# Patient Record
Sex: MALE | Race: WHITE | NOT HISPANIC OR LATINO | Employment: UNEMPLOYED | URBAN - METROPOLITAN AREA
[De-identification: names, ages, dates, MRNs, and addresses within clinical notes are randomized per-mention and may not be internally consistent; named-entity substitution may affect disease eponyms.]

---

## 2018-02-16 VITALS
HEART RATE: 55 BPM | TEMPERATURE: 98.8 F | OXYGEN SATURATION: 100 % | DIASTOLIC BLOOD PRESSURE: 65 MMHG | RESPIRATION RATE: 20 BRPM | WEIGHT: 130 LBS | SYSTOLIC BLOOD PRESSURE: 114 MMHG

## 2018-02-17 ENCOUNTER — HOSPITAL ENCOUNTER (EMERGENCY)
Facility: HOSPITAL | Age: 28
Discharge: HOME | End: 2018-02-17
Attending: EMERGENCY MEDICINE | Admitting: EMERGENCY MEDICINE
Payer: COMMERCIAL

## 2018-02-17 ENCOUNTER — APPOINTMENT (EMERGENCY)
Dept: RADIOLOGY | Facility: HOSPITAL | Age: 28
End: 2018-02-17
Payer: COMMERCIAL

## 2018-02-17 ENCOUNTER — APPOINTMENT (EMERGENCY)
Dept: CT IMAGING | Facility: HOSPITAL | Age: 28
End: 2018-02-17
Payer: COMMERCIAL

## 2018-02-17 DIAGNOSIS — S09.90XA MILD CLOSED HEAD INJURY, INITIAL ENCOUNTER: ICD-10-CM

## 2018-02-17 DIAGNOSIS — F32.A DEPRESSION: Primary | ICD-10-CM

## 2018-02-17 DIAGNOSIS — S02.2XXA CLOSED FRACTURE OF NASAL BONE, INITIAL ENCOUNTER: ICD-10-CM

## 2018-02-17 PROCEDURE — 70450 CT HEAD/BRAIN W/O DYE: CPT

## 2018-02-17 PROCEDURE — 73610 X-RAY EXAM OF ANKLE: CPT

## 2018-02-17 PROCEDURE — 99284 EMERGENCY DEPT VISIT MOD MDM: CPT

## 2018-02-17 PROCEDURE — 72125 CT NECK SPINE W/O DYE: CPT

## 2018-02-17 NOTE — DISCHARGE INSTRUCTIONS
Depression   WHAT YOU NEED TO KNOW:   Depression is a medical condition that causes feelings of sadness or hopelessness that do not go away  Depression may cause you to lose interest in things you used to enjoy  These feelings may interfere with your daily life  DISCHARGE INSTRUCTIONS:   Call 911 for any of the following:   · You think about harming yourself or someone else  Contact your healthcare provider if:   · Your symptoms do not improve  · You cannot make it to your next appointment  · You have new symptoms  · You have questions or concerns about your condition or care  Medicines:   · Antidepressants  may be given to improve or balance your mood  You may need to take this medicine for several weeks before you begin to feel better  · Take your medicine as directed  Contact your healthcare provider if you think your medicine is not helping or if you have side effects  Tell him of her if you are allergic to any medicine  Keep a list of the medicines, vitamins, and herbs you take  Include the amounts, and when and why you take them  Bring the list or the pill bottles to follow-up visits  Carry your medicine list with you in case of an emergency  Therapy  may be used to treat your depression  A therapist will help you learn to cope with your thoughts and feelings  This can be done alone or in a group  It may also be done with family members or a significant other  Self-care:   · Get regular physical activity  Try to exercise for 30 minutes, 3 to 5 days a week  Work with your healthcare provider to develop an exercise plan that you enjoy  Physical activity may improve your symptoms  · Get enough sleep  Create a routine to help you relax before bed  You can listen to music, read, or do yoga  Try to go to bed and wake up at the same time every day  Sleep is important for emotional health  · Eat a variety of healthy foods from all of the food groups    A healthy meal plan is low in fat, salt, and added sugar  Ask your healthcare provider for more information about a meal plan that is right for you  · Do not drink alcohol or use drugs  Alcohol and drugs can make your symptoms worse  Follow up with your healthcare provider as directed: Your healthcare provider will monitor your progress at follow-up visits  He or she will also monitor your medicine if you take antidepressants  Your healthcare provider will ask if the medicine is helping  Tell him or her about any side effects or problems you may have with your medicine  The type or amount of medicine may need to be changed  Write down your questions so you remember to ask them during your visits  © 2017 [employer]  Information is for End User's use only and may not be sold, redistributed or otherwise used for commercial purposes  All illustrations and images included in CareNotes® are the copyrighted property of [employer]  or [employer]  The above information is an  only  It is not intended as medical advice for individual conditions or treatments  Talk to your doctor, nurse or pharmacist before following any medical regimen to see if it is safe and effective for you  Facial Fracture   WHAT YOU NEED TO KNOW:   A facial fracture is a break in one or more of the bones in your face  The bones in your face include those around your eye, your cheekbones, and the bones of your nose and jaw  A facial fracture may also cause damage to nearby tissue  DISCHARGE INSTRUCTIONS:   Medicines:   · Decongestant medicine:  Decongestants help decrease swelling in your nose and sinuses  This medicine may also help you breathe easier  · Pain medicine: You may be given a prescription medicine to decrease pain  Do not wait until the pain is severe before you take this medicine  · Steroid medicine: This medicine helps decrease swelling in your face      · Antibiotic medicine:  Antibiotic medicine helps treat an infection caused by bacteria  This medicine may be given if you have an open wound  · Take your medicine as directed  Contact your healthcare provider if you think your medicine is not helping or if you have side effects  Tell him of her if you are allergic to any medicine  Keep a list of the medicines, vitamins, and herbs you take  Include the amounts, and when and why you take them  Bring the list or the pill bottles to follow-up visits  Carry your medicine list with you in case of an emergency  Follow up with your healthcare provider as directed:  Write down your questions so you remember to ask them during your visits  Nutrition:  You may not be able to eat solid food for a period of time  You may first be started on a liquid diet  Examples of liquids you may be able to have include, water, broth, gelatin, apple juice, or lemon-lime soda pop  After a few days, you may be allowed to eat soft foods, such as applesauce, bananas, cooked cereal, cottage cheese, pudding, and yogurt  Ask for more information about the type of foods you can eat  Rehabilitation:  If you had surgery to fix your facial fracture, you may need oral and facial rehabilitation  This is done to restore normal use and movement of your facial muscles  Ask for more information about rehabilitation  Help prevent a facial fracture:   · Wear a helmet when you ride a bicycle or a motorcycle  · Wear a seatbelt at all times when you are inside a motor vehicle  · Wear protective headgear and eyewear during sporting activities  Self-care:   · Apply ice:  Ice helps decrease swelling and pain  Ice may also help prevent tissue damage  Use an ice pack or put crushed ice in a plastic bag  Cover it with a towel and place it on your face for 15 to 20 minutes every hour as directed  · Keep your head elevated:  Keep you head above the level of your heart as often as you can  This will help decrease swelling and pain   Prop your head on pillows or blankets to keep it elevated comfortably  · Avoid putting pressure on your face:      ¨ Do not sleep on the injured side of your face  Pressure on the area of your injury may cause further damage  ¨ Sneeze with your mouth open to decrease pressure on your broken facial bones  Too much pressure from a sneeze may cause your broken bones to move and cause more damage  ¨ Try not to blow your nose because it may cause more damage if you have a fracture near your eye  The pressure from blowing your nose may pinch the nerve of your eye and cause permanent damage  · Clean your mouth carefully: It may be hard to clean your teeth if have an injury or fracture near your mouth  Your will be shown the best way to do this so you do not hurt yourself  A water pick or a child-sized soft toothbrush may work well to clean your mouth  Contact your healthcare provider if:   · You are bleeding from a wound on your face  · You have double vision or you suddenly have problems with your eyesight  · You have questions or concerns about your condition or care  Return to the emergency department if:   · You have clear or pinkish fluid draining from your nose or mouth  · You have numbness in your face  · You have worsening pain in your eye or face  · You suddenly have trouble chewing or swallowing  · You suddenly feel lightheaded and short of breath  · You have chest pain when you take a deep breath or cough  You may cough up blood  · Your arm or leg feels warm, tender, and painful  It may look swollen and red  © 2017 [employer] Information is for End User's use only and may not be sold, redistributed or otherwise used for commercial purposes  All illustrations and images included in CareNotes® are the copyrighted property of [employer] , [employer]  or [employer]  The above information is an  only   It is not intended as medical advice for individual conditions or treatments  Talk to your doctor, nurse or pharmacist before following any medical regimen to see if it is safe and effective for you  Head Injury   WHAT YOU NEED TO KNOW:   A head injury is most often caused by a blow to the head  This may occur from a fall, bicycle injury, sports injury, being struck in the head, or a motor vehicle accident  DISCHARGE INSTRUCTIONS:   Call 911 or have someone else call for any of the following:   · You cannot be woken  · You have a seizure  · You stop responding to others or you faint  · You have blurry or double vision  · Your speech becomes slurred or confused  · You have arm or leg weakness, loss of feeling, or new problems with coordination  · Your pupils are larger than usual or one pupil is a different size than the other  · You have blood or clear fluid coming out of your ears or nose  Return to the emergency department if:   · You have repeated or forceful vomiting  · You feel confused  · Your headache gets worse or becomes severe  · You or someone caring for you notices that you are harder to wake than usual   Contact your healthcare provider if:   · Your symptoms last longer than 6 weeks after the injury  · You have questions or concerns about your condition or care  Medicines:   · Acetaminophen  decreases pain  Acetaminophen is available without a doctor's order  Ask how much to take and how often to take it  Follow directions  Acetaminophen can cause liver damage if not taken correctly  · Take your medicine as directed  Contact your healthcare provider if you think your medicine is not helping or if you have side effects  Tell him or her if you are allergic to any medicine  Keep a list of the medicines, vitamins, and herbs you take  Include the amounts, and when and why you take them  Bring the list or the pill bottles to follow-up visits   Carry your medicine list with you in case of an emergency  Self-care:   · Rest  or do quiet activities for 24 to 48 hours  Limit your time watching TV, using the computer, or doing tasks that require a lot of thinking  Slowly return to your normal activities as directed  Do not play sports or do activities that may cause you to get hit in the head  Ask your healthcare provider when you can return to sports  · Apply ice  on your head for 15 to 20 minutes every hour or as directed  Use an ice pack, or put crushed ice in a plastic bag  Cover it with a towel before you apply it to your skin  Ice helps prevent tissue damage and decreases swelling and pain  · Have someone stay with you for 24 hours  or as directed  This person can monitor you for complications and call [phone]  When you are awake the person should ask you a few questions to see if you are thinking clearly  An example would be to ask your name or your address  Prevent another head injury:   · Wear a helmet that fits properly  Do this when you play sports, or ride a bike, scooter, or skateboard  Helmets help decrease your risk of a serious head injury  Talk to your healthcare provider about other ways you can protect yourself if you play sports  · Wear your seat belt every time you are in a car  This helps to decrease your risk for a head injury if you are in a car accident  Follow up with your healthcare provider as directed:  Write down your questions so you remember to ask them during your visits  © 2017 [employer] Information is for End User's use only and may not be sold, redistributed or otherwise used for commercial purposes  All illustrations and images included in CareNotes® are the copyrighted property of [employer]  or [employer]  The above information is an  only  It is not intended as medical advice for individual conditions or treatments   Talk to your doctor, nurse or pharmacist before following any medical regimen to see if it is safe and effective for you
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
## 2018-02-17 NOTE — ED PROVIDER NOTES
History  Chief Complaint   Patient presents with    Psychiatric Evaluation     patient denies SI/HI/AH/VH  patient just wants to get back on his xanax 2mg  "I just want to be normal "      58-year-old male with a history of schizoaffective disorder and anxiety presents to the emergency department for evaluation of crying and also head injury after falling off a ladder earlier today  Patient is accompanied by his mother  The patient states that his sister picks on me and his mother is concerned about all the crying that he does and he is also hearing voices  The patient is very upset because his sister told him that he is going to be involuntarily committed  Patient denies suicidal or homicidal ideations  He does seem intoxicated and slurring his words and states that he just took his last Xanax this evening  He fell about 5 feet from a ladder earlier today  He did strike his head but there was no loss of consciousness, however he has been complaining of a headache all day  No vomiting  He also complains of right ankle pain        History provided by:  Patient and parent   used: No    Psychiatric Evaluation   Presenting symptoms: depression and hallucinations (Hearing voices)    Presenting symptoms: no aggressive behavior, no homicidal ideas, no paranoid behavior, no self-mutilation, no suicidal thoughts, no suicidal threats and no suicide attempt    Presenting symptoms comment:  Crying  Patient accompanied by:  Parent  Context: medication    Context: not alcohol use, not drug abuse, not noncompliant and not recent medication change    Treatment compliance: All of the time ([facility])  Relieved by:   Anti-anxiety medications  Worsened by:  Family interactions  Associated symptoms: anxiety    Associated symptoms: no headaches    Risk factors: hx of mental illness    Risk factors: no hx of suicide attempts and no recent psychiatric admission        None       Past Medical History:   Diagnosis Date    Psychiatric disorder     Schizo-affective schizophrenia [facility])        History reviewed  No pertinent surgical history  History reviewed  No pertinent family history  I have reviewed and agree with the history as documented  Social History   Substance Use Topics    Smoking status: Current Some Day Smoker    Smokeless tobacco: Never Used    Alcohol use No        Review of Systems   Constitutional: Negative  HENT: Negative  Eyes: Negative  Respiratory: Negative  Cardiovascular: Negative  Gastrointestinal: Negative  Genitourinary: Negative  Musculoskeletal: Negative for neck pain  Skin: Negative  Allergic/Immunologic: Negative  Neurological: Negative  Negative for weakness, numbness and headaches  Hematological: Negative  Psychiatric/Behavioral: Positive for hallucinations (Hearing voices)  Negative for homicidal ideas, paranoia, self-injury and suicidal ideas  The patient is nervous/anxious  All other systems reviewed and are negative  Physical Exam  ED Triage Vitals [02/16/18 2314]   Temperature Pulse Respirations Blood Pressure SpO2   98 8 °F (37 1 °C) 55 20 114/65 100 %      Temp src Heart Rate Source Patient Position - Orthostatic VS BP Location FiO2 (%)   -- Monitor -- Right arm --      Pain Score       6           Orthostatic Vital Signs  Vitals:    02/16/18 2314   BP: 114/65   Pulse: 55       Physical Exam   Constitutional: He is oriented to person, place, and time  He appears well-developed and well-nourished  Non-toxic appearance  He does not have a sickly appearance  He does not appear ill  No distress (Tearful)  Patient appears somewhat intoxicated with slurred speech   HENT:   Head: Normocephalic  Head is with abrasion  Head is without raccoon's eyes, without Dumont's sign, without contusion and without laceration         Right Ear: Tympanic membrane and external ear normal    Left Ear: Tympanic membrane and external ear normal    Mouth/Throat: Oropharynx is clear and moist    Eyes: Conjunctivae and EOM are normal  Pupils are equal, round, and reactive to light  No scleral icterus  Neck: Normal range of motion  Neck supple  Spinous process tenderness present  Cardiovascular: Normal rate, regular rhythm and normal heart sounds  Pulmonary/Chest: Effort normal and breath sounds normal    Abdominal: Soft  Bowel sounds are normal  He exhibits no distension and no mass  There is no tenderness  No hernia  Musculoskeletal: Normal range of motion  He exhibits no edema or deformity  Right ankle: Tenderness  Lateral malleolus tenderness found  Feet:    Lymphadenopathy:     He has no cervical adenopathy  Neurological: He is alert and oriented to person, place, and time  He has normal strength and normal reflexes  He displays normal reflexes  No cranial nerve deficit  He exhibits normal muscle tone  Coordination normal    Skin: Skin is warm and dry  No rash noted  He is not diaphoretic  No erythema  No pallor  Psychiatric: Thought content normal  His speech is slurred  He is slowed  He is not actively hallucinating  He exhibits a depressed mood  He is inattentive  Nursing note and vitals reviewed  ED Medications  Medications - No data to display    Diagnostic Studies  Results Reviewed     None                 XR ankle 3+ views RIGHT   ED Interpretation by Kierra Lr DO (02/17 0132)   No fracture      Final Result by Mega Sanchez MD (02/17 0648)      No acute osseous abnormality  Workstation performed: [id number]         CT cervical spine without contrast   ED Interpretation by Roma Prater DO (02/17 0218)   FINDINGS:   Brain: No significant acute abnormality identified  No acute hemorrhage seen within the brain  No   acute extra-axial fluid collections visualized  No evidence of significant mass effect within the brain  Ventricles: No evidence of significant hydrocephalus     Bones/joints: Right nasal bone fracture, which is suspected to be acute  There is mild overlying soft   tissue swelling  Recommend clinical correlation  No additional acute fractures seen  Soft tissues: Soft tissue swelling in the right posterior scalp  Sinuses: Visualized paranasal sinuses appear clear  Mastoid air cells: Mastoid air cells appear clear  IMPRESSION:   1  No evidence of acute intracranial injury  2  Right nasal bone fracture  3  See above for remaining findings  _______________________________________________   Lincoln Ehsan:   CT Cervical Spine Without Intravenous Contrast   EXAM DATE/TIME:   Cyril Lopez Accession: [id number] MRN: [id number]  Preliminary Radiology Report    (QA) DISCREPANCY? If there is a discrepancy between the preliminary and final interpretation, please notify [facility] via [email or web address]  "[email or web address]  com  If you do not have access to our QA portal, call our QA team at [phone]   This report is intended only for the use of the referring physician, and only in accordance with law, If you received this in error, call [phone]   Page 2 of 2   2/17/2018 1:10 AM   CLINICAL HISTORY:   33years old, male; Injury or trauma; Fall; Initial encounter; Concussion / head injury; Consciousness   not specified; Concussion /head injury; Injury date: 2/16/18; Injury details: Fall off ladder, struck   posterior head   TECHNIQUE:   Axial computed tomography images of the cervical spine without intravenous contrast    Coronal reformatted images were created and reviewed  COMPARISON:   No relevant prior studies available  FINDINGS:   Vertebrae: No acute cervical spine fractures visualized  No evidence of significant vertebral   subluxation  No evidence of acute facet dislocation  Discs/spinal canal/neural foramina: Mild degenerative disc disease at C5-6  Soft tissues: No acute abnormality of the visualized soft tissues is seen     Lung apices: No pneumothorax seen    IMPRESSION:   1  No acute cervical spine fractures identified  2  See above for remaining findings  Final Result by Xavier Spence DO (02/17 0715)   Degenerative and congenital changes  No cervical spine fracture or traumatic malalignment  Findings are consistent with the preliminary report from [facility] which was provided shortly after completion of the exam                 Workstation performed: [id number]         CT head without contrast   ED Interpretation by Case Valentine DO (02/17 0218)   FINDINGS:   Brain: No significant acute abnormality identified  No acute hemorrhage seen within the brain  No   acute extra-axial fluid collections visualized  No evidence of significant mass effect within the brain  Ventricles: No evidence of significant hydrocephalus  Bones/joints: Right nasal bone fracture, which is suspected to be acute  There is mild overlying soft   tissue swelling  Recommend clinical correlation  No additional acute fractures seen  Soft tissues: Soft tissue swelling in the right posterior scalp  Sinuses: Visualized paranasal sinuses appear clear  Mastoid air cells: Mastoid air cells appear clear  IMPRESSION:   1  No evidence of acute intracranial injury  2  Right nasal bone fracture  3  See above for remaining findings  _______________________________________________   Marisela Hollins:   CT Cervical Spine Without Intravenous Contrast   EXAM DATE/TIME:   Yannick Lagos Accession: [id number] MRN: [id number]  Preliminary Radiology Report    (QA) DISCREPANCY? If there is a discrepancy between the preliminary and final interpretation, please notify [facility] via [email or web address]  [email or web address]  com     If you do not have access to our QA portal, call our QA team at [phone]   This report is intended only for the use of the referring physician, and only in accordance with law, If you received this in error, call [phone]   Page 2 of 2   2/17/2018 1:10 AM   CLINICAL HISTORY:   33years old, male; Injury or trauma; Fall; Initial encounter; Concussion / head injury; Consciousness   not specified; Concussion /head injury; Injury date: 2/16/18; Injury details: Fall off ladder, struck   posterior head   TECHNIQUE:   Axial computed tomography images of the cervical spine without intravenous contrast    Coronal reformatted images were created and reviewed  COMPARISON:   No relevant prior studies available  FINDINGS:   Vertebrae: No acute cervical spine fractures visualized  No evidence of significant vertebral   subluxation  No evidence of acute facet dislocation  Discs/spinal canal/neural foramina: Mild degenerative disc disease at C5-6  Soft tissues: No acute abnormality of the visualized soft tissues is seen  Lung apices: No pneumothorax seen  IMPRESSION:   1  No acute cervical spine fractures identified  2  See above for remaining findings  Final Result by Wm Jones DO (02/17 0710)   No intracranial hemorrhage or calvarial fracture  Findings are consistent with the preliminary report from [facility] which was provided shortly after completion of the exam             Workstation performed: [id number]                    Procedures  Procedures       Phone Contacts  ED Phone Contact    ED Course  ED Course                                MDM  Number of Diagnoses or Management Options  Diagnosis management comments: 64-year-old male presents for psychiatric evaluation and also to be evaluated after he fell off a ladder earlier today  He is not suicidal or homicidal but is crying because his sister picks on him and told him that he is going to be involuntarily committed  He does appear somewhat intoxicated but admits to taking Xanax before coming here  He does have an abrasion to the top of his head and tenderness along his C-spine and right ankle    At this time I do not feel patient needs further psychiatric evaluation, but will evaluate his injuries given his inability to provide accurate history regarding his fall       Amount and/or Complexity of Data Reviewed  Tests in the radiology section of CPT®: ordered and reviewed      CritCare Time    Disposition  Final diagnoses:   Depression   Mild closed head injury, initial encounter   Closed fracture of nasal bone, initial encounter     Time reflects when diagnosis was documented in both MDM as applicable and the Disposition within this note     Time User Action Codes Description Comment    2/17/2018  2:19 AM Madelin Bond Add [F32 9] Depression     2/17/2018  2:19 AM Smeriglio, Richard Boers Add [S09 90XA] Mild closed head injury, initial encounter     2/17/2018  2:19 AM Smeriglio, Richard Boers Add [S02  2XXA] Closed fracture of nasal bone, initial encounter       ED Disposition     ED Disposition Condition Comment    Discharge  Rachael Godfrey discharge to home/self care  Condition at discharge: Good        Follow-up Information     Follow up With Specialties Details Why [id number] [facility] Call today To schedule an appointment as soon as you can [address] [facility] [address]  [address] [facility],  Otolaryngology Call in 1 week If symptoms persist [address]          There are no discharge medications for this patient  No discharge procedures on file      ED Provider  Electronically Signed by           Michoacano Ríos DO  02/17/18 2780

## 2018-08-11 ENCOUNTER — HOSPITAL ENCOUNTER (EMERGENCY)
Facility: HOSPITAL | Age: 28
End: 2018-08-12
Attending: EMERGENCY MEDICINE
Payer: COMMERCIAL

## 2018-08-11 DIAGNOSIS — F29 PSYCHOSIS (HCC): Primary | ICD-10-CM

## 2018-08-11 LAB
BILIRUB UR QL STRIP: NEGATIVE
CLARITY UR: ABNORMAL
COLOR UR: ABNORMAL
GLUCOSE UR STRIP-MCNC: NEGATIVE MG/DL
HGB UR QL STRIP.AUTO: NEGATIVE
KETONES UR STRIP-MCNC: NEGATIVE MG/DL
LEUKOCYTE ESTERASE UR QL STRIP: NEGATIVE
NITRITE UR QL STRIP: NEGATIVE
PH UR STRIP.AUTO: 7 [PH] (ref 4.5–8)
PROT UR STRIP-MCNC: ABNORMAL MG/DL
SP GR UR STRIP.AUTO: 1.02 (ref 1–1.03)
UROBILINOGEN UR QL STRIP.AUTO: 1 E.U./DL

## 2018-08-11 PROCEDURE — 81001 URINALYSIS AUTO W/SCOPE: CPT

## 2018-08-11 PROCEDURE — 80307 DRUG TEST PRSMV CHEM ANLYZR: CPT | Performed by: EMERGENCY MEDICINE

## 2018-08-11 RX ORDER — DIVALPROEX SODIUM 500 MG/1
TABLET, DELAYED RELEASE ORAL EVERY 8 HOURS SCHEDULED
COMMUNITY

## 2018-08-11 RX ORDER — ALPRAZOLAM 2 MG/1
TABLET ORAL 2 TIMES DAILY PRN
COMMUNITY
End: 2019-02-14

## 2018-08-11 NOTE — LETTER
[address]   [place] [address]  Dept: [facility] [address] CONSENT    NAME Mica NICHOLS 1990                              MRN [id number]    I have been informed of my rights regarding examination, treatment, and transfer   by Dr Aleah Huerta MD    Benefits: Specialized equipment and/or services available at the receiving facility (Include comment)________________________ (Inpatient psychiatric care)    Risks: Potential for delay in receiving treatment, Potential deterioration of medical condition, Increased discomfort during transfer, Possible worsening of condition or death during transfer      Transfer Request   I acknowledge that my medical condition has been evaluated and explained to me by the emergency department physician or other qualified medical person and/or my attending physician who has recommended and offered to me further medical examination and treatment  I understand the Hospital's obligation with respect to the treatment and stabilization of my emergency medical condition  I nevertheless request to be transferred  I release the Hospital, the doctor, and any other persons caring for me from all responsibility or liability for any injury or ill effects that may result from my transfer and agree to accept all responsibility for the consequences of my choice to transfer, rather than receive stabilizing treatment at the Hospital  I understand that because the transfer is my request, my insurance may not provide reimbursement for the services  The Hospital will assist and direct me and my family in how to make arrangements for transfer, but the hospital is not liable for any fees charged by the transport service    In spite of this understanding, I refuse to consent to further medical examination and treatment which has been offered to me, and request transfer to Accepting Facility Name, [address] [facility]  I authorize the performance of emergency medical procedures and treatments upon me in both transit and upon arrival at the receiving facility  Additionally, I authorize the release of any and all medical records to the receiving facility and request they be transported with me, if possible  I authorize the performance of emergency medical procedures and treatments upon me in both transit and upon arrival at the receiving facility  Additionally, I authorize the release of any and all medical records to the receiving facility and request they be transported with me, if possible  I understand that the safest mode of transportation during a medical emergency is an ambulance and that the Hospital advocates the use of this mode of transport  Risks of traveling to the receiving facility by car, including absence of medical control, life sustaining equipment, such as oxygen, and medical personnel has been explained to me and I fully understand them  (BRIAN CORRECT BOX BELOW)  [  ]  I consent to the stated transfer and to be transported by ambulance/helicopter  [  ]  I consent to the stated transfer, but refuse transportation by ambulance and accept full responsibility for my transportation by car  I understand the risks of non-ambulance transfers and I exonerate the Hospital and its staff from any deterioration in my condition that results from this refusal     X___________________________________________    DATE  18  TIME________  Signature of patient or legally responsible individual signing on patient behalf           RELATIONSHIP TO PATIENT_________________________          Provider Certification    NAME Briana Broussard                                         1990                              MRN [id number]    A medical screening exam was performed on the above named patient    Based on the examination:    Condition Necessitating Transfer The encounter diagnosis was Psychosis  Patient Condition: The patient has been stabilized such that within reasonable medical probability, no material deterioration of the patient condition or the condition of the unborn child(marika) is likely to result from the transfer    Reason for Transfer: No bed available at level of patient's needs    Transfer Requirements: [address] [facility]   · Space available and qualified personnel available for treatment as acknowledged by    · Agreed to accept transfer and to provide appropriate medical treatment as acknowledged by       Dr Chelle Garcia  · Appropriate medical records of the examination and treatment of the patient are provided at the time of transfer   [address] _______  · Transfer will be performed by qualified personnel from    and appropriate transfer equipment as required, including the use of necessary and appropriate life support measures      Provider Certification: I have examined the patient and explained the following risks and benefits of being transferred/refusing transfer to the patient/family:  Consent was not obtained as patient is committed to psychiatric facility and transfer is mandated, General risk, such as traffic hazards, adverse weather conditions, rough terrain or turbulence, possible failure of equipment (including vehicle or aircraft), or consequences of actions of persons outside the control of the transport personnel, Unanticipated needs of medical equipment and personnel during transport, Risk of worsening condition, The possibility of a transport vehicle being unavailable, The patient is stable for psychiatric transfer because they are medically stable, and is protected from harming him/herself or others during transport      Based on these reasonable risks and benefits to the patient and/or the unborn child(marika), and based upon the information available at the time of the patients examination, I certify that the medical benefits reasonably to be expected from the provision of appropriate medical treatments at another medical facility outweigh the increasing risks, if any, to the individuals medical condition, and in the case of labor to the unborn child, from effecting the transfer      X____________________________________________ DATE 08/12/18        TIME_______      ORIGINAL - SEND TO MEDICAL RECORDS   COPY - SEND WITH PATIENT DURING TRANSFER

## 2018-08-11 NOTE — LETTER
[address]  [address] [place] [address]  Dept: [facility] [place] [facility] CONSENT    NAME Alexsandra Zamora                                         1990                              MRN [id number]    I have been informed of my rights regarding examination, treatment, and transfer   by Dr Alejandro Sheffield MD    Benefits: Specialized equipment and/or services available at the receiving facility (Include comment)________________________ (Inpatient psychiatric care)    Risks: Potential for delay in receiving treatment, Potential deterioration of medical condition, Increased discomfort during transfer, Possible worsening of condition or death during transfer      Transfer Request   I acknowledge that my medical condition has been evaluated and explained to me by the emergency department physician or other qualified medical person and/or my attending physician who has recommended and offered to me further medical examination and treatment  I understand the Hospital's obligation with respect to the treatment and stabilization of my emergency medical condition  I nevertheless request to be transferred  I release the Hospital, the doctor, and any other persons caring for me from all responsibility or liability for any injury or ill effects that may result from my transfer and agree to accept all responsibility for the consequences of my choice to transfer, rather than receive stabilizing treatment at the Hospital  I understand that because the transfer is my request, my insurance may not provide reimbursement for the services  The Hospital will assist and direct me and my family in how to make arrangements for transfer, but the hospital is not liable for any fees charged by the transport service    In spite of this understanding, I refuse to consent to further medical examination and treatment which has been offered to me, and request transfer to Accepting Facility Name, [facility] : [place]  I authorize the performance of emergency medical procedures and treatments upon me in both transit and upon arrival at the receiving facility  Additionally, I authorize the release of any and all medical records to the receiving facility and request they be transported with me, if possible  I authorize the performance of emergency medical procedures and treatments upon me in both transit and upon arrival at the receiving facility  Additionally, I authorize the release of any and all medical records to the receiving facility and request they be transported with me, if possible  I understand that the safest mode of transportation during a medical emergency is an ambulance and that the Hospital advocates the use of this mode of transport  Risks of traveling to the receiving facility by car, including absence of medical control, life sustaining equipment, such as oxygen, and medical personnel has been explained to me and I fully understand them  (BRIAN CORRECT BOX BELOW)  Carlos Ebbing  ]  I consent to the stated transfer and to be transported by ambulance/helicopter  [  ]  I consent to the stated transfer, but refuse transportation by ambulance and accept full responsibility for my transportation by car  I understand the risks of non-ambulance transfers and I exonerate the Hospital and its staff from any deterioration in my condition that results from this refusal     X___________________________________________    DATE  18  TIME___174_____  Signature of patient or legally responsible individual signing on patient behalf           RELATIONSHIP TO PATIENT________self_________________          Provider Certification    NAME Dina Beckham                                         1990                              MRN [id number]    A medical screening exam was performed on the above named patient    Based on the examination:    Condition Necessitating Transfer The encounter diagnosis was Psychosis  Patient Condition: The patient has been stabilized such that within reasonable medical probability, no material deterioration of the patient condition or the condition of the unborn child(marika) is likely to result from the transfer    Reason for Transfer: No bed available at level of patient's needs    Transfer Requirements: [facility],[address]   · Space available and qualified personnel available for treatment as acknowledged by    · Agreed to accept transfer and to provide appropriate medical treatment as acknowledged by       Dr Capri Herr  · Appropriate medical records of the examination and treatment of the patient are provided at the time of transfer   [facility], [address] _______  · Transfer will be performed by qualified personnel from    and appropriate transfer equipment as required, including the use of necessary and appropriate life support measures      Provider Certification: I have examined the patient and explained the following risks and benefits of being transferred/refusing transfer to the patient/family:  Consent was not obtained as patient is committed to psychiatric facility and transfer is mandated, General risk, such as traffic hazards, adverse weather conditions, rough terrain or turbulence, possible failure of equipment (including vehicle or aircraft), or consequences of actions of persons outside the control of the transport personnel, Unanticipated needs of medical equipment and personnel during transport, Risk of worsening condition, The possibility of a transport vehicle being unavailable, The patient is stable for psychiatric transfer because they are medically stable, and is protected from harming him/herself or others during transport      Based on these reasonable risks and benefits to the patient and/or the unborn child(marika), and based upon the information available at the time of the patients examination, I certify that the medical benefits reasonably to be expected from the provision of appropriate medical treatments at another medical facility outweigh the increasing risks, if any, to the individuals medical condition, and in the case of labor to the unborn child, from effecting the transfer      X____________________________________________ DATE 08/12/18        TIME_______      ORIGINAL - SEND TO MEDICAL RECORDS   COPY - SEND WITH PATIENT DURING TRANSFER

## 2018-08-12 VITALS
DIASTOLIC BLOOD PRESSURE: 69 MMHG | TEMPERATURE: 98.1 F | BODY MASS INDEX: 20.89 KG/M2 | WEIGHT: 130 LBS | HEART RATE: 99 BPM | OXYGEN SATURATION: 96 % | RESPIRATION RATE: 22 BRPM | SYSTOLIC BLOOD PRESSURE: 120 MMHG | HEIGHT: 66 IN

## 2018-08-12 LAB
AMPHETAMINES SERPL QL SCN: POSITIVE
BACTERIA UR QL AUTO: ABNORMAL /HPF
BARBITURATES UR QL: NEGATIVE
BENZODIAZ UR QL: POSITIVE
COCAINE UR QL: NEGATIVE
ETHANOL EXG-MCNC: 0 MG/DL
HYALINE CASTS #/AREA URNS LPF: ABNORMAL /LPF
METHADONE UR QL: NEGATIVE
NON-SQ EPI CELLS URNS QL MICRO: ABNORMAL /HPF
OPIATES UR QL SCN: POSITIVE
PCP UR QL: NEGATIVE
RBC #/AREA URNS AUTO: ABNORMAL /HPF
THC UR QL: NEGATIVE
WBC #/AREA URNS AUTO: ABNORMAL /HPF

## 2018-08-12 PROCEDURE — 99285 EMERGENCY DEPT VISIT HI MDM: CPT

## 2018-08-12 PROCEDURE — 82075 ASSAY OF BREATH ETHANOL: CPT | Performed by: EMERGENCY MEDICINE

## 2018-08-12 RX ORDER — HALOPERIDOL 5 MG/ML
5 INJECTION INTRAMUSCULAR ONCE
Status: COMPLETED | OUTPATIENT
Start: 2018-08-12 | End: 2018-08-12

## 2018-08-12 RX ORDER — OLANZAPINE 10 MG/1
10 TABLET ORAL ONCE
Status: COMPLETED | OUTPATIENT
Start: 2018-08-12 | End: 2018-08-12

## 2018-08-12 RX ORDER — OLANZAPINE 10 MG/1
10 TABLET, ORALLY DISINTEGRATING ORAL ONCE
Status: COMPLETED | OUTPATIENT
Start: 2018-08-12 | End: 2018-08-12

## 2018-08-12 RX ORDER — MIDAZOLAM HYDROCHLORIDE 1 MG/ML
1 INJECTION INTRAMUSCULAR; INTRAVENOUS ONCE
Status: COMPLETED | OUTPATIENT
Start: 2018-08-12 | End: 2018-08-12

## 2018-08-12 RX ORDER — NICOTINE 21 MG/24HR
21 PATCH, TRANSDERMAL 24 HOURS TRANSDERMAL DAILY
Status: DISCONTINUED | OUTPATIENT
Start: 2018-08-12 | End: 2018-08-13 | Stop reason: HOSPADM

## 2018-08-12 RX ORDER — LORAZEPAM 2 MG/ML
2 INJECTION INTRAMUSCULAR ONCE
Status: DISCONTINUED | OUTPATIENT
Start: 2018-08-12 | End: 2018-08-12

## 2018-08-12 RX ORDER — LORAZEPAM 0.5 MG/1
1 TABLET ORAL ONCE
Status: COMPLETED | OUTPATIENT
Start: 2018-08-12 | End: 2018-08-12

## 2018-08-12 RX ORDER — LORAZEPAM 0.5 MG/1
2 TABLET ORAL ONCE
Status: COMPLETED | OUTPATIENT
Start: 2018-08-12 | End: 2018-08-12

## 2018-08-12 RX ADMIN — HALOPERIDOL LACTATE 5 MG: 5 INJECTION, SOLUTION INTRAMUSCULAR at 20:05

## 2018-08-12 RX ADMIN — LORAZEPAM 1 MG: 0.5 TABLET ORAL at 23:26

## 2018-08-12 RX ADMIN — LIDOCAINE HYDROCHLORIDE 15 ML: 20 SOLUTION ORAL; TOPICAL at 05:55

## 2018-08-12 RX ADMIN — OLANZAPINE 10 MG: 10 TABLET, FILM COATED ORAL at 03:39

## 2018-08-12 RX ADMIN — LORAZEPAM 2 MG: 0.5 TABLET ORAL at 05:33

## 2018-08-12 RX ADMIN — MIDAZOLAM 1 MG: 1 INJECTION INTRAMUSCULAR; INTRAVENOUS at 20:05

## 2018-08-12 RX ADMIN — NICOTINE 21 MG: 21 PATCH, EXTENDED RELEASE TRANSDERMAL at 05:55

## 2018-08-12 RX ADMIN — OLANZAPINE 10 MG: 10 TABLET, ORALLY DISINTEGRATING ORAL at 14:10

## 2018-08-12 NOTE — ED NOTES
Pt requesting to call his mother and something to clean the floor with "I want to do some push ups, but the floor is dirty"   Provided the pt with a small disposable cloth     Heather Burris RN  08/12/18 1814
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
## 2018-08-12 NOTE — ED NOTES
CW received a phone call from Diane at [facility] that patient is accepted by Dr Lulu So  CW set up transport with [facility] for 2330  CW told patient regarding admittance to [facility] and transport time

## 2018-08-12 NOTE — ED NOTES
Pt keeps asking and stating "Which wayout I need to see my mom im sorry im gonna try "     [place] Pillar  08/12/18 2823

## 2018-08-12 NOTE — ED NOTES
Korina from [facility] called and stated that bed search has come up empty but that [facility] has one bed and is reviewing and [place] [facility] may have beds later as there system and fax is down and a repairman has been called  Crisis Worker ([address]) called Tg Rosales at [facility] and she stated she is currently reviewing 3 patients for the one open bed and said will be filled with one of them

## 2018-08-12 NOTE — ED NOTES
Pt moved from 13 to room 7 with 1:1 placed at bedside pt was wandering in thakkar, going into paitent rooms, and routing through cabinets in his room throwing out hospital supplies       Romy Medeiros RN  08/12/18 0154

## 2018-08-12 NOTE — ED NOTES
Pt found examining computer monitor cables and inquiring about their use  Pt educated and redirected       Alice Almanzar RN  08/12/18 7569
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
## 2018-08-12 NOTE — ED NOTES
CW called and spoke to admission staff at [facility] who stated they are still reviewing patient

## 2018-08-12 NOTE — ED NOTES
Crisis Worker (CW) called and spoke to Zachary at [facility] who stated that she has a dual male bed available  CW faxed 12 and chart to [facility] for review

## 2018-08-12 NOTE — ED NOTES
Notified that the pt had eloped  Contacted Crisis to keep them aware  [address] and provided a description of the pt        Margarita Mckeon RN  08/12/18 9821
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
## 2018-08-12 NOTE — ED NOTES
Patient crawling on floor and trying to take stretcher apart  Patient redirected to bed         Phyllis Murrell RN  08/12/18 4397

## 2018-08-12 NOTE — ED NOTES
Pt provided with short call bell  Music channel being utilized  Pt calm and cooperative       Polly Torres, DENISHA  08/12/18 7215
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
## 2018-08-12 NOTE — ED NOTES
Patient stated he gets paranoid when he sees police  Patient wants to leave and come back  Patient has to be redirected to stay in room       Sandi Huston  08/12/18 9907

## 2018-08-12 NOTE — ED NOTES
PT ambulated to bathroom at this time and grabbed a drink as well       [place] Broody  08/12/18 9422
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
## 2018-08-12 NOTE — ED NOTES
PT stated "i need to call my mom real fucking bad, I'll give it 15 more minutes and I'm leaving"     Ginger Campuzano  08/12/18 8560

## 2018-08-12 NOTE — ED NOTES
Pt called me to room and requested me to have his urine tested for beastiality  Pt informed that beastiality was not something that could be tested for  Pt very insistant that his urine be tested, I then explained to him what beastiality was         Steffen Zabala RN  08/12/18 0558

## 2018-08-12 NOTE — ED NOTES
Spoke with hospital supervisor Jessie bryant to provide copy of patients face sheet to [facility] PD officers Idalia Diaz and Sophia Ibarra RN  08/12/18 2740

## 2018-08-12 NOTE — ED NOTES
Patient states he wants to get help but not for his mental health  Patient states he can control that  He stated he wants to get help for his private area       Adria Price  08/12/18 6251

## 2018-08-12 NOTE — ED NOTES
PT sitting on floor and decided to follow visitors leaving and eloped through waiting room to escape up the street             [employer]  08/12/18 9204

## 2018-08-12 NOTE — EMTALA/ACUTE CARE TRANSFER
[address]  [address] [place] [address]  Dept: [facility] [place] 227 CONSENT    NAME Kelly Monroy                                         1990                              MRN [id number]    I have been informed of my rights regarding examination, treatment, and transfer   by Dr Jori Akhtar MD    Benefits: Specialized equipment and/or services available at the receiving facility (Include comment)________________________ (Inpatient psychiatric care)    Risks: Potential for delay in receiving treatment, Potential deterioration of medical condition, Increased discomfort during transfer, Possible worsening of condition or death during transfer      Transfer Request   I acknowledge that my medical condition has been evaluated and explained to me by the emergency department physician or other qualified medical person and/or my attending physician who has recommended and offered to me further medical examination and treatment  I understand the Hospital's obligation with respect to the treatment and stabilization of my emergency medical condition  I nevertheless request to be transferred  I release the Hospital, the doctor, and any other persons caring for me from all responsibility or liability for any injury or ill effects that may result from my transfer and agree to accept all responsibility for the consequences of my choice to transfer, rather than receive stabilizing treatment at the Hospital  I understand that because the transfer is my request, my insurance may not provide reimbursement for the services  The Hospital will assist and direct me and my family in how to make arrangements for transfer, but the hospital is not liable for any fees charged by the transport service    In spite of this understanding, I refuse to consent to further medical examination and treatment which has been offered to me, and request transfer to Accepting Facility Name, [facility] 41 : Jann Quintero  I authorize the performance of emergency medical procedures and treatments upon me in both transit and upon arrival at the receiving facility  Additionally, I authorize the release of any and all medical records to the receiving facility and request they be transported with me, if possible  I authorize the performance of emergency medical procedures and treatments upon me in both transit and upon arrival at the receiving facility  Additionally, I authorize the release of any and all medical records to the receiving facility and request they be transported with me, if possible  I understand that the safest mode of transportation during a medical emergency is an ambulance and that the Hospital advocates the use of this mode of transport  Risks of traveling to the receiving facility by car, including absence of medical control, life sustaining equipment, such as oxygen, and medical personnel has been explained to me and I fully understand them  (BRIAN CORRECT BOX BELOW)  [  ]  I consent to the stated transfer and to be transported by ambulance/helicopter  [  ]  I consent to the stated transfer, but refuse transportation by ambulance and accept full responsibility for my transportation by car  I understand the risks of non-ambulance transfers and I exonerate the Hospital and its staff from any deterioration in my condition that results from this refusal     X___________________________________________    DATE  18  TIME________  Signature of patient or legally responsible individual signing on patient behalf           RELATIONSHIP TO PATIENT_________________________          Provider Certification    NAME Sasha Goodwin                                        [facility] 1990                              MRN [id number]    A medical screening exam was performed on the above named patient    Based on the examination:    Condition Necessitating Transfer There were no encounter diagnoses  Patient Condition: The patient has been stabilized such that within reasonable medical probability, no material deterioration of the patient condition or the condition of the unborn child(marika) is likely to result from the transfer    Reason for Transfer: No bed available at level of patient's needs    Transfer Requirements: [facility]   · Space available and qualified personnel available for treatment as acknowledged by    · Agreed to accept transfer and to provide appropriate medical treatment as acknowledged by       Dr Mindy Medrano  · Appropriate medical records of the examination and treatment of the patient are provided at the time of transfer   [facility] _______  · Transfer will be performed by qualified personnel from    and appropriate transfer equipment as required, including the use of necessary and appropriate life support measures      Provider Certification: I have examined the patient and explained the following risks and benefits of being transferred/refusing transfer to the patient/family:  Consent was not obtained as patient is committed to psychiatric facility and transfer is mandated, General risk, such as traffic hazards, adverse weather conditions, rough terrain or turbulence, possible failure of equipment (including vehicle or aircraft), or consequences of actions of persons outside the control of the transport personnel, Unanticipated needs of medical equipment and personnel during transport, Risk of worsening condition, The possibility of a transport vehicle being unavailable, The patient is stable for psychiatric transfer because they are medically stable, and is protected from harming him/herself or others during transport      Based on these reasonable risks and benefits to the patient and/or the unborn child(marika), and based upon the information available at the time of the patients examination, I certify that the medical benefits reasonably to be expected from the provision of appropriate medical treatments at another medical facility outweigh the increasing risks, if any, to the individuals medical condition, and in the case of labor to the unborn child, from effecting the transfer      X____________________________________________ DATE 08/12/18        TIME_______      ORIGINAL - SEND TO MEDICAL RECORDS   COPY - SEND WITH PATIENT DURING TRANSFER

## 2018-08-12 NOTE — ED NOTES
Crisis worker placed calls to the following facilities; Currently there are no beds within [address], [place], [facility], [place], [place], [facility], [facility], [facility], [facility], or [facility]  Bed search will resume next shift  Rusty Robb is also working on bed search; [employer] has been exhausted for the overnight shift, will resume in AM shift

## 2018-08-12 NOTE — ED NOTES
PT states "I want to die but I'm not going to kill myself "      Zamzam Anderson  08/12/18 7364 [facility]  08/12/18 3682

## 2018-08-12 NOTE — ED ATTENDING ATTESTATION
Amna Gomez MD, saw and evaluated the patient  I have discussed the patient with the resident/non-physician practitioner and agree with the resident's/non-physician practitioner's findings, Plan of Care, and MDM as documented in the resident's/non-physician practitioner's note, except where noted  All available labs and Radiology studies were reviewed  At this point I agree with the current assessment done in the Emergency Department  I have conducted an independent evaluation of this patient a history and physical is as follows:    Patient with schizoaffective disease, presenting to the emergency department with generalized decompensation  Patient believes that the TV is trying to control him  He states that he has "BC allergy" coming out of his penis  The patient states that he does not want to talk in the room because he is concerned that the TV might be controlling him  The patient is trying to hide the curtain  The patient is here with his mother  The patient is not suicidal or homicidal   He states he is willing to sign himself in  The patient has no somatic complaints  On exam he is restless, anxious, and pacing  Vital signs were reviewed  Patient is awake, alert, interactive  The patient's pupils are equally round reactive to light  Oropharynx is clear with moist mucous membranes  Neck is supple and nontender with no adenopathy or JVD  Heart is regular with no murmurs, rubs, or gallops  Lungs are clear and equal with no wheezes, rales, or rhonchi  Abdomen is soft and nontender with no masses, rebound, or guarding  There is no CVA tenderness  The patient was completely exposed  There is no skin breakdown  There are no rashes or skin changes  Extremities are warm and well perfused with good pulses  The patient has normal strength, sensation, and cranial nerves  Impression:  Psychosis  Will plan to 201  If patient declines 201, will 302      Critical Care Time  [facility] Time    Procedures

## 2018-08-12 NOTE — ED NOTES
Patient speaking incoherent at times, walking around the room with his hands in his pants       Yamilet Ivan  08/12/18 9215

## 2018-08-12 NOTE — ED NOTES
Insurance Authorization:   Phone call placed to Eduin Miguel to [employer]    3 days approved  Level of care:  Inpatient Mental Health  Review on Wednesday     Authorization # Accepting facility to call for number

## 2018-08-13 NOTE — ED NOTES
Pt's sister in the waiting room demanding to speak and see her brother        Jitendra Araiza RN  08/12/18 0974

## 2018-08-13 NOTE — ED CARE HANDOFF
Emergency Department Sign Out Note        Sign out and transfer of care from Dr Hayden and Dr Rowena Connelly  See Separate Emergency Department note  The patient, Valeria Cornejo, was evaluated by the previous provider for  psychosis  patient continued to be agitated emergency department patient was lobe in risk, patient agitated making threats to emergency personnel  Patient given Haldol and Versed  Patient rested after this  Patient continued  To rest with stable vital signs  Patient tolerated p o  Without difficulty  Patient began to have become agitated before transportation  1 mg of p o  Ativan was given  Patient  Anxiety remitted  Patient transfer to facility with stable psychiatric condition with controlled anxiety  ED Course as of Aug 13 0053   Claire Jovels Aug 12, 2018   1711 Sign out of sign, psycotic-->meth use--> penis things-->eagle dion- zyprexa-> eloped, bed assigned 201 signed, emtala filed out    1821  Patient is threatening her self and others at this time, will provide  Haldol and Versed for treatment of psychosis and anxiety      Procedures  MDM  CritCare Time      Disposition  Final diagnoses:   Psychosis     Time reflects when diagnosis was documented in both MDM as applicable and the Disposition within this note     Time User Action Codes Description Comment    8/12/2018  5:15 PM Jie Peñaloza Add [F29] Psychosis       ED Disposition     ED Disposition Condition Comment    Transfer to Another [address] [place] [address] Way should be transferred out to [place] under the care of Dr Isela Marcano MD Documentation      Most Recent Value   Patient Condition  The patient has been stabilized such that within reasonable medical probability, no material deterioration of the patient condition or the condition of the unborn child(marika) is likely to result from the transfer   Reason for Transfer  No bed available at level of patient's needs Benefits of Transfer  Specialized equipment and/or services available at the receiving facility (Include comment)________________________ [Inpatient psychiatric care]   Risks of Transfer  Potential for delay in receiving treatment, Potential deterioration of medical condition, Increased discomfort during transfer, Possible worsening of condition or death during transfer   Accepting Physician  Dr Nereyda Gao Name, [facility]   Sending MD Dr Betsy Peñaloza   Provider Certification  Consent was not obtained as patient is committed to psychiatric facility and transfer is mandated, General risk, such as traffic hazards, adverse weather conditions, rough terrain or turbulence, possible failure of equipment (including vehicle or aircraft), or consequences of actions of persons outside the control of the transport personnel, Unanticipated needs of medical equipment and personnel during transport, Risk of worsening condition, The possibility of a transport vehicle being unavailable, The patient is stable for psychiatric transfer because they are medically stable, and is protected from harming him/herself or others during transport      RN Documentation      [address] Name, [facility]      Follow-up Information    None       Discharge Medication List as of 8/12/2018 11:53 PM      CONTINUE these medications which have NOT CHANGED    Details   ALPRAZolam (XANAX) 2 MG tablet Take by mouth 2 (two) times a day as needed for anxiety, Historical Med      divalproex sodium (DEPAKOTE) 500 mg EC tablet Take by mouth every 8 (eight) hours, Historical Med      DOXEPIN HCL PO Take 100 mg by mouth, Historical Med      FLUoxetine HCl (PROZAC PO) Take by mouth, Historical Med           No discharge procedures on file         ED Provider  Electronically Signed by     Tristan Waller DO  08/13/18 5962

## 2019-01-25 ENCOUNTER — HOSPITAL ENCOUNTER (EMERGENCY)
Facility: HOSPITAL | Age: 29
Discharge: HOME | End: 2019-01-25
Attending: EMERGENCY MEDICINE | Admitting: EMERGENCY MEDICINE
Payer: COMMERCIAL

## 2019-01-25 VITALS
SYSTOLIC BLOOD PRESSURE: 108 MMHG | OXYGEN SATURATION: 99 % | RESPIRATION RATE: 14 BRPM | HEART RATE: 110 BPM | WEIGHT: 150 LBS | TEMPERATURE: 97.6 F | BODY MASS INDEX: 24.21 KG/M2 | DIASTOLIC BLOOD PRESSURE: 75 MMHG

## 2019-01-25 DIAGNOSIS — R41.82 ALTERED MENTAL STATUS: ICD-10-CM

## 2019-01-25 DIAGNOSIS — F19.10 DRUG ABUSE (HCC): Primary | ICD-10-CM

## 2019-01-25 LAB
ALBUMIN SERPL BCP-MCNC: 4.5 G/DL (ref 3–5.2)
ALP SERPL-CCNC: 87 U/L (ref 43–122)
ALT SERPL W P-5'-P-CCNC: 14 U/L (ref 9–52)
AMPHETAMINES SERPL QL SCN: POSITIVE
ANION GAP SERPL CALCULATED.3IONS-SCNC: 7 MMOL/L (ref 5–14)
AST SERPL W P-5'-P-CCNC: 19 U/L (ref 17–59)
BACTERIA UR QL AUTO: ABNORMAL /HPF
BARBITURATES UR QL: NEGATIVE
BASOPHILS # BLD AUTO: 0 THOUSANDS/ΜL (ref 0–0.1)
BASOPHILS NFR BLD AUTO: 0 % (ref 0–1)
BENZODIAZ UR QL: POSITIVE
BILIRUB SERPL-MCNC: 0.5 MG/DL
BILIRUB UR QL STRIP: NEGATIVE
BUN SERPL-MCNC: 7 MG/DL (ref 5–25)
CALCIUM SERPL-MCNC: 9.6 MG/DL (ref 8.4–10.2)
CHLORIDE SERPL-SCNC: 104 MMOL/L (ref 97–108)
CLARITY UR: CLEAR
CO2 SERPL-SCNC: 32 MMOL/L (ref 22–30)
COCAINE UR QL: NEGATIVE
COLOR UR: ABNORMAL
CREAT SERPL-MCNC: 0.71 MG/DL (ref 0.7–1.5)
EOSINOPHIL # BLD AUTO: 0.2 THOUSAND/ΜL (ref 0–0.4)
EOSINOPHIL NFR BLD AUTO: 2 % (ref 0–6)
ERYTHROCYTE [DISTWIDTH] IN BLOOD BY AUTOMATED COUNT: 13.9 %
ETHANOL SERPL-MCNC: <10 MG/DL (ref 0–10)
GFR SERPL CREATININE-BSD FRML MDRD: 129 ML/MIN/1.73SQ M
GLUCOSE SERPL-MCNC: 110 MG/DL (ref 70–99)
GLUCOSE UR STRIP-MCNC: NEGATIVE MG/DL
HCT VFR BLD AUTO: 38.4 % (ref 41–53)
HGB BLD-MCNC: 12.6 G/DL (ref 13.5–17.5)
HGB UR QL STRIP.AUTO: NEGATIVE
KETONES UR STRIP-MCNC: NEGATIVE MG/DL
LEUKOCYTE ESTERASE UR QL STRIP: 25
LYMPHOCYTES # BLD AUTO: 2.7 THOUSANDS/ΜL (ref 0.5–4)
LYMPHOCYTES NFR BLD AUTO: 23 % (ref 25–45)
MCH RBC QN AUTO: 29.7 PG (ref 26–34)
MCHC RBC AUTO-ENTMCNC: 32.7 G/DL (ref 31–36)
MCV RBC AUTO: 91 FL (ref 80–100)
METHADONE UR QL: NEGATIVE
MONOCYTES # BLD AUTO: 0.8 THOUSAND/ΜL (ref 0.2–0.9)
MONOCYTES NFR BLD AUTO: 7 % (ref 1–10)
MUCOUS THREADS UR QL AUTO: ABNORMAL
NEUTROPHILS # BLD AUTO: 7.9 THOUSANDS/ΜL (ref 1.8–7.8)
NEUTS SEG NFR BLD AUTO: 68 % (ref 45–65)
NITRITE UR QL STRIP: NEGATIVE
NON-SQ EPI CELLS URNS QL MICRO: ABNORMAL /HPF
OPIATES UR QL SCN: POSITIVE
PCP UR QL: NEGATIVE
PH UR STRIP.AUTO: 6.5 [PH] (ref 4.5–8)
PLATELET # BLD AUTO: 371 THOUSANDS/UL (ref 150–450)
PMV BLD AUTO: 7.5 FL (ref 8.9–12.7)
POTASSIUM SERPL-SCNC: 3.5 MMOL/L (ref 3.6–5)
PROT SERPL-MCNC: 8.3 G/DL (ref 5.9–8.4)
PROT UR STRIP-MCNC: ABNORMAL MG/DL
RBC # BLD AUTO: 4.23 MILLION/UL (ref 4.5–5.9)
RBC #/AREA URNS AUTO: ABNORMAL /HPF
SODIUM SERPL-SCNC: 143 MMOL/L (ref 137–147)
SP GR UR STRIP.AUTO: 1.01 (ref 1–1.04)
THC UR QL: POSITIVE
UROBILINOGEN UA: 1 MG/DL
WBC # BLD AUTO: 11.5 THOUSAND/UL (ref 4.5–11)
WBC #/AREA URNS AUTO: ABNORMAL /HPF

## 2019-01-25 PROCEDURE — 80053 COMPREHEN METABOLIC PANEL: CPT | Performed by: EMERGENCY MEDICINE

## 2019-01-25 PROCEDURE — 36415 COLL VENOUS BLD VENIPUNCTURE: CPT | Performed by: EMERGENCY MEDICINE

## 2019-01-25 PROCEDURE — 96361 HYDRATE IV INFUSION ADD-ON: CPT

## 2019-01-25 PROCEDURE — 85025 COMPLETE CBC W/AUTO DIFF WBC: CPT | Performed by: EMERGENCY MEDICINE

## 2019-01-25 PROCEDURE — 96374 THER/PROPH/DIAG INJ IV PUSH: CPT

## 2019-01-25 PROCEDURE — 81001 URINALYSIS AUTO W/SCOPE: CPT | Performed by: EMERGENCY MEDICINE

## 2019-01-25 PROCEDURE — 99283 EMERGENCY DEPT VISIT LOW MDM: CPT

## 2019-01-25 PROCEDURE — 80307 DRUG TEST PRSMV CHEM ANLYZR: CPT | Performed by: EMERGENCY MEDICINE

## 2019-01-25 PROCEDURE — 80320 DRUG SCREEN QUANTALCOHOLS: CPT | Performed by: EMERGENCY MEDICINE

## 2019-01-25 RX ORDER — NALOXONE HYDROCHLORIDE 1 MG/ML
2 INJECTION INTRAMUSCULAR; INTRAVENOUS; SUBCUTANEOUS ONCE
Status: COMPLETED | OUTPATIENT
Start: 2019-01-25 | End: 2019-01-25

## 2019-01-25 RX ADMIN — NALOXONE HYDROCHLORIDE 2 MG: 1 INJECTION PARENTERAL at 14:05

## 2019-01-25 RX ADMIN — SODIUM CHLORIDE 1000 ML: 9 INJECTION, SOLUTION INTRAVENOUS at 14:06

## 2019-01-25 NOTE — ED NOTES
Patient awake and alert, gait steady    [facility], [address] [place] [address]  01/25/19 0746

## 2019-01-25 NOTE — ED NOTES
Patient woke up after administration of Narcan    Patient awake, alert and oriented     Bailee Garcia RN  01/25/19 2522

## 2019-01-25 NOTE — ED NOTES
Patient awake and alert but remains slightly sleepy  Asking to leave, wants to call sister to come pick him  Patient used telephone but number he had wasn't a correct number  Patient informed that he needed to stay until he is more awake  Patient agreeable to same  Given maxim Sanchez, RN  01/25/19 1843

## 2019-01-25 NOTE — ED PROVIDER NOTES
History  Chief Complaint   Patient presents with    Drug Problem     pt arrives via EMS, found stumbling around  pt falling asleeping during triage  pt states he took depakote and xanax (1) today  pt states his last heroin use was yesterday  pt denies SI/HI/AH/VH  67-year-old gentleman presents with suspected drug abuse  He was found stumbling around the streets stating that he was going to [facility] two speak to a counselor  He admits to smoking marijuana but denies any recent heroin abuse  On arrival he is noted to somnolent intermittently stuperous but is arousable  He denies any trauma or other acute issues or complaints  Drug Problem   Severity:  Moderate  Onset quality:  Gradual  Timing:  Intermittent  Progression:  Waxing and waning  Chronicity:  Recurrent  Relieved by:  Not using drugs  Worsened by:  More drugs  Ineffective treatments:  None tried  Associated symptoms: no abdominal pain, no chest pain, no congestion, no cough, no diarrhea, no ear pain, no fatigue, no fever, no headaches, no loss of consciousness, no myalgias, no nausea, no rash, no rhinorrhea, no shortness of breath, no sore throat, no vomiting and no wheezing        Prior to Admission Medications   Prescriptions Last Dose Informant Patient Reported? Taking? ALPRAZolam (XANAX) 2 MG tablet 1/25/2019 at Unknown time  Yes Yes   Sig: Take by mouth 2 (two) times a day as needed for anxiety   DOXEPIN HCL PO   Yes Yes   Sig: Take 100 mg by mouth   FLUoxetine HCl (PROZAC PO)   Yes Yes   Sig: Take by mouth   divalproex sodium (DEPAKOTE) 500 mg EC tablet 1/25/2019 at Unknown time  Yes Yes   Sig: Take by mouth every 8 (eight) hours      Facility-Administered Medications: None       Past Medical History:   Diagnosis Date    Psychiatric disorder     Schizo-affective schizophrenia ([facility] [address] )        History reviewed  No pertinent surgical history  History reviewed  No pertinent family history    I have reviewed and agree with the history as documented  Social History   Substance Use Topics    Smoking status: Current Some Day Smoker     Packs/day: 1 50     Years: 12 00    Smokeless tobacco: Never Used    Alcohol use Yes      Comment: socially        Review of Systems   Constitutional: Negative for fatigue and fever  HENT: Negative for congestion, ear pain, rhinorrhea and sore throat  Respiratory: Negative for cough, shortness of breath and wheezing  Cardiovascular: Negative for chest pain  Gastrointestinal: Negative for abdominal pain, diarrhea, nausea and vomiting  Musculoskeletal: Negative for myalgias  Skin: Negative for rash  Neurological: Negative for loss of consciousness and headaches  Physical Exam  Physical Exam   Constitutional: He is oriented to person, place, and time  He appears well-developed and well-nourished  No distress  HENT:   Head: Normocephalic and atraumatic  Nose: Nose normal    Mouth/Throat: Oropharynx is clear and moist    Eyes: Conjunctivae are normal    Pinpoint pupils   Neck: Neck supple  Cardiovascular: Normal rate, regular rhythm and normal heart sounds  Pulmonary/Chest: Effort normal and breath sounds normal  No respiratory distress  Abdominal: Soft  Bowel sounds are normal  He exhibits no distension  There is no tenderness  There is no guarding  Musculoskeletal: Normal range of motion  He exhibits no edema  Neurological: He is alert and oriented to person, place, and time  GCS eye subscore is 4  GCS verbal subscore is 4  GCS motor subscore is 6  Patient is somnolent with an unsteady gait   Skin: Skin is warm and dry  Capillary refill takes less than 2 seconds  He is not diaphoretic  Psychiatric: He has a normal mood and affect  His speech is delayed and slurred  He is slowed  Cognition and memory are impaired  He expresses no homicidal and no suicidal ideation  Nursing note and vitals reviewed        Vital Signs  ED Triage Vitals   Temperature Pulse Respirations Blood Pressure SpO2   01/25/19 1345 01/25/19 1345 01/25/19 1345 01/25/19 1345 01/25/19 1345   97 6 °F (36 4 °C) (!) 114 (!) 10 107/64 100 %      Temp Source Heart Rate Source Patient Position - Orthostatic VS BP Location FiO2 (%)   01/25/19 1345 01/25/19 1345 01/25/19 1345 01/25/19 1345 --   Tympanic Monitor Sitting Left arm       Pain Score       01/25/19 1551       No Pain           Vitals:    01/25/19 1345 01/25/19 1551   BP: 107/64 108/75   Pulse: (!) 114 (!) 110   Patient Position - Orthostatic VS: Sitting Lying       Visual Acuity      ED Medications  Medications   sodium chloride 0 9 % bolus 1,000 mL (0 mL Intravenous Stopped 1/25/19 1503)   naloxone (NARCAN) injection 2 mg (2 mg Intravenous Given 1/25/19 1405)       Diagnostic Studies  Results Reviewed     Procedure Component Value Units Date/Time    Urine Microscopic [[id number]]  (Abnormal) Collected:  01/25/19 1557    Lab Status:  Final result Specimen:  Urine from Urine, Clean Catch Updated:  01/25/19 1623     RBC, UA 0-1 (A) /hpf      WBC, UA 4-10 (A) /hpf      Epithelial Cells Occasional /hpf      Bacteria, UA Occasional /hpf      MUCUS THREADS Moderate (A)    Rapid drug screen, urine [[id number]]  (Abnormal) Collected:  01/25/19 1557    Lab Status:  Final result Specimen:  Urine from Urine, Clean Catch Updated:  01/25/19 1621     Amph/Meth UR Positive (A)     Barbiturate Ur Negative     Benzodiazepine Urine Positive (A)     Cocaine Urine Negative     Methadone Urine Negative     Opiate Urine Positive (A)     PCP Ur Negative     THC Urine Positive (A)    Narrative:         Presumptive report  If requested, specimen will be sent to reference lab for confirmation  FOR MEDICAL PURPOSES ONLY  IF CONFIRMATION NEEDED PLEASE CONTACT THE LAB WITHIN 5 DAYS      Drug Screen Cutoff Levels:  AMPHETAMINE/METHAMPHETAMINES  1000 ng/mL  BARBITURATES     200 ng/mL  BENZODIAZEPINES     200 ng/mL  COCAINE      300 ng/mL  METHADONE      300 ng/mL  OPIATES      300 ng/mL  PHENCYCLIDINE     25 ng/mL  THC       50 ng/mL    UA w Reflex to Microscopic [[id number]]  (Abnormal) Collected:  01/25/19 1557    Lab Status:  Final result Specimen:  Urine from Urine, Clean Catch Updated:  01/25/19 1609     Color, UA Lexie (A)     Clarity, UA Clear     Specific Gravity, UA 1 015     pH, UA 6 5     Leukocytes, UA 25 0 (A)     Nitrite, UA Negative     Protein, UA 30 (1+) (A) mg/dl      Glucose, UA Negative mg/dl      Ketones, UA Negative mg/dl      Bilirubin, UA Negative     Blood, UA Negative     UROBILINOGEN UA 1 0 mg/dL     Comprehensive metabolic panel [[id number]]  (Abnormal) Collected:  01/25/19 1405    Lab Status:  Final result Specimen:  Blood from Arm, Right Updated:  01/25/19 1442     Sodium 143 mmol/L      Potassium 3 5 (L) mmol/L      Chloride 104 mmol/L      CO2 32 (H) mmol/L      ANION GAP 7 mmol/L      BUN 7 mg/dL      Creatinine 0 71 mg/dL      Glucose 110 (H) mg/dL      Calcium 9 6 mg/dL      AST 19 U/L      ALT 14 U/L      Alkaline Phosphatase 87 U/L      Total Protein 8 3 g/dL      Albumin 4 5 g/dL      Total Bilirubin 0 50 mg/dL      eGFR 129 ml/min/1 73sq m     Narrative:         National Kidney Disease Education Program recommendations are as follows:  GFR calculation is accurate only with a steady state creatinine  Chronic Kidney disease less than 60 ml/min/1 73 sq  meters  Kidney failure less than 15 ml/min/1 73 sq  meters      Ethanol [[id number]]  (Normal) Collected:  01/25/19 1405    Lab Status:  Final result Specimen:  Blood from Arm, Right Updated:  01/25/19 1442     Ethanol Lvl <10 mg/dL     CBC and differential [[id number]]  (Abnormal) Collected:  01/25/19 1405    Lab Status:  Final result Specimen:  Blood from Arm, Right Updated:  01/25/19 1430     WBC 11 50 (H) Thousand/uL      RBC 4 23 (L) Million/uL      Hemoglobin 12 6 (L) g/dL      Hematocrit 38 4 (L) %      MCV 91 fL      MCH 29 7 pg      MCHC 32 7 g/dL      RDW 13 9 %      MPV 7 5 (L) fL      Platelets 456 Thousands/uL      Neutrophils Relative 68 (H) %      Lymphocytes Relative 23 (L) %      Monocytes Relative 7 %      Eosinophils Relative 2 %      Basophils Relative 0 %      Neutrophils Absolute 7 90 (H) Thousands/µL      Lymphocytes Absolute 2 70 Thousands/µL      Monocytes Absolute 0 80 Thousand/µL      Eosinophils Absolute 0 20 Thousand/µL      Basophils Absolute 0 00 Thousands/µL                  No orders to display              Procedures  Procedures       Phone Contacts  ED Phone Contact    ED Course                               MDM  Number of Diagnoses or Management Options  Altered mental status:   Drug abuse [facility]):     CritCare Time    Disposition  Final diagnoses:   Drug abuse ([facility] [address] )   Altered mental status     Time reflects when diagnosis was documented in both MDM as applicable and the Disposition within this note     Time User Action Codes Description Comment    1/25/2019  4:29 PM Lonny Michel Add [F19 10] Drug abuse ([facility] [address] )     1/25/2019  4:29 PM Lonny Michel Add [R41 82] Altered mental status       ED Disposition     ED Disposition Condition Comment    Discharge  Lurdes Minesh FramePritchard discharge to home/self care  Condition at discharge: Good        Follow-up Information     Follow up With Specialties Details Why Contact Info    Jamal Booker MD Family Medicine   [facility] [address] [address]  [address]  [phone]            Patient's Medications   Discharge Prescriptions    No medications on file     No discharge procedures on file      ED Provider  Electronically Signed by           Anders Salomon DO  01/28/19 7282

## 2019-01-25 NOTE — DISCHARGE INSTRUCTIONS
Altered Mental Status   WHAT YOU NEED TO KNOW:   Altered mental status (AMS) is a disruption in how your brain works that causes a change in behavior  This change can happen suddenly or over days  AMS ranges from slight confusion to total disorientation and increased sleepiness to coma  DISCHARGE INSTRUCTIONS:   Medicines:   · Antibiotics  help fight or prevent infection  Take your antibiotics until they are gone, even if you feel better  · Take your medicine as directed  Contact your healthcare provider if you think your medicine is not helping or if you have side effects  Tell him of her if you are allergic to any medicine  Keep a list of the medicines, vitamins, and herbs you take  Include the amounts, and when and why you take them  Bring the list or the pill bottles to follow-up visits  Carry your medicine list with you in case of an emergency  Follow up with your healthcare provider as directed:  Write down your questions so you remember to ask them during your visits  Contact your healthcare provider if:   · You have sudden changes in behavior  · You are more sleepy or confused than usual      · You have questions or concerns about your condition or care  Seek care immediately or call 911 if:   · Your speech is slurred or you are rambling  · You have a seizure  · You are not able to move any part of your body freely  · Someone close to you cannot wake you up  © 2017 [address] Information is for End User's use only and may not be sold, redistributed or otherwise used for commercial purposes  All illustrations and images included in CareNotes® are the copyrighted property of A D A M , Inc  or [employer] [place]  The above information is an  only  It is not intended as medical advice for individual conditions or treatments   Talk to your doctor, nurse or pharmacist before following any medical regimen to see if it is safe and effective for you   Polysubstance Abuse   WHAT YOU NEED TO KNOW:   Polysubstance abuse is the abuse of 2 or more drugs that cause impairment or distress  Examples include alcohol, nicotine, marijuana, cocaine, heroin, methamphetamine, hallucinogens such as mushrooms, or inhalants such as paint thinner  Prescribed medicines, such as opioids for pain or benzodiazepines for anxiety, are also commonly abused  DISCHARGE INSTRUCTIONS:   Call 911 for any of the following:   · You feel you might harm yourself or others  Return to the emergency department if:   · You have a seizure  · You have chest pain and your heart is beating faster than usual      · You have new shortness of breath  · You are dizzy and lightheaded  Contact your healthcare provider or therapist if:   · You are using drugs and think you are pregnant  · You have withdrawal symptoms and want to start using drugs again  · You have questions or concerns about your condition or care  Risks of polysubstance abuse:   · Drug dependence  is when you continue to use drugs, even when you know the risks  Polysubstance abuse can damage your heart, brain, lungs, liver, and gastrointestinal tract  You continue even when it causes problems with work, school, or relationships  You may have difficulty finding or keeping a job because of your drug dependence  · Drug tolerance  is when you need to use more drugs, or use them more often, to get the effects you want  You may not be able to stop using the drugs  When you try to stop, you may have withdrawal symptoms and strong cravings for the drugs  · Drug overdose  can occur when you take more drugs than your body can handle  This may be a small amount or a large amount  You can lose consciousness or have a seizure or stroke  Your heart can stop beating, or you can stop breathing  You may die from a drug overdose    Medicines:   · Withdrawal medicines  may be given according to the types of drugs you are abusing  Withdrawal from drugs can cause serious, life-threatening side effects  Certain medicines can help decrease your withdrawal symptoms and your desire for the drug  Ask for more information about the withdrawal medicines you may need  · Mood stabilizers  may be given to help prevent or treat depression or anxiety caused by drug abuse and withdrawal      · Take your medicine as directed  Contact your healthcare provider if you think your medicine is not helping or if you have side effects  Tell him or her if you are allergic to any medicine  Keep a list of the medicines, vitamins, and herbs you take  Include the amounts, and when and why you take them  Bring the list or the pill bottles to follow-up visits  Carry your medicine list with you in case of an emergency  Follow up with your healthcare provider as directed: You may be referred to a specialist to treat health conditions caused by your drug use  This includes mental health, heart, or lung specialists  Write down your questions so you remember to ask them during your visits  Therapy:  You may need therapy and support to stop using drugs:  · Cognitive and behavioral therapy  helps you change your thinking and behavior  It can help you develop plans to avoid the situations that make you want to use drugs  It also helps you cope with the feelings of wanting to use drugs  You may have individual or group therapy  · Contingency management  helps you set drug-free goals with a therapist  Lencho Tolbert will decide ways to celebrate your success when you reach a goal      · Family therapy and support groups  allow you and your family members to talk to and be encouraged by other people affected by drug abuse  You and your family members may attend together or separately  Ask your healthcare provider for information about programs in your area    How polysubstance abuse affects unborn or  babies:   · If you are pregnant or get pregnant while using drugs, you may have a miscarriage or give birth early  Your baby may be born addicted to the drugs  · Do not breastfeed your baby if you use drugs  Drugs pass from your bloodstream into your breast milk and affect your baby's health  Talk with your healthcare provider if you are using drugs and breastfeeding  For support and more information:   · Alcoholics Anonymous  Web Address: http://www joshi info/  · PANCHO Epps on Drug and Alcohol Information  Phone: 1- 056 - 3542177  Web Address: Recommendo  · [employer] on Alcoholism and Drug Dependence  Carley Evangelista [address] [address]  Phone: 9- 543 - 876-1491  Phone: 0- 506 - [phone]  Web Address: [employer] br  org  © 2017 [address] Information is for End User's use only and may not be sold, redistributed or otherwise used for commercial purposes  All illustrations and images included in CareNotes® are the copyrighted property of [employer]  or [employer] [place]  The above information is an  only  It is not intended as medical advice for individual conditions or treatments  Talk to your doctor, nurse or pharmacist before following any medical regimen to see if it is safe and effective for you

## 2019-01-25 NOTE — ED NOTES
Pt is now awake, alert, oriented x4  Speech clear  Gait steady when ambulating to bathroom   Pt denies dizziness and SOB     Verdis Night, RN  01/25/19 8371

## 2019-01-29 ENCOUNTER — HOSPITAL ENCOUNTER (EMERGENCY)
Facility: HOSPITAL | Age: 29
End: 2019-01-30
Attending: EMERGENCY MEDICINE
Payer: COMMERCIAL

## 2019-01-29 DIAGNOSIS — R45.851 SUICIDAL IDEATION: ICD-10-CM

## 2019-01-29 DIAGNOSIS — F19.10 SUBSTANCE ABUSE (HCC): Primary | ICD-10-CM

## 2019-01-29 LAB
AMPHETAMINES SERPL QL SCN: POSITIVE
BACTERIA UR QL AUTO: ABNORMAL /HPF
BARBITURATES UR QL: NEGATIVE
BENZODIAZ UR QL: NEGATIVE
BILIRUB UR QL STRIP: NEGATIVE
CLARITY UR: ABNORMAL
COCAINE UR QL: NEGATIVE
COLOR UR: ABNORMAL
ETHANOL EXG-MCNC: 0 MG/DL
GLUCOSE UR STRIP-MCNC: NEGATIVE MG/DL
HGB UR QL STRIP.AUTO: NEGATIVE
HYALINE CASTS #/AREA URNS LPF: ABNORMAL /LPF
KETONES UR STRIP-MCNC: NEGATIVE MG/DL
LEUKOCYTE ESTERASE UR QL STRIP: NEGATIVE
METHADONE UR QL: NEGATIVE
NITRITE UR QL STRIP: NEGATIVE
NON-SQ EPI CELLS URNS QL MICRO: ABNORMAL /HPF
OPIATES UR QL SCN: POSITIVE
PCP UR QL: NEGATIVE
PH UR STRIP.AUTO: 6.5 [PH] (ref 4.5–8)
PROT UR STRIP-MCNC: ABNORMAL MG/DL
RBC #/AREA URNS AUTO: ABNORMAL /HPF
SP GR UR STRIP.AUTO: >=1.03 (ref 1–1.03)
THC UR QL: POSITIVE
UROBILINOGEN UR QL STRIP.AUTO: 2 E.U./DL
WBC #/AREA URNS AUTO: ABNORMAL /HPF

## 2019-01-29 PROCEDURE — 82075 ASSAY OF BREATH ETHANOL: CPT | Performed by: EMERGENCY MEDICINE

## 2019-01-29 PROCEDURE — 99285 EMERGENCY DEPT VISIT HI MDM: CPT

## 2019-01-29 PROCEDURE — 81001 URINALYSIS AUTO W/SCOPE: CPT

## 2019-01-29 PROCEDURE — 80307 DRUG TEST PRSMV CHEM ANLYZR: CPT | Performed by: EMERGENCY MEDICINE

## 2019-01-29 RX ORDER — NICOTINE 21 MG/24HR
21 PATCH, TRANSDERMAL 24 HOURS TRANSDERMAL ONCE
Status: COMPLETED | OUTPATIENT
Start: 2019-01-29 | End: 2019-01-30

## 2019-01-29 RX ORDER — DIVALPROEX SODIUM 500 MG/1
500 TABLET, DELAYED RELEASE ORAL EVERY 8 HOURS SCHEDULED
Status: DISCONTINUED | OUTPATIENT
Start: 2019-01-29 | End: 2019-01-30 | Stop reason: HOSPADM

## 2019-01-29 RX ORDER — ALPRAZOLAM 0.5 MG/1
2 TABLET ORAL ONCE
Status: COMPLETED | OUTPATIENT
Start: 2019-01-29 | End: 2019-01-30

## 2019-01-29 RX ORDER — LORAZEPAM 0.5 MG/1
0.5 TABLET ORAL ONCE
Status: COMPLETED | OUTPATIENT
Start: 2019-01-29 | End: 2019-01-29

## 2019-01-29 RX ORDER — DOXEPIN HYDROCHLORIDE 10 MG/1
10 CAPSULE ORAL
Status: DISCONTINUED | OUTPATIENT
Start: 2019-01-29 | End: 2019-01-30 | Stop reason: HOSPADM

## 2019-01-29 RX ORDER — FLUOXETINE HYDROCHLORIDE 20 MG/1
20 CAPSULE ORAL DAILY
Status: DISCONTINUED | OUTPATIENT
Start: 2019-01-30 | End: 2019-01-30 | Stop reason: HOSPADM

## 2019-01-29 RX ORDER — LANOLIN ALCOHOL/MO/W.PET/CERES
3 CREAM (GRAM) TOPICAL
Status: DISCONTINUED | OUTPATIENT
Start: 2019-01-29 | End: 2019-01-30 | Stop reason: HOSPADM

## 2019-01-29 RX ADMIN — NICOTINE 21 MG: 21 PATCH, EXTENDED RELEASE TRANSDERMAL at 18:29

## 2019-01-29 RX ADMIN — MELATONIN TAB 3 MG 3 MG: 3 TAB at 23:31

## 2019-01-29 RX ADMIN — LORAZEPAM 0.5 MG: 0.5 TABLET ORAL at 20:10

## 2019-01-29 NOTE — ED NOTES
Patient had 1 clear patient belongings bag placed in the zone 5/6 med room floor        [facility]  01/29/19 6619

## 2019-01-29 NOTE — ED ATTENDING ATTESTATION
Connor Raphael DO, saw and evaluated the patient  I have discussed the patient with the resident/non-physician practitioner and agree with the resident's/non-physician practitioner's findings, Plan of Care, and MDM as documented in the resident's/non-physician practitioner's note, except where noted  All available labs and Radiology studies were reviewed  At this point I agree with the current assessment done in the Emergency Department  I have conducted an independent evaluation of this patient a history and physical is as follows:    32 yo male with hx of polysubstance abuse - heroin, meth  pts mother was here a couple hours ago as a cardiac arrest likely from overdose, in which she   Apparently he and his mother did drugs together often  pts sister brought pt here as she was concerned he was going to overdose  He told his sister that he was "going to take my social security check to see what it gets me" which his sister felt to mean that he was going to buy a lot of drugs  Pt states he hasn't done meth in "a few days"  pts sister states he did heroin this AM, but pt denies  Pt alert, oriented  Denies SI, HI, AH/VH  He understands that his family is worried about him and want him to get help  At this time pt doesn't seem committed to rehab/HOST  Imp: polysubstance abuse  Plan: crisis eval for drug rehab resources          Critical Care Time  Procedures

## 2019-01-29 NOTE — ED PROVIDER NOTES
History  Chief Complaint   Patient presents with    Psychiatric Evaluation     Pt sister states "our mom just  and I am afraid that he is gonna die cause he is using  I think he is using meth and heroin " Pt states "i'm not using both "     HPI   Patient is a 52-year-old gentleman with a history of polysubstance abuse who presents to the emergency department with his family for psychiatric evaluation  Patient lives with his mom, who presented earlier today as a cardiac arrest after presumed heroin overdose  He was with his mom at the time that she collapsed  Patient apparently made some unclear comments to his sister who brought him to the emergency department regarding "seeing how much his social security check will get him," which sister interpreted as him planning to buy a large amount of heroin to overdose  He is currently denying any suicidal ideation or thoughts of harming others  He says that he has never attempted to overdose or commit suicide previously  His sister and brother are extremely concerned because patient and his mother were "inseparable  They would go everywhere together and do drugs together "  Patient does endorse feeling depressed and says he feels like he has no where to go  He tried to lie down at home earlier today after hearing that his mom had , at which time patient's sister decided to bring him to the emergency department for formal psychiatric evaluation  He abuses multiple drugs including heroin, meth, cocaine, marijuana  He is relatively vague about the last time that he used any drugs but denies any drug use this morning  Family does not believe this  He does express some weak interest in drug rehab, but is not sure how committed he would be  Besides the shock from hearing that is mother  he has no other complaints  Denies dizziness, seizure, chest pain, shortness of breath, abdominal pain, nausea, vomiting          Prior to Admission Medications Prescriptions Last Dose Informant Patient Reported? Taking? ALPRAZolam (XANAX) 2 MG tablet Past Week at Unknown time  Yes Yes   Sig: Take by mouth 2 (two) times a day as needed for anxiety   DOXEPIN HCL PO Past Week at Unknown time  Yes Yes   Sig: Take 100 mg by mouth   FLUoxetine HCl (PROZAC PO) Past Week at Unknown time  Yes Yes   Sig: Take by mouth   divalproex sodium (DEPAKOTE) 500 mg EC tablet Past Week at Unknown time  Yes Yes   Sig: Take by mouth every 8 (eight) hours      Facility-Administered Medications: None       Past Medical History:   Diagnosis Date    Psychiatric disorder     Schizo-affective schizophrenia ([facility] [address] )        History reviewed  No pertinent surgical history  History reviewed  No pertinent family history  I have reviewed and agree with the history as documented  Social History   Substance Use Topics    Smoking status: Current Some Day Smoker     Packs/day: 1 50     Years: 12 00     Types: Cigarettes    Smokeless tobacco: Never Used      Comment: " i don't know how many"    Alcohol use No      Comment: pt denies        Review of Systems   Constitutional: Negative for chills, diaphoresis, fatigue and fever  HENT: Negative for hearing loss, nosebleeds, sinus pain and sore throat  Eyes: Negative for photophobia, pain, redness and visual disturbance  Respiratory: Negative for cough, chest tightness, shortness of breath, wheezing and stridor  Cardiovascular: Negative for chest pain, palpitations and leg swelling  Gastrointestinal: Negative for abdominal distention, abdominal pain, constipation, diarrhea, nausea and vomiting  Genitourinary: Negative for dysuria, flank pain and hematuria  Musculoskeletal: Negative for back pain, neck pain and neck stiffness  Skin: Negative for pallor and rash  Allergic/Immunologic: Negative for immunocompromised state  Neurological: Negative for syncope, weakness, numbness and headaches  Hematological: Negative for adenopathy  Psychiatric/Behavioral: Negative for agitation, confusion and suicidal ideas  All other systems reviewed and are negative  Physical Exam  ED Triage Vitals   Temperature Pulse Respirations Blood Pressure SpO2   01/29/19 1452 01/29/19 1449 01/29/19 1449 01/29/19 1449 01/29/19 1449   97 5 °F (36 4 °C) (!) 110 18 138/77 98 %      Temp Source Heart Rate Source Patient Position - Orthostatic VS BP Location FiO2 (%)   01/29/19 1452 01/29/19 1449 01/29/19 1449 01/29/19 1449 --   Oral Monitor Sitting Left arm       Pain Score       01/29/19 1449       3           Orthostatic Vital Signs  Vitals:    01/30/19 0038 01/30/19 0609 01/30/19 1130 01/30/19 1537   BP: 151/89 136/56 121/56 111/63   Pulse: 105 88 87 (!) 112   Patient Position - Orthostatic VS:   Sitting Sitting       Physical Exam   Constitutional: He is oriented to person, place, and time  He appears well-developed and well-nourished  No distress  HENT:   Head: Normocephalic and atraumatic  Right Ear: External ear normal    Left Ear: External ear normal    Nose: Nose normal    Mouth/Throat: Oropharynx is clear and moist    Eyes: Pupils are equal, round, and reactive to light  Conjunctivae and EOM are normal  No scleral icterus  Mydriasis  Neck: Normal range of motion  Neck supple  No JVD present  No tracheal deviation present  Cardiovascular: Regular rhythm  Exam reveals no gallop and no friction rub  No murmur heard  Tachycardic  Pulmonary/Chest: Effort normal and breath sounds normal  No stridor  No respiratory distress  He has no wheezes  He has no rales  He exhibits no tenderness  Abdominal: Soft  He exhibits no distension  There is no tenderness  There is no rebound and no guarding  Musculoskeletal: Normal range of motion  He exhibits no edema or tenderness  Lymphadenopathy:     He has no cervical adenopathy  Neurological: He is alert and oriented to person, place, and time  No cranial nerve deficit or sensory deficit   He exhibits normal muscle tone  Skin: Skin is warm and dry  He is not diaphoretic  No erythema  No pallor  Scattered sores on face  Psychiatric: He has a normal mood and affect  His behavior is normal    Nursing note and vitals reviewed  ED Medications  Medications   nicotine (NICODERM CQ) 21 mg/24 hr TD 24 hr patch 21 mg (21 mg Transdermal Medication Applied 1/29/19 1829)   melatonin tablet 3 mg (3 mg Oral Given 1/29/19 2331)   divalproex sodium (DEPAKOTE) EC tablet 500 mg (500 mg Oral Given 1/30/19 1416)   doxepin (SINEquan) capsule 10 mg (10 mg Oral Given 1/30/19 0020)   FLUoxetine (PROzac) capsule 20 mg (20 mg Oral Given 1/30/19 1325)   LORazepam (ATIVAN) tablet 0 5 mg (0 5 mg Oral Given 1/29/19 2010)   ALPRAZolam (XANAX) tablet 2 mg (2 mg Oral Given 1/30/19 0020)   LORazepam (ATIVAN) tablet 1 mg (1 mg Oral Given 1/30/19 0730)   cloNIDine (CATAPRES) tablet 0 2 mg (0 2 mg Oral Given 1/30/19 0730)   ondansetron (ZOFRAN-ODT) dispersible tablet 4 mg (4 mg Oral Given 1/30/19 0849)       Diagnostic Studies  Results Reviewed     Procedure Component Value Units Date/Time    Rapid drug screen, urine [[id number]]  (Abnormal) Collected:  01/29/19 2001    Lab Status:  Final result Specimen:  Urine from Urine, Clean Catch Updated:  01/29/19 2040     Amph/Meth UR Positive (A)     Barbiturate Ur Negative     Benzodiazepine Urine Negative     Cocaine Urine Negative     Methadone Urine Negative     Opiate Urine Positive (A)     PCP Ur Negative     THC Urine Positive (A)    Narrative:         Presumptive report  If requested, specimen will be sent to reference lab for confirmation  FOR MEDICAL PURPOSES ONLY  IF CONFIRMATION NEEDED PLEASE CONTACT THE LAB WITHIN 5 DAYS      Drug Screen Cutoff Levels:  AMPHETAMINE/METHAMPHETAMINES  1000 ng/mL  BARBITURATES     200 ng/mL  BENZODIAZEPINES     200 ng/mL  COCAINE      300 ng/mL  METHADONE      300 ng/mL  OPIATES      300 ng/mL  PHENCYCLIDINE     25 ng/mL  THC       50 ng/mL Urine Microscopic [[id number]]  (Abnormal) Collected:  01/29/19 1958    Lab Status:  Final result Specimen:  Urine from Urine, Clean Catch Updated:  01/29/19 2014     RBC, UA 4-10 (A) /hpf      WBC, UA 4-10 (A) /hpf      Epithelial Cells None Seen /hpf      Bacteria, UA None Seen /hpf      Hyaline Casts, UA 10-25 (A) /lpf     ED Urine Macroscopic [[id number]]  (Abnormal) Collected:  01/29/19 1958    Lab Status:  Final result Specimen:  Urine Updated:  01/29/19 1956     Color, UA Lexie     Clarity, UA Slightly Cloudy     pH, UA 6 5     Leukocytes, UA Negative     Nitrite, UA Negative     Protein, UA 30 (1+) (A) mg/dl      Glucose, UA Negative mg/dl      Ketones, UA Negative mg/dl      Urobilinogen, UA 2 0 (A) E U /dl      Bilirubin, UA Negative     Blood, UA Negative     Specific Gravity, UA >=1 030    Narrative:       CLINITEK RESULT    POCT alcohol breath test [[id number]]  (Normal) Resulted:  01/29/19 1527    Lab Status:  Final result Updated:  01/29/19 1527     EXTBreath Alcohol 0 000                 No orders to display         Procedures  Procedures      Phone Consults  ED Phone Contact    ED Course  ED Course as of Jan 30 1729   Tue Jan 29, 2019   1716 Patient agreeable to 201            MDM  Number of Diagnoses or Management Options  Substance abuse ([facility] [address] ): established and worsening  Suicidal ideation: new and requires workup     Amount and/or Complexity of Data Reviewed  Clinical lab tests: ordered and reviewed  Tests in the medicine section of CPT®: ordered and reviewed  Discussion of test results with the performing providers: yes  Decide to obtain previous medical records or to obtain history from someone other than the patient: yes  Obtain history from someone other than the patient: yes  Review and summarize past medical records: yes  Discuss the patient with other providers: yes    Patient Progress  Patient progress: stable     69-year-old presents to the emergency department with family requesting psychiatric evaluation after his mom  from presumptive drug overdose this morning  Patient currently denies any SI/HI  No AH/VH  Patient's sister is very concerned that he will overdose if he does not come into the hospital   Patient is wavering as to whether not he is willing to sign himself in  As he denies any suicidal ideation I do not see any grounds for a 302  Will have the ED crisis worker evaluate patient  Patient spoke with crisis worker and after further discussion with family was agreeable to signing a 61 51 81  He was accepted at [facility]  Disposition  Final diagnoses:   Substance abuse ([facility] [address] )   Suicidal ideation     Time reflects when diagnosis was documented in both MDM as applicable and the Disposition within this note     Time User Action Codes Description Comment    2019 12:51 AM Guanaco Cohen [F19 [id number]] Substance abuse ([facility] [address] )     2019 12:51 AM Guanaco Cohen [[id number]] Suicidal ideation       ED Disposition     ED Disposition Condition Date/Time Comment    Transfer to Another Facility   12:51 AM Ginger Chikigilbert Nicole should be transferred out to [facility]        MD Documentation      Most Recent Value   Patient Condition  The patient has been stabilized such that within reasonable medical probability, no material deterioration of the patient condition or the condition of the unborn child(marika) is likely to result from the transfer   Reason for Transfer  Level of Care needed not available at this facility [dual behavioral/drug rehab]   Benefits of Transfer  Specialized equipment and/or services available at the receiving facility (Include comment)________________________ [facility] medicine, drug rehab]   Risks of Transfer  Potential for delay in receiving treatment, Potential deterioration of medical condition, Increased discomfort during transfer, Possible worsening of condition or death during transfer   Accepting Physician  Dr Eren Queen Name, Galina Varela   Sending MD  pester   Provider Certification  General risk, such as traffic hazards, adverse weather conditions, rough terrain or turbulence, possible failure of equipment (including vehicle or aircraft), or consequences of actions of persons outside the control of the transport personnel, Unanticipated needs of medical equipment and personnel during transport, Risk of worsening condition      RN Documentation      [facility] [address] [place] [address] Name, Galina Varela      Follow-up Information    None         Patient's Medications   Discharge Prescriptions    No medications on file     No discharge procedures on file  ED Provider  Attending physically available and evaluated Kim Myke ZUNIGA managed the patient along with the ED Attending      Electronically Signed by         Kimberly Dunaway MD  01/30/19 0619

## 2019-01-29 NOTE — ED NOTES
Pt is a 33 y o  male who was brought to the ED with his sister due to her fear that he was going to overdose  Patient provided minimal information and allowed his sister to provide most history  Patient's sister relates that this morning their mother passed away, which was assumed to be from an overdose (she states that patient and their mother would do drugs together often)  Patient told his sister today that once he got his social security check that he would see how much he could buy with it  She became concerned because patient recently overdosed and has been in denial of their mother's death  She also expressed that patient's relationship with his mother was very co-dependent and bizarre at times  She states that today patient made several comments related to their mother still being alive, and since he was living with her his sister fears that he will not be able to cope with being home  Patient did state that he was feeling more depressed and is just trying to deal with it  He states several times that he needs to find his own apartment now  Patient is a very poor historian but states that he has been hospitalized for  and Dual programs several times  Patient receives outpatient treatment but it is unclear if he attends regularly or if he is taking his medications as prescribed  Patient's sister states that he uses various drugs daily, however patient denies this  Patient states that he does meth "or something" only sometimes  He reports his last use of 1/25 and uses via IV or snorting  Patient reports going days without sleep at times and reports to have been awake for the past two days  Patient denies any suicidal ideations and is vague about homicidal ideations  Patient's sister states that he makes comments about hating people all the time, however patient relates this to sometimes saying stuff he doesn't mean   Patient's sister also alludes that patient's behaviors are related to suicidal thoughts, however patient continues to deny this  Patient denies hallucinations, however his sister says that he often expresses that he is hearing things or thinks caterpillars are crawling on him  Patient is agreeable to sign himself in for treatment at this time  Throughout encounter patient was constantly looking around and would hesitate before answering things which would sometimes lead to him forgetting what was asked  Chief Complaint   Patient presents with    Psychiatric Evaluation     Pt sister states "our mom just  and I am afraid that he is gonna die cause he is using   I think he is using meth and heroin " Pt states "i'm not using both "     Intake Assessment completed, Safety risk Assessment completed    HALIMA Garcia  19   3661

## 2019-01-30 VITALS
DIASTOLIC BLOOD PRESSURE: 63 MMHG | HEART RATE: 112 BPM | RESPIRATION RATE: 16 BRPM | OXYGEN SATURATION: 99 % | SYSTOLIC BLOOD PRESSURE: 111 MMHG | TEMPERATURE: 97.5 F

## 2019-01-30 RX ORDER — LORAZEPAM 0.5 MG/1
1 TABLET ORAL ONCE
Status: COMPLETED | OUTPATIENT
Start: 2019-01-30 | End: 2019-01-30

## 2019-01-30 RX ORDER — LORAZEPAM 0.5 MG/1
1 TABLET ORAL ONCE
Status: DISCONTINUED | OUTPATIENT
Start: 2019-01-30 | End: 2019-01-30 | Stop reason: HOSPADM

## 2019-01-30 RX ORDER — CLONIDINE HYDROCHLORIDE 0.2 MG/1
0.2 TABLET ORAL ONCE
Status: COMPLETED | OUTPATIENT
Start: 2019-01-30 | End: 2019-01-30

## 2019-01-30 RX ORDER — NICOTINE 21 MG/24HR
21 PATCH, TRANSDERMAL 24 HOURS TRANSDERMAL ONCE
Status: DISCONTINUED | OUTPATIENT
Start: 2019-01-30 | End: 2019-01-30 | Stop reason: HOSPADM

## 2019-01-30 RX ORDER — ONDANSETRON 4 MG/1
4 TABLET, ORALLY DISINTEGRATING ORAL ONCE
Status: COMPLETED | OUTPATIENT
Start: 2019-01-30 | End: 2019-01-30

## 2019-01-30 RX ADMIN — FLUOXETINE HYDROCHLORIDE 20 MG: 20 CAPSULE ORAL at 13:25

## 2019-01-30 RX ADMIN — DIVALPROEX SODIUM 500 MG: 500 TABLET, DELAYED RELEASE ORAL at 06:10

## 2019-01-30 RX ADMIN — NICOTINE 21 MG: 21 PATCH, EXTENDED RELEASE TRANSDERMAL at 18:52

## 2019-01-30 RX ADMIN — ONDANSETRON 4 MG: 4 TABLET, ORALLY DISINTEGRATING ORAL at 08:49

## 2019-01-30 RX ADMIN — DOXEPIN HYDROCHLORIDE 10 MG: 10 CAPSULE ORAL at 00:20

## 2019-01-30 RX ADMIN — ALPRAZOLAM 2 MG: 0.5 TABLET ORAL at 00:20

## 2019-01-30 RX ADMIN — LORAZEPAM 1 MG: 0.5 TABLET ORAL at 07:30

## 2019-01-30 RX ADMIN — DIVALPROEX SODIUM 500 MG: 500 TABLET, DELAYED RELEASE ORAL at 01:12

## 2019-01-30 RX ADMIN — DIVALPROEX SODIUM 500 MG: 500 TABLET, DELAYED RELEASE ORAL at 14:16

## 2019-01-30 RX ADMIN — CLONIDINE HYDROCHLORIDE 0.2 MG: 0.2 TABLET ORAL at 07:30

## 2019-01-30 NOTE — EMTALA/ACUTE CARE TRANSFER
[address]  [facility]  Dept: [facility] [place] [address] CONSENT    NAME Kacy Her                                         1990                              MRN [id number]    I have been informed of my rights regarding examination, treatment, and transfer   by Dr Aramis Vila DO    Benefits: Specialized equipment and/or services available at the receiving facility (Include comment)________________________ (behavioral medicine, drug rehab)    Risks: Potential for delay in receiving treatment, Potential deterioration of medical condition, Increased discomfort during transfer, Possible worsening of condition or death during transfer      Consent for Transfer:  I acknowledge that my medical condition has been evaluated and explained to me by the emergency department physician or other qualified medical person and/or my attending physician, who has recommended that I be transferred to the service of  Accepting Physician: Dr Candie Carrasco at [facility] Name, [address] : [place]  The above potential benefits of such transfer, the potential risks associated with such transfer, and the probable risks of not being transferred have been explained to me, and I fully understand them  The doctor has explained that, in my case, the benefits of transfer outweigh the risks  I agree to be transferred  I authorize the performance of emergency medical procedures and treatments upon me in both transit and upon arrival at the receiving facility  Additionally, I authorize the release of any and all medical records to the receiving facility and request they be transported with me, if possible  I understand that the safest mode of transportation during a medical emergency is an ambulance and that the Hospital advocates the use of this mode of transport   Risks of traveling to the receiving facility by car, including absence of medical control, life sustaining equipment, such as oxygen, and medical personnel has been explained to me and I fully understand them  (BRIAN CORRECT BOX BELOW)  [  ]  I consent to the stated transfer and to be transported by ambulance/helicopter  [  ]  I consent to the stated transfer, but refuse transportation by ambulance and accept full responsibility for my transportation by car  I understand the risks of non-ambulance transfers and I exonerate the Hospital and its staff from any deterioration in my condition that results from this refusal     X___________________________________________    DATE  19  TIME________  Signature of patient or legally responsible individual signing on patient behalf           RELATIONSHIP TO PATIENT_________________________          Provider Certification    NAME Sallie Maciel                                        SIMONE 1990                              MRN [id number]    A medical screening exam was performed on the above named patient  Based on the examination:    Condition Necessitating Transfer The primary encounter diagnosis was Substance abuse ([facility] [address] )  A diagnosis of Suicidal ideation was also pertinent to this visit      Patient Condition: The patient has been stabilized such that within reasonable medical probability, no material deterioration of the patient condition or the condition of the unborn child(marika) is likely to result from the transfer    Reason for Transfer: Level of Care needed not available at this facility (dual behavioral/drug rehab)    Transfer Requirements: [facility]   · Space available and qualified personnel available for treatment as acknowledged by    · Agreed to accept transfer and to provide appropriate medical treatment as acknowledged by       Dr Gay Pinedo  · Appropriate medical records of the examination and treatment of the patient are provided at the time of transfer   [address] _______  · Transfer will be performed by qualified personnel from    and appropriate transfer equipment as required, including the use of necessary and appropriate life support measures  Provider Certification: I have examined the patient and explained the following risks and benefits of being transferred/refusing transfer to the patient/family:  General risk, such as traffic hazards, adverse weather conditions, rough terrain or turbulence, possible failure of equipment (including vehicle or aircraft), or consequences of actions of persons outside the control of the transport personnel, Unanticipated needs of medical equipment and personnel during transport, Risk of worsening condition      Based on these reasonable risks and benefits to the patient and/or the unborn child(marika), and based upon the information available at the time of the patients examination, I certify that the medical benefits reasonably to be expected from the provision of appropriate medical treatments at another medical facility outweigh the increasing risks, if any, to the individuals medical condition, and in the case of labor to the unborn child, from effecting the transfer      X____________________________________________ DATE 01/30/19        TIME_______      ORIGINAL - SEND TO MEDICAL RECORDS   COPY - SEND WITH PATIENT DURING TRANSFER

## 2019-01-30 NOTE — ED NOTES
Transport arranged with LESLYE p/u at [facility] from [facility] aware of p/u time and ETA

## 2019-01-30 NOTE — ED NOTES
Family went to cafe and bought more food for pt that he requested        Lakeshia Diaz, DENISHA  01/30/19 3196

## 2019-01-30 NOTE — ED NOTES
Family member observed video recording pt  Visitor told he could not record pt or staff  Visitor verbalized understanding        Horace Hare RN  01/30/19 8891

## 2019-01-30 NOTE — ED NOTES
Insurance Authorization:   Phone call placed to [facility]  Phone number: [phone]  Spoke to Kennedi Munoz  3 days approved  Level of care: Inpatient MH treatment (201)  Review on TBD  Authorization # to be obtained by accepting facility once arrived       HALIMA Guallpa  01/29/19   4849

## 2019-01-30 NOTE — ED NOTES
Pt is becoming more anxious  However, is redirectable and cooperative  Provided pt with water and crackers        Dayna Paz RN  01/29/19 4003

## 2019-01-30 NOTE — ED NOTES
Patient is accepted at [facility]  Patient is accepted by Dr Yang Huang per Ayush Ruby in admissions  Transportation is not available tonight due to the weather and will need to be scheduled at a later time      HALIMA Bourne  01/29/19 2014

## 2019-01-30 NOTE — ED NOTES
Chart faxed to [facility] for review for dual placement       HALIMA Bourne  01/29/19   4686

## 2019-02-14 ENCOUNTER — HOSPITAL ENCOUNTER (EMERGENCY)
Facility: HOSPITAL | Age: 29
Discharge: HOME | End: 2019-02-14
Attending: EMERGENCY MEDICINE | Admitting: EMERGENCY MEDICINE
Payer: COMMERCIAL

## 2019-02-14 VITALS
TEMPERATURE: 98.8 F | WEIGHT: 141.54 LBS | DIASTOLIC BLOOD PRESSURE: 80 MMHG | BODY MASS INDEX: 22.84 KG/M2 | RESPIRATION RATE: 16 BRPM | OXYGEN SATURATION: 97 % | HEART RATE: 96 BPM | SYSTOLIC BLOOD PRESSURE: 129 MMHG

## 2019-02-14 DIAGNOSIS — F32.A DEPRESSION: Primary | ICD-10-CM

## 2019-02-14 DIAGNOSIS — F19.10 SUBSTANCE ABUSE (HCC): ICD-10-CM

## 2019-02-14 DIAGNOSIS — F19.10 DRUG ABUSE (HCC): ICD-10-CM

## 2019-02-14 PROCEDURE — 99284 EMERGENCY DEPT VISIT MOD MDM: CPT

## 2019-02-14 RX ORDER — QUETIAPINE FUMARATE 200 MG/1
200 TABLET, FILM COATED ORAL
COMMUNITY

## 2019-02-14 RX ORDER — NICOTINE 21 MG/24HR
1 PATCH, TRANSDERMAL 24 HOURS TRANSDERMAL EVERY 24 HOURS
COMMUNITY

## 2019-02-14 NOTE — ED NOTES
Denies SI, HI,AH, VH; states doesn't know what to do; doesn't have money and needs a place to sleep; Awaiting crisis worker   Cooperative; calm, but states feels anxious and that makes stomach tight and chest      Tremaine Kohli, DENISHA  02/14/19 6601
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
## 2019-02-14 NOTE — ED NOTES
Call from Michelle Ramos from [facility]OST  She stated she reached [facility] and there is a bed on hold for the patient at their [facility] site  Patient is currently on the phone with their admissions department

## 2019-02-14 NOTE — ED NOTES
Voice mail from Mikki Palmer who stated [facility]; therefore, they will send a [facility]OST representative

## 2019-02-14 NOTE — ED PROVIDER NOTES
History  Chief Complaint   Patient presents with    Psychiatric Evaluation     pt states that he was discharged from a psych facility in [facility] earlier today and he wants to be checked back in for help  staets that he is depressed but denies SI/HI/AH/VH     HPI    Prior to Admission Medications   Prescriptions Last Dose Informant Patient Reported? Taking? FLUoxetine HCl (PROZAC PO)   Yes No   Sig: Take by mouth   QUEtiapine (SEROquel) 200 mg tablet   Yes Yes   Sig: Take 200 mg by mouth daily at bedtime   divalproex sodium (DEPAKOTE) 500 mg EC tablet   Yes No   Sig: Take by mouth every 8 (eight) hours   nicotine (NICODERM CQ) 21 mg/24 hr TD 24 hr patch   Yes Yes   Sig: Place 1 patch on the skin every 24 hours      Facility-Administered Medications: None       Past Medical History:   Diagnosis Date    Anxiety     Bipolar disorder ([facility] [address] )     Depression     Intermittent explosive disorder     Psychiatric disorder     Schizo-affective schizophrenia ([facility] )        Past Surgical History:   Procedure Laterality Date    LEG SURGERY         History reviewed  No pertinent family history  I have reviewed and agree with the history as documented      Social History     Tobacco Use    Smoking status: Current Some Day Smoker     Packs/day: 1 00     Years: 12 00     Pack years: 12 00     Types: Cigarettes    Smokeless tobacco: Never Used    Tobacco comment: " i don't know how many"   Substance Use Topics    Alcohol use: No     Comment: pt denies    Drug use: Yes     Types: Heroin, Marijuana, Methamphetamines, Prescription     Comment: last use a few weeks ago        Review of Systems    Physical Exam  Physical Exam    Vital Signs  ED Triage Vitals   Temperature Pulse Respirations Blood Pressure SpO2   02/14/19 1320 02/14/19 1320 02/14/19 1320 02/14/19 1320 02/14/19 1320   98 8 °F (37 1 °C) 96 14 119/91 98 %      Temp Source Heart Rate Source Patient Position - Orthostatic VS BP Location FiO2 (%)   02/14/19 1320 02/14/19 1320 02/14/19 1320 02/14/19 1320 --   Tympanic Monitor Sitting Left arm       Pain Score       02/14/19 1900       No Pain           Vitals:    02/14/19 1320 02/14/19 1900   BP: 119/91 129/80   Pulse: 96 96   Patient Position - Orthostatic VS: Sitting        Visual Acuity      ED Medications  Medications - No data to display    Diagnostic Studies  Results Reviewed     None                 No orders to display              Procedures  Procedures       Phone Contacts  ED Phone Contact    ED Course  ED Course as of Feb 14 2303   u Feb 14, 2019   1851 PSS had patient speak with admissions for facility  Did not admit to anything on the phone  This point will discharge patient  MDM    Disposition  Final diagnoses:   Depression   Drug abuse ([facility] [address] )   Substance abuse ([facility] [address] )     Time reflects when diagnosis was documented in both MDM as applicable and the Disposition within this note     Time User Action Codes Description Comment    2/14/2019  4:35 PM Cheral Mo Add [F32 9] Depression     2/14/2019  4:36 PM Cheral Mo Add [F19 10] Drug abuse ([facility] [address] )     2/14/2019  6:52 PM Alonna Bong Add [F19 10] Substance abuse [facility])       ED Disposition     ED Disposition Condition Date/Time Comment    Discharge Stable u Feb 14, 2019  6:52 PM Lacinda [facility] discharge to home/self care              MD Documentation      Most Recent Value   Sending MD Javad Woody MD      Follow-up Information     Follow up With Specialties Details Why Contact Info    Unknown MD [facility]   [place] [id number] [phone]            Discharge Medication List as of 2/14/2019  6:52 PM      CONTINUE these medications which have NOT CHANGED    Details   nicotine (NICODERM CQ) 21 mg/24 hr TD 24 hr patch Place 1 patch on the skin every 24 hours, Historical Med      QUEtiapine (SEROquel) 200 mg tablet Take 200 mg by mouth daily at bedtime, Historical Med      divalproex sodium (DEPAKOTE) 500 mg EC tablet Take by mouth every 8 (eight) hours, Historical Med      FLUoxetine HCl (PROZAC PO) Take by mouth, Historical Med           No discharge procedures on file      ED Provider  Electronically Signed by           Lakeisha Juarez MD  02/14/19 8118

## 2019-02-14 NOTE — ED NOTES
Pt resting comfortably on stretcher at this time  Aware awaiting [facility] evaluation  Pt had no needs at this time, aware to let staff know if any needs or concerns arise and pt verbalized understanding  Safety measures maintained and will continue to monitor pt        Jorge Macdonald RN  02/14/19 6774

## 2019-02-14 NOTE — ED NOTES
Patient was referred to [facility]  Spoke with Valencia Russ, [phone], who stated she will be in to assess patient shortly

## 2019-02-14 NOTE — ED CARE HANDOFF
Emergency Department Sign Out Note        Sign out and transfer of care from Dr Tabby Perez  See Separate Emergency Department note  The patient, Joe Parada, was evaluated by the previous provider for substance abuse  Workup Completed:  Medical clearance    ED Course / Workup Pending (followup): Waiting on HOST eval                              Procedures  MDM    Disposition  Final diagnoses:   Depression   Drug abuse ([facility] [address] )     Time reflects when diagnosis was documented in both MDM as applicable and the Disposition within this note     Time User Action Codes Description Comment    2/14/2019  4:35 PM Melissa Gaytan Add [F32 9] Depression     2/14/2019  4:36 PM Melissa Gaytan Add [F19 10] Drug abuse [facility])       ED Disposition     None      Follow-up Information    None       Patient's Medications   Discharge Prescriptions    No medications on file     No discharge procedures on file         ED Provider  Electronically Signed by     Sandra Ling MD  02/14/19 3567
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
## 2019-02-14 NOTE — ED PROVIDER NOTES
History  Chief Complaint   Patient presents with    Psychiatric Evaluation     pt states that he was discharged from a psych facility in [facility] earlier today and he wants to be checked back in for help  staets that he is depressed but denies SI/HI/AH/VH     HPI    Prior to Admission Medications   Prescriptions Last Dose Informant Patient Reported? Taking? FLUoxetine HCl (PROZAC PO)   Yes No   Sig: Take by mouth   QUEtiapine (SEROquel) 200 mg tablet   Yes Yes   Sig: Take 200 mg by mouth daily at bedtime   divalproex sodium (DEPAKOTE) 500 mg EC tablet   Yes No   Sig: Take by mouth every 8 (eight) hours   nicotine (NICODERM CQ) 21 mg/24 hr TD 24 hr patch   Yes Yes   Sig: Place 1 patch on the skin every 24 hours      Facility-Administered Medications: None       Past Medical History:   Diagnosis Date    Anxiety     Bipolar disorder ([facility] [address] )     Depression     Intermittent explosive disorder     Psychiatric disorder     Schizo-affective schizophrenia ([facility] )        Past Surgical History:   Procedure Laterality Date    LEG SURGERY         History reviewed  No pertinent family history  I have reviewed and agree with the history as documented      Social History     Tobacco Use    Smoking status: Current Some Day Smoker     Packs/day: 1 00     Years: 12 00     Pack years: 12 00     Types: Cigarettes    Smokeless tobacco: Never Used    Tobacco comment: " i don't know how many"   Substance Use Topics    Alcohol use: No     Comment: pt denies    Drug use: Yes     Types: Heroin, Marijuana, Methamphetamines, Prescription     Comment: last use a few weeks ago        Review of Systems    Physical Exam  Physical Exam    Vital Signs  ED Triage Vitals [02/14/19 1320]   Temperature Pulse Respirations Blood Pressure SpO2   98 8 °F (37 1 °C) 96 14 119/91 98 %      Temp Source Heart Rate Source Patient Position - Orthostatic VS BP Location FiO2 (%)   Tympanic Monitor Sitting Left arm --      Pain Score       -- Vitals:    02/14/19 1320   BP: 119/91   Pulse: 96   Patient Position - Orthostatic VS: Sitting       Visual Acuity      ED Medications  Medications - No data to display    Diagnostic Studies  Results Reviewed     None                 No orders to display              Procedures  Procedures       Phone Contacts  ED Phone Contact    ED Course                               MDM    Disposition  Final diagnoses:   None     ED Disposition     None      Follow-up Information    None         Patient's Medications   Discharge Prescriptions    No medications on file     No discharge procedures on file      ED Provider  Electronically Signed by           Arianna Qureshi DO  02/16/19 4818

## 2019-02-14 NOTE — ED NOTES
Call from Luis Carlos Mendoza from [facility]  Since the patient has an intake set up with [facility], she will ask them to consider placing the patient  If they are unable to do so, HOST will them come after 16:00 to assess the patient
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
## 2019-02-15 ENCOUNTER — HOSPITAL ENCOUNTER (EMERGENCY)
Facility: HOSPITAL | Age: 29
Discharge: HOME | End: 2019-02-15
Attending: EMERGENCY MEDICINE | Admitting: EMERGENCY MEDICINE
Payer: COMMERCIAL

## 2019-02-15 VITALS
OXYGEN SATURATION: 98 % | DIASTOLIC BLOOD PRESSURE: 70 MMHG | HEART RATE: 95 BPM | SYSTOLIC BLOOD PRESSURE: 118 MMHG | BODY MASS INDEX: 20.89 KG/M2 | RESPIRATION RATE: 16 BRPM | HEIGHT: 66 IN | TEMPERATURE: 97.8 F | WEIGHT: 130 LBS

## 2019-02-15 DIAGNOSIS — F32.A DEPRESSION: Primary | ICD-10-CM

## 2019-02-15 LAB
AMPHETAMINES SERPL QL SCN: NEGATIVE
BARBITURATES UR QL: NEGATIVE
BENZODIAZ UR QL: NEGATIVE
COCAINE UR QL: NEGATIVE
ETHANOL EXG-MCNC: 0 MG/DL
METHADONE UR QL: NEGATIVE
OPIATES UR QL SCN: NEGATIVE
PCP UR QL: NEGATIVE
THC UR QL: NEGATIVE

## 2019-02-15 PROCEDURE — 80307 DRUG TEST PRSMV CHEM ANLYZR: CPT | Performed by: EMERGENCY MEDICINE

## 2019-02-15 PROCEDURE — 82075 ASSAY OF BREATH ETHANOL: CPT | Performed by: EMERGENCY MEDICINE

## 2019-02-15 PROCEDURE — 99285 EMERGENCY DEPT VISIT HI MDM: CPT

## 2019-02-15 NOTE — ED NOTES
Shoelaces, toothbrush, lotion, blue scrubs and socks, paperwork all in brown grocery bag  Sneakers, two sweatshirts, jeans, belt  All items brought to zone 5 med room      Ethan Broussard  02/15/19 0401       Julia Asencio  02/15/19 5729

## 2019-02-15 NOTE — ED ATTENDING ATTESTATION
I, [address] [place] [address], DO, saw and evaluated the patient  I have discussed the patient with the resident/non-physician practitioner and agree with the resident's/non-physician practitioner's findings, Plan of Care, and MDM as documented in the resident's/non-physician practitioner's note, except where noted  All available labs and Radiology studies were reviewed  At this point I agree with the current assessment done in the Emergency Department  I have conducted an independent evaluation of this patient a history and physical is as follows:    81-year-old male presents with depression and anxiety  Patient was just discharged from psychiatric facility and then was seen at [facility] yesterday  Patient states he tried to go to his sister's house but they would not let him in  Patient denies suicidal, homicidal ideations  States he is not really hearing voices  Denies physical complaints  On exam-no acute distress, heart regular, no respiratory distress, moves all extremities    Plan-crisis to evaluate    Critical Care Time  Procedures

## 2019-02-15 NOTE — ED NOTES
Pt aware discharge home, given instructions at this time and verbalizes return instructions        Christiano Rees RN  02/14/19 2894

## 2019-02-15 NOTE — ED NOTES
Pt provided with breakfast menu  Pt did not want to order at this time        Andrae Johnson RN  02/15/19 0148

## 2019-02-15 NOTE — ED PROVIDER NOTES
History  Chief Complaint   Patient presents with    Depression     pt states he felt like no one wants him around, reports increased depression and anxiety  Pt tried to go to sister's house and shelter after being discharged from [facility]  Pt denies SI/HI    Anxiety     33year old male with history of schizoaffective disorder, bipolar disorder, polysubstance abuse presents to ED for depression and anxiety  Patient was recently discharged from [facility] for inpatient psych treatment  Since being discharged he has not had a stable place to stay  He says his sister has kicked him out of her home and he was denied at a shelter  He states that his depression has been worsening but no SI/HI  When asked if he hears voices patient laughs and states "not right now, but I don't want to talk about it"  Denies any active drug/alcohol use  No physical complaints  Prior to Admission Medications   Prescriptions Last Dose Informant Patient Reported? Taking? FLUoxetine HCl (PROZAC PO) 2/15/2019 at Unknown time  Yes Yes   Sig: Take by mouth   QUEtiapine (SEROquel) 200 mg tablet 2/14/2019 at Unknown time  Yes Yes   Sig: Take 200 mg by mouth daily at bedtime   divalproex sodium (DEPAKOTE) 500 mg EC tablet 2/15/2019 at Unknown time  Yes Yes   Sig: Take by mouth every 8 (eight) hours   nicotine (NICODERM CQ) 21 mg/24 hr TD 24 hr patch 2/15/2019 at Unknown time  Yes Yes   Sig: Place 1 patch on the skin every 24 hours      Facility-Administered Medications: None       Past Medical History:   Diagnosis Date    Anxiety     Bipolar disorder ([facility] [address] )     Depression     Intermittent explosive disorder     Psychiatric disorder     Schizo-affective schizophrenia [facility])        Past Surgical History:   Procedure Laterality Date    LEG SURGERY         No family history on file  I have reviewed and agree with the history as documented      Social History     Tobacco Use    Smoking status: Current Some Day Smoker Packs/day: 1 00     Years: 12 00     Pack years: 12 00     Types: Cigarettes    Smokeless tobacco: Never Used    Tobacco comment: " i don't know how many"   Substance Use Topics    Alcohol use: No     Comment: pt denies    Drug use: Yes     Types: Heroin, Marijuana, Methamphetamines, Prescription     Comment: last use a few weeks ago        Review of Systems   Constitutional: Negative  Negative for chills and fever  HENT: Negative  Negative for congestion and rhinorrhea  Eyes: Negative  Respiratory: Negative  Negative for cough and shortness of breath  Cardiovascular: Negative  Negative for chest pain and leg swelling  Gastrointestinal: Negative  Negative for abdominal distention, abdominal pain, diarrhea, nausea and vomiting  Musculoskeletal: Negative  Negative for back pain and neck pain  Skin: Negative  Negative for rash  Neurological: Negative  Negative for light-headedness and headaches  Hematological: Negative  Psychiatric/Behavioral: Positive for dysphoric mood  All other systems reviewed and are negative  Physical Exam  ED Triage Vitals   Temperature Pulse Respirations Blood Pressure SpO2   02/15/19 0343 02/15/19 0341 02/15/19 0341 02/15/19 0341 02/15/19 0348   97 8 °F (36 6 °C) 95 16 118/70 98 %      Temp src Heart Rate Source Patient Position - Orthostatic VS BP Location FiO2 (%)   -- 02/15/19 0341 -- -- --    Monitor         Pain Score       02/15/19 0341       No Pain           Orthostatic Vital Signs  Vitals:    02/15/19 0341   BP: 118/70   Pulse: 95       Physical Exam   Constitutional: He is oriented to person, place, and time  He appears well-developed and well-nourished  No distress  HENT:   Head: Normocephalic and atraumatic  Mouth/Throat: Oropharynx is clear and moist    Eyes: EOM are normal    Neck: Normal range of motion  Neck supple  Cardiovascular: Normal rate, regular rhythm, normal heart sounds and intact distal pulses   Exam reveals no gallop and no friction rub  No murmur heard  Pulmonary/Chest: Effort normal and breath sounds normal  No respiratory distress  He has no wheezes  He has no rales  Abdominal: Soft  There is no tenderness  There is no rebound and no guarding  Musculoskeletal: He exhibits no edema or tenderness  Neurological: He is alert and oriented to person, place, and time  Clear fluent speech   Skin: Skin is warm and dry  Capillary refill takes less than 2 seconds  Psychiatric: He expresses no homicidal and no suicidal ideation  He expresses no suicidal plans and no homicidal plans  Poor eye contact  Linear thought process but easily distracted  Nursing note and vitals reviewed  ED Medications  Medications - No data to display    Diagnostic Studies  Results Reviewed     Procedure Component Value Units Date/Time    Rapid drug screen, urine [[id number]]  (Normal) Collected:  02/15/19 0406    Lab Status:  Final result Specimen:  Urine, Clean Catch Updated:  02/15/19 0542     Amph/Meth UR Negative     Barbiturate Ur Negative     Benzodiazepine Urine Negative     Cocaine Urine Negative     Methadone Urine Negative     Opiate Urine Negative     PCP Ur Negative     THC Urine Negative    Narrative:       FOR MEDICAL PURPOSES ONLY  IF CONFIRMATION NEEDED PLEASE CONTACT THE LAB WITHIN 5 DAYS  Drug Screen Cutoff Levels:  AMPHETAMINE/METHAMPHETAMINES  1000 ng/mL  BARBITURATES     200 ng/mL  BENZODIAZEPINES     200 ng/mL  COCAINE      300 ng/mL  METHADONE      300 ng/mL  OPIATES      300 ng/mL  PHENCYCLIDINE     25 ng/mL  THC       50 ng/mL    POCT alcohol breath test [[id number]]  (Normal) Resulted:  02/15/19 0402    Lab Status:  Edited Result - FINAL Updated:  02/15/19 0402     EXTBreath Alcohol 0 000                 No orders to display         Procedures  Procedures      Phone Consults  ED Phone Contact    ED Course  ED Course as of Feb 15 0647   Fri Feb 15, 2019   0444 Crisis worker saw patient    He does not appear to be acutely suicidal or week inpatient psych criteria  He has an appointment with outpatient psych but does not remember when it is  Will have him stay in the ED until he can call to verify his appointment  MDM  Number of Diagnoses or Management Options  Depression:   Diagnosis management comments: 33year old male with history of schizoaffective disorder, bipolar disorder, polysubstance abuse presents to ED for depression and anxiety  Will obtain UDS and BAT and have him speak with crisis  Final assessment:  Crisis spoke with patient and he does not have any suicidal or homicidal ideation or active psychosis to indicate need for inpatient psych admission  Patient has an appointment with outpatient psych resources and says that he will call them today to follow-up  No other issues or complaints and he remained stable for discharge  Disposition  Final diagnoses:   Depression     Time reflects when diagnosis was documented in both MDM as applicable and the Disposition within this note     Time User Action Codes Description Comment    2/15/2019  6:41 AM Suhail Levine Add [F32 9] Depression       ED Disposition     ED Disposition Condition Date/Time Comment    Discharge Good Fri Feb 15, 2019  6:41 AM Greyson Rivera discharge to home/self care              MD Documentation      Most Recent Value   Sending MD Dr Marquette Sandifer up With Specialties Details Why Contact Info Additional Information    Palma Cueto MD Family Medicine Call in 1 day To make an appointment [address] [address]  [phone]       [address] [place] [address] Emergency Department Emergency Medicine Go to  If symptoms worsen [address] [facility] [address] [facility] ED, [address] [place] [address], [id number]          Patient's Medications   Discharge Prescriptions    No medications on file     No discharge procedures on file  ED Provider  Attending physically available and evaluated Linda Creamgualberto ZUNIGA managed the patient along with the ED Attending      Electronically Signed by         Carlos Whitney MD  02/15/19 3278

## 2019-02-15 NOTE — ED NOTES
Patient was provided with homeless shelter list as well as outpatient D&A resource list  Patient was also provided a flyer for [facility] outpatient walk-in assessment hours  Patient was informed that once discharged he should go to [facility] to discuss treatment and from there can contact shelters about bed availability  Patient has no other complaints at this time and remains agreeable with this plan       HALIMA King  02/15/19   8818

## 2019-02-15 NOTE — ED NOTES
Pt is a 33 y o  male who was brought to the ED with increased depression and anxiety  Patient states that he discharged from [facility] yesterday because he thought his family would want him home  At the time of his discharge, he declined further rehab treatment that was being offered  Patient states that when he got to his sister's house, they got into an argument and she told him that he couldn't stay there  Patient is requesting inpatient treatment again because it is what is sister is telling him he needs  Patient was informed that he is not meeting criteria for inpatient treatment at this time  Patient relates feeling depressed and anxious given his homelessness and not knowing where his dog is  Patient denies suicidal/homicidal ideations and auditory/visual hallucinations  Patient states that he has not been using any drug since prior to his hospitalization at [facility]  Patient states that today he spoke with Geoff while at [facility] ED, however withheld information which then deemed him inappropriate for inpatient treatment  Patient states that [facility] scheduled outpatient D&A appointments, however he cannot recall when they were for  Patient states that he doesn't know what to do in order to find out about those appointments  Patient also states that he went to a shelter today but there were no beds, however when asked what shelter he went to he retracted his statement and said that he didn't know what time he could go and therefore did not  Patient is agreeable to following up with outpatient treatment and going to a local shelter, however states he needs assistance in figuring out how to do these things  Chief Complaint   Patient presents with    Depression     pt states he felt like no one wants him around, reports increased depression and anxiety   Pt tried to go to sister's house and shelter after being discharged from [facility]  Pt denies SI/HI    Anxiety     Intake Assessment completed, Safety risk Assessment completed    Christelle Chang, HALIMA  02/15/19   1437

## 2019-02-19 ENCOUNTER — HOSPITAL ENCOUNTER (EMERGENCY)
Facility: HOSPITAL | Age: 29
End: 2019-02-19
Attending: EMERGENCY MEDICINE | Admitting: EMERGENCY MEDICINE
Payer: COMMERCIAL

## 2019-02-19 VITALS
SYSTOLIC BLOOD PRESSURE: 118 MMHG | DIASTOLIC BLOOD PRESSURE: 96 MMHG | TEMPERATURE: 97.7 F | HEART RATE: 64 BPM | RESPIRATION RATE: 16 BRPM | OXYGEN SATURATION: 98 %

## 2019-02-19 DIAGNOSIS — F32.A DEPRESSION: Primary | ICD-10-CM

## 2019-02-19 DIAGNOSIS — R45.851 SUICIDAL IDEATIONS: ICD-10-CM

## 2019-02-19 LAB
ALBUMIN SERPL BCP-MCNC: 5.2 G/DL (ref 3–5.2)
ALP SERPL-CCNC: 81 U/L (ref 43–122)
ALT SERPL W P-5'-P-CCNC: 38 U/L (ref 9–52)
AMPHETAMINES SERPL QL SCN: POSITIVE
ANION GAP SERPL CALCULATED.3IONS-SCNC: 12 MMOL/L (ref 5–14)
AST SERPL W P-5'-P-CCNC: 63 U/L (ref 17–59)
ATRIAL RATE: 98 BPM
BARBITURATES UR QL: NEGATIVE
BASOPHILS # BLD AUTO: 0.1 THOUSANDS/ΜL (ref 0–0.1)
BASOPHILS NFR BLD AUTO: 1 % (ref 0–1)
BENZODIAZ UR QL: NEGATIVE
BILIRUB SERPL-MCNC: 0.5 MG/DL
BUN SERPL-MCNC: 23 MG/DL (ref 5–25)
CALCIUM SERPL-MCNC: 10.2 MG/DL (ref 8.4–10.2)
CHLORIDE SERPL-SCNC: 102 MMOL/L (ref 97–108)
CO2 SERPL-SCNC: 27 MMOL/L (ref 22–30)
COCAINE UR QL: NEGATIVE
CREAT SERPL-MCNC: 0.68 MG/DL (ref 0.7–1.5)
EOSINOPHIL # BLD AUTO: 0.3 THOUSAND/ΜL (ref 0–0.4)
EOSINOPHIL NFR BLD AUTO: 2 % (ref 0–6)
ERYTHROCYTE [DISTWIDTH] IN BLOOD BY AUTOMATED COUNT: 15.4 %
ETHANOL SERPL-MCNC: <10 MG/DL (ref 0–10)
GFR SERPL CREATININE-BSD FRML MDRD: 131 ML/MIN/1.73SQ M
GLUCOSE SERPL-MCNC: 105 MG/DL (ref 70–99)
HCT VFR BLD AUTO: 42.6 % (ref 41–53)
HGB BLD-MCNC: 13.9 G/DL (ref 13.5–17.5)
LYMPHOCYTES # BLD AUTO: 2.9 THOUSANDS/ΜL (ref 0.5–4)
LYMPHOCYTES NFR BLD AUTO: 20 % (ref 25–45)
MCH RBC QN AUTO: 30.3 PG (ref 26–34)
MCHC RBC AUTO-ENTMCNC: 32.7 G/DL (ref 31–36)
MCV RBC AUTO: 93 FL (ref 80–100)
METHADONE UR QL: NEGATIVE
MONOCYTES # BLD AUTO: 1.3 THOUSAND/ΜL (ref 0.2–0.9)
MONOCYTES NFR BLD AUTO: 9 % (ref 1–10)
NEUTROPHILS # BLD AUTO: 10.1 THOUSANDS/ΜL (ref 1.8–7.8)
NEUTS SEG NFR BLD AUTO: 69 % (ref 45–65)
OPIATES UR QL SCN: NEGATIVE
P AXIS: 71 DEGREES
PCP UR QL: NEGATIVE
PLATELET # BLD AUTO: 399 THOUSANDS/UL (ref 150–450)
PMV BLD AUTO: 7.7 FL (ref 8.9–12.7)
POTASSIUM SERPL-SCNC: 3.8 MMOL/L (ref 3.6–5)
PR INTERVAL: 126 MS
PROT SERPL-MCNC: 9 G/DL (ref 5.9–8.4)
QRS AXIS: 64 DEGREES
QRSD INTERVAL: 72 MS
QT INTERVAL: 344 MS
QTC INTERVAL: 439 MS
RBC # BLD AUTO: 4.59 MILLION/UL (ref 4.5–5.9)
SODIUM SERPL-SCNC: 141 MMOL/L (ref 137–147)
T WAVE AXIS: 46 DEGREES
THC UR QL: POSITIVE
VENTRICULAR RATE: 98 BPM
WBC # BLD AUTO: 14.7 THOUSAND/UL (ref 4.5–11)

## 2019-02-19 PROCEDURE — 93010 ELECTROCARDIOGRAM REPORT: CPT | Performed by: INTERNAL MEDICINE

## 2019-02-19 PROCEDURE — 85025 COMPLETE CBC W/AUTO DIFF WBC: CPT | Performed by: EMERGENCY MEDICINE

## 2019-02-19 PROCEDURE — 80053 COMPREHEN METABOLIC PANEL: CPT | Performed by: EMERGENCY MEDICINE

## 2019-02-19 PROCEDURE — 36415 COLL VENOUS BLD VENIPUNCTURE: CPT | Performed by: EMERGENCY MEDICINE

## 2019-02-19 PROCEDURE — 80307 DRUG TEST PRSMV CHEM ANLYZR: CPT | Performed by: EMERGENCY MEDICINE

## 2019-02-19 PROCEDURE — 99285 EMERGENCY DEPT VISIT HI MDM: CPT

## 2019-02-19 PROCEDURE — 93005 ELECTROCARDIOGRAM TRACING: CPT

## 2019-02-19 PROCEDURE — 80320 DRUG SCREEN QUANTALCOHOLS: CPT | Performed by: EMERGENCY MEDICINE

## 2019-02-19 NOTE — ED NOTES
Insurance Authorization:   Phone call placed to [employer]   Phone number: [phone]  Spoke to [employer]       3 days approved  Level of care: inpatient mental health   Review on Thurs 2/21  Authorization # **    To be given on admission

## 2019-02-19 NOTE — ED NOTES
Pt  Awake in bed, snack given  Pt is cooperative, has no complaints no issues at this time       Gini Joshi, RN  02/19/19 6044

## 2019-02-19 NOTE — ED NOTES
Pt in bed sleeping at this time  No signs of distress or discomfort       Jordin Ramirez RN  02/19/19 9750

## 2019-02-19 NOTE — LETTER
Section I - General Information    Name of Patient: Neil Martinez                 : 1990    Medicare #:____________n/a________  Transport Date: 19 (PCS is valid for round trips on this date and for all repetitive trips in the 60-day range as noted below )  Origin: [address] [facility]                                                         Destination:______[facility] __________________________________________  Is the pt's stay covered under Medicare Part A (PPS/DRG)     (_) YES  xx(_) NO  Closest appropriate facility?  (_) YES  (x_) NO  If no, why is transport to more distant facility required?_____lack of facility with a dual bed availability ___________________  If hosp-hosp transfer, describe services needed at 2nd facility not available at 1st facility? _________________________________  If hospice pt, is this transport related to pt's terminal illness? (_) YES (_) NO Describe____________________________________    Section II - Medical Necessity Questionnaire  Ambulance transportation is medically necessary only if other means of transport are contraindicated or would be potentially harmful to the patient  To meet this requirement, the patient must either be "bed confined" or suffer from a condition such that transport by means other than ambulance is contraindicated by the patient's condition   The following questions must be answered by the medical professional signing below for this form to be valid:    1)  Describe the MEDICAL CONDITION (physical and/or mental) of this patient AT [address] [place] [address] that requires the patient to be transported in an ambulance and why transport by other means is contraindicated by the patient's condition:____schizoaffective disorder ______________________________________________________________________________________________    2) Is the patient "bed confined" as defined below?     (_) YES  (_x) NO  To be "be confined" the patient must satisfy all three of the following conditions: (1) unable to get up from bed without Assistance; AND (2) unable to ambulate; AND (3) unable to sit in a chair or wheelchair  3) Can this patient safely be transported by car or wheelchair Daivd Buster (i e , seated during transport without a medical attendant or monitoring)?   (_) YES  (_) NO    4) In addition to completing questions 1-3 above, please check any of the following conditions that apply*:  *Note: supporting documentation for any boxes checked must be maintained in the patient's medical records  (_)Contractures   (_)Non-Healed Fractures  (_)Patient is confused (_)Patient is comatose (_)Moderate/severe pain on movement (_)Danger to self/others  (_)IV meds/fluids required (_)Patient is combative(_)Need or possible need for restraints (_)DVT requires elevation of lower extremity  (_)Medical attendant required (_)Requires oxygen-unable to self administer (_)Special handling/isolation/infection control precautions required (_)Unable to tolerate seated position for time needed to transport (_)Hemodynamic monitoring required en route (_)Unable to sit in a chair or wheelchair due to decubitus ulcers or other wounds (_)Cardiac monitoring required en route (_)Morbid obesity requires additional personnel/equipment to safely handle patient (_)Orthopedic device (backboard, halo, pins, traction, brace, wedge, etc,) requiring special handling during transport (x_)Other(specify)_________patient is on a mental health commitment for schizoaffective disorder______________________________________    Section III - Signature of Physician or Healthcare Professional  I certify that the above information is true and correct based on my evaluation of this patient, and represent that the patient requires transport by ambulance and that other forms of transport are contraindicated   I understand that this information will be used by the [facility] for Medicare and Medicaid Services (CMS) to support the determination of medical necessity for ambulance services, and I represent that I have personal knowledge of the patient's condition at time of transport  (_) If this box is checked, I also certify that the patient is physically or mentally incapable of signing the ambulance service's claim and that the institution with which I am affiliated has furnished care, services, or assistance to the patient  My signature below is made on behalf of the patient pursuant to 42 CFR §424 36(b)(4)  In accordance with 42 CFR §424 37, the specific reason(s) that the patient is physically or mentally incapable of signing the claim form is as follows: _________________________________________________________________________________________________________      Signature of Physician* or Healthcare Professional______________________________________________________________  Signature Date 02/19/19 (For scheduled repetitive transports, this form is not valid for transports performed more than 60 days after this date)    Printed Name & Credentials of Physician or Healthcare Professional (MD, DO, RN, etc )________________________________  *Form must be signed by patient's attending physician for scheduled, repetitive transports   For non-repetitive, unscheduled ambulance transports, if unable to obtain the signature of the attending physician, any of the following may sign (choose appropriate option below)  (_) Physician Assistant (_)  Clinical Nurse Specialist (_)  Registered Nurse  (_)  Nurse Practitioner  (_) Discharge Planner

## 2019-02-19 NOTE — LETTER
[facility] EMERGENCY DEPARTMENT  [address] [facility] [address]  [phone]  Dept: [phone]      EMTALA TRANSFER CONSENT    NAME Anel Sandhu                                         1990                              MRN [id number]    I have been informed of my rights regarding examination, treatment, and transfer   by Dr Cody Morgan MD    Benefits:  admission to a mental health inpatient unit    Risks:    Delay in treatment    { ED EMTALA TRANSFER CHOICES:[id number]}    I authorize the performance of emergency medical procedures and treatments upon me in both transit and upon arrival at the receiving facility  Additionally, I authorize the release of any and all medical records to the receiving facility and request they be transported with me, if possible  I understand that the safest mode of transportation during a medical emergency is an ambulance and that the Hospital advocates the use of this mode of transport  Risks of traveling to the receiving facility by car, including absence of medical control, life sustaining equipment, such as oxygen, and medical personnel has been explained to me and I fully understand them  (BRIAN CORRECT BOX BELOW)  [ x ]  I consent to the stated transfer and to be transported by ambulance/helicopter  [  ]  I consent to the stated transfer, but refuse transportation by ambulance and accept full responsibility for my transportation by car    I understand the risks of non-ambulance transfers and I exonerate the Hospital and its staff from any deterioration in my condition that results from this refusal     X___________________________________________    DATE  19  TIME_______  Signature of patient or legally responsible individual signing on patient behalf           RELATIONSHIP TO PATIENT_______self__________________          Provider Certification    NAME Anel Sandhu  1990                              MRN [id number]    A medical screening exam was performed on the above named patient  Based on the examination:    Condition Necessitating Transfer The primary encounter diagnosis was Depression  A diagnosis of Suicidal ideations was also pertinent to this visit  Patient Condition:  schizoaffective bipolar disorder     Reason for Transfer:  in need of mental health dual treatment    Transfer Requirements: Facility     · Space available and qualified personnel available for treatment as acknowledged by  ap  · Agreed to accept transfer and to provide appropriate medical treatment as acknowledged by        ap  · Appropriate medical records of the examination and treatment of the patient are provided at the time of transfer   [address], [address] ap_______  · Transfer will be performed by qualified personnel from  [employer] ambulance  and appropriate transfer equipment as required, including the use of necessary and appropriate life support measures  Provider Certification: I have examined the patient and explained the following risks and benefits of being transferred/refusing transfer to the patient/family:         Based on these reasonable risks and benefits to the patient and/or the unborn child(marika), and based upon the information available at the time of the patients examination, I certify that the medical benefits reasonably to be expected from the provision of appropriate medical treatments at another medical facility outweigh the increasing risks, if any, to the individuals medical condition, and in the case of labor to the unborn child, from effecting the transfer      X____________________________________________ DATE 19        TIME_______      ORIGINAL - SEND TO MEDICAL RECORDS   COPY - SEND WITH PATIENT DURING TRANSFER

## 2019-02-19 NOTE — ED NOTES
Patient is accepted at [facility]   Patient is accepted by Dr Kuldeep Cornejo  Transportation is arranged with [facility]  Transportation is scheduled for 1930  Notified sister April of transfer this evening    Transfer forms signed and completed   Patient is being transferred due to family/patient request

## 2019-02-19 NOTE — ED PROVIDER NOTES
History  Chief Complaint   Patient presents with    Psychiatric Evaluation     [employer] brought pt in for psych eval after being called by sister  pt states taht he is depressed but he denies SI/HI/AH/VH  This is a 81-year-old male presents emergency department for psychiatric evaluation  The patient was brought by his sister  Apparently they went to sign in and the patient then ran  He was brought in again by police  This sister is requesting to 36 the patient  Patient has been using meth  Mother recently passed away and patient has been increasingly depressed  Made statements about killing himself to his sister  Patient admits to drug use and depression  Exacerbated by mother's death  No fevers, chills  No chest pain  No sob  Symptoms moderate in severity  Recent hospitalization at [facility]  Prior to Admission Medications   Prescriptions Last Dose Informant Patient Reported? Taking? FLUoxetine HCl (PROZAC PO)   Yes No   Sig: Take by mouth   QUEtiapine (SEROquel) 200 mg tablet   Yes No   Sig: Take 200 mg by mouth daily at bedtime   divalproex sodium (DEPAKOTE) 500 mg EC tablet   Yes No   Sig: Take by mouth every 8 (eight) hours   nicotine (NICODERM CQ) 21 mg/24 hr TD 24 hr patch   Yes No   Sig: Place 1 patch on the skin every 24 hours      Facility-Administered Medications: None       Past Medical History:   Diagnosis Date    Anxiety     [facility] disorder ([facility] )     Depression     Intermittent explosive disorder     Psychiatric disorder     [facility]        Past Surgical History:   Procedure Laterality Date    LEG SURGERY         History reviewed  No pertinent family history  I have reviewed and agree with the history as documented      Social History     Tobacco Use    Smoking status: Current Some Day Smoker     Packs/day: 0 20     Years: 12 00     Pack years: 2 40     Types: Cigarettes    Smokeless tobacco: Never Used    Tobacco comment: " i don't know how many" Substance Use Topics    Alcohol use: No     Comment: pt denies    Drug use: Yes     Types: Heroin, Marijuana, Methamphetamines, Prescription     Comment: last use a few weeks ago        Review of Systems   Constitutional: Negative for activity change, appetite change and fever  HENT: Negative for congestion, rhinorrhea and sore throat  Eyes: Negative for pain and redness  Respiratory: Negative for cough, shortness of breath and wheezing  Cardiovascular: Negative for chest pain and palpitations  Gastrointestinal: Negative for abdominal pain, diarrhea, nausea and vomiting  Endocrine: Negative for polyuria  Genitourinary: Negative for difficulty urinating, dysuria, frequency and urgency  Musculoskeletal: Negative for arthralgias and myalgias  Skin: Negative for color change and rash  Allergic/Immunologic: Negative for immunocompromised state  Neurological: Negative for dizziness, syncope and light-headedness  Hematological: Does not bruise/bleed easily  Psychiatric/Behavioral: Positive for dysphoric mood and suicidal ideas  Negative for behavioral problems, confusion, hallucinations and self-injury  All other systems reviewed and are negative  Physical Exam  Physical Exam   Constitutional: He is oriented to person, place, and time  He appears well-developed  No distress  HENT:   Head: Normocephalic and atraumatic  Nose: Nose normal    Eyes: Pupils are equal, round, and reactive to light  Conjunctivae and EOM are normal  No scleral icterus  Neck: Normal range of motion  Neck supple  Cardiovascular: Normal rate, regular rhythm, normal heart sounds and intact distal pulses  Pulmonary/Chest: Effort normal and breath sounds normal  No stridor  No respiratory distress  He has no wheezes  Abdominal: Soft  He exhibits no distension  There is no tenderness  There is no rebound and no guarding  Musculoskeletal: He exhibits no edema or deformity     Neurological: He is alert and oriented to person, place, and time  Skin: Skin is warm and dry  No rash noted  Patient with multiple scabs on face  No sign of infection  Psychiatric:   Patient admits to depression and making suicidal statements   Nursing note and vitals reviewed  Vital Signs  ED Triage Vitals [02/19/19 0935]   Temperature Pulse Respirations Blood Pressure SpO2   97 7 °F (36 5 °C) 91 16 135/94 98 %      Temp Source Heart Rate Source Patient Position - Orthostatic VS BP Location FiO2 (%)   Tympanic Monitor Sitting Left arm --      Pain Score       --           Vitals:    02/19/19 0935 02/19/19 1218   BP: 135/94 129/80   Pulse: 91 72   Patient Position - Orthostatic VS: Sitting Lying       Visual Acuity      ED Medications  Medications - No data to display    Diagnostic Studies  Results Reviewed     Procedure Component Value Units Date/Time    Rapid drug screen, urine [[id number]]  (Abnormal) Collected:  02/19/19 1310    Lab Status:  Final result Specimen:  Urine, Clean Catch Updated:  02/19/19 1426     Amph/Meth UR Positive     Barbiturate Ur Negative     Benzodiazepine Urine Negative     Cocaine Urine Negative     Methadone Urine Negative     Opiate Urine Negative     PCP Ur Negative     THC Urine Positive    Narrative:       Presumptive report  If requested, specimen will be sent to reference lab for confirmation  FOR MEDICAL PURPOSES ONLY  IF CONFIRMATION NEEDED PLEASE CONTACT THE LAB WITHIN 5 DAYS    Drug Screen Cutoff Levels:  AMPHETAMINE/METHAMPHETAMINES  1000 ng/mL  BARBITURATES     200 ng/mL  BENZODIAZEPINES     200 ng/mL  COCAINE      300 ng/mL  METHADONE      300 ng/mL  OPIATES      300 ng/mL  PHENCYCLIDINE     25 ng/mL  THC       50 ng/mL    CBC and differential [[id number]]  (Abnormal) Collected:  02/19/19 0944    Lab Status:  Final result Specimen:  Blood from Arm, Left Updated:  02/19/19 1049     WBC 14 70 Thousand/uL      RBC 4 59 Million/uL      Hemoglobin 13 9 g/dL      Hematocrit 42 6 %      MCV 93 fL MCH 30 3 pg      MCHC 32 7 g/dL      RDW 15 4 %      MPV 7 7 fL      Platelets 407 Thousands/uL      Neutrophils Relative 69 %      Lymphocytes Relative 20 %      Monocytes Relative 9 %      Eosinophils Relative 2 %      Basophils Relative 1 %      Neutrophils Absolute 10 10 Thousands/µL      Lymphocytes Absolute 2 90 Thousands/µL      Monocytes Absolute 1 30 Thousand/µL      Eosinophils Absolute 0 30 Thousand/µL      Basophils Absolute 0 10 Thousands/µL     Comprehensive metabolic panel [[id number]]  (Abnormal) Collected:  02/19/19 0944    Lab Status:  Final result Specimen:  Blood from Arm, Left Updated:  02/19/19 1003     Sodium 141 mmol/L      Potassium 3 8 mmol/L      Chloride 102 mmol/L      CO2 27 mmol/L      ANION GAP 12 mmol/L      BUN 23 mg/dL      Creatinine 0 68 mg/dL      Glucose 105 mg/dL      Calcium 10 2 mg/dL      AST 63 U/L      ALT 38 U/L      Alkaline Phosphatase 81 U/L      Total Protein 9 0 g/dL      Albumin 5 2 g/dL      Total Bilirubin 0 50 mg/dL      eGFR 131 ml/min/1 73sq m     Narrative:       National Kidney Disease Education Program recommendations are as follows:  GFR calculation is accurate only with a steady state creatinine  Chronic Kidney disease less than 60 ml/min/1 73 sq  meters  Kidney failure less than 15 ml/min/1 73 sq  meters      Ethanol [[id number]]  (Normal) Collected:  02/19/19 0944    Lab Status:  Final result Specimen:  Blood from Arm, Left Updated:  02/19/19 1003     Ethanol Lvl <10 mg/dL                  No orders to display              Procedures  ECG 12 Lead Documentation  Date/Time: 2/19/2019 12:52 PM  Performed by: Leah Jo MD  Authorized by: Leah Jo MD     Indications / Diagnosis:  Psychiatric clearance  ECG reviewed by me, the ED Provider: yes    Patient location:  ED  Rate:     ECG rate assessment: tachycardic    Rhythm:     Rhythm: sinus rhythm    Ectopy:     Ectopy: none    QRS:     QRS axis:  Normal    QRS intervals: Normal  Conduction:     Conduction: normal    ST segments:     ST segments:  Normal  T waves:     T waves: normal             Phone Contacts  ED Phone Contact    ED Course  ED Course as of Feb 19 1454   Tue Feb 19, 2019   1043 Patient signed 201       1050 WBC likely elevated due to recent meth use and stress reaction  Patient is afebrile  No infectious signs/symptoms  [id number] Patient accepted at [facility]  MDM    Disposition  Final diagnoses:   Depression   Suicidal ideations     Time reflects when diagnosis was documented in both MDM as applicable and the Disposition within this note     Time User Action Codes Description Comment    2/19/2019 12:42 PM Darrion Nobles Add [F32 9] Depression     2/19/2019 12:42 PM Darrion Nobles Add [K72 851] Suicidal ideations       ED Disposition     ED Disposition Condition Date/Time Comment    Transfer to [address] [place] [address] Feb 19, 2019 12:42 PM Lula Bloom should be transferred out to [facility] and has been medically cleared  MD Documentation      Most Recent Value   Sending MD Dr Negrita Larson    None         Patient's Medications   Discharge Prescriptions    No medications on file     No discharge procedures on file      ED Provider  Electronically Signed by           Paige Olmos MD  02/19/19 7655

## 2019-02-19 NOTE — ED NOTES
Patient is a 33 yr old male brought to the Ed by his police, due to concerns of his sisters  Patient has a hx of mental illness and substance abuse  He has been non compliant with his medications, but is not able to express for how many days  He was admitted to [facility] in [facility] , was offered rehab at that time, however refused  Patient is also experiencing grief issues - his mother is  (end of January),  and they were very close  He has not been caring for himself  He presents as very sleepy, and paranoid, He is unable to answer any questions directly (history came from his sisters)  He is experiencing hallucinations - is reponding to "people" mumbling to himself  He is aware that his sis'ters are very concerned about his and he is agreeable to going back to dual treatment  He signed a 201  Patient has been hospitalized twice recently - [place] in 19 and [facility] 18  He was also evaluated for drug treatment (not clear why he did not go)  He is not following with outpt services at this time        Neil VARGHESE

## 2019-05-21 ENCOUNTER — HOSPITAL ENCOUNTER (EMERGENCY)
Facility: HOSPITAL | Age: 29
Discharge: HOME | End: 2019-05-21
Attending: EMERGENCY MEDICINE | Admitting: EMERGENCY MEDICINE
Payer: COMMERCIAL

## 2019-05-21 VITALS
HEART RATE: 109 BPM | SYSTOLIC BLOOD PRESSURE: 124 MMHG | TEMPERATURE: 98.5 F | RESPIRATION RATE: 16 BRPM | OXYGEN SATURATION: 98 % | DIASTOLIC BLOOD PRESSURE: 82 MMHG

## 2019-05-21 DIAGNOSIS — T40.1X1A HEROIN OVERDOSE (HCC): Primary | ICD-10-CM

## 2019-05-21 PROCEDURE — 99284 EMERGENCY DEPT VISIT MOD MDM: CPT

## 2019-05-21 PROCEDURE — 99282 EMERGENCY DEPT VISIT SF MDM: CPT | Performed by: EMERGENCY MEDICINE

## 2019-05-21 RX ORDER — ACETAMINOPHEN 325 MG/1
1000 TABLET ORAL ONCE
Status: COMPLETED | OUTPATIENT
Start: 2019-05-21 | End: 2019-05-21

## 2019-05-21 RX ORDER — ONDANSETRON 2 MG/ML
4 INJECTION INTRAMUSCULAR; INTRAVENOUS ONCE
Status: COMPLETED | OUTPATIENT
Start: 2019-05-21 | End: 2019-05-21

## 2019-05-21 RX ORDER — NALOXONE HYDROCHLORIDE 1 MG/ML
INJECTION INTRAMUSCULAR; INTRAVENOUS; SUBCUTANEOUS
Status: COMPLETED
Start: 2019-05-21 | End: 2019-05-21

## 2019-05-21 RX ADMIN — ACETAMINOPHEN 975 MG: 325 TABLET ORAL at 17:38

## 2019-05-24 ENCOUNTER — HOSPITAL ENCOUNTER (EMERGENCY)
Facility: HOSPITAL | Age: 29
End: 2019-05-25
Attending: EMERGENCY MEDICINE | Admitting: EMERGENCY MEDICINE
Payer: COMMERCIAL

## 2019-05-24 DIAGNOSIS — F11.10 HEROIN ABUSE (HCC): ICD-10-CM

## 2019-05-24 DIAGNOSIS — F19.10 SUBSTANCE ABUSE (HCC): Primary | ICD-10-CM

## 2019-05-24 DIAGNOSIS — F12.90 MARIJUANA USE: ICD-10-CM

## 2019-05-24 LAB
AMPHETAMINES SERPL QL SCN: NEGATIVE
BARBITURATES UR QL: NEGATIVE
BENZODIAZ UR QL: NEGATIVE
COCAINE UR QL: NEGATIVE
ETHANOL EXG-MCNC: NEGATIVE MG/DL
METHADONE UR QL: NEGATIVE
OPIATES UR QL SCN: POSITIVE
PCP UR QL: NEGATIVE
THC UR QL: POSITIVE

## 2019-05-24 PROCEDURE — 80307 DRUG TEST PRSMV CHEM ANLYZR: CPT | Performed by: EMERGENCY MEDICINE

## 2019-05-24 PROCEDURE — 82075 ASSAY OF BREATH ETHANOL: CPT | Performed by: EMERGENCY MEDICINE

## 2019-05-24 PROCEDURE — 99285 EMERGENCY DEPT VISIT HI MDM: CPT

## 2019-05-24 PROCEDURE — 99283 EMERGENCY DEPT VISIT LOW MDM: CPT | Performed by: EMERGENCY MEDICINE

## 2019-05-24 RX ORDER — LORAZEPAM 0.5 MG/1
0.5 TABLET ORAL ONCE
Status: COMPLETED | OUTPATIENT
Start: 2019-05-24 | End: 2019-05-24

## 2019-05-24 RX ORDER — IBUPROFEN 400 MG/1
400 TABLET ORAL ONCE
Status: COMPLETED | OUTPATIENT
Start: 2019-05-24 | End: 2019-05-24

## 2019-05-24 RX ORDER — NICOTINE 21 MG/24HR
21 PATCH, TRANSDERMAL 24 HOURS TRANSDERMAL ONCE
Status: DISCONTINUED | OUTPATIENT
Start: 2019-05-24 | End: 2019-05-25 | Stop reason: HOSPADM

## 2019-05-24 RX ORDER — ACETAMINOPHEN 325 MG/1
650 TABLET ORAL ONCE
Status: COMPLETED | OUTPATIENT
Start: 2019-05-24 | End: 2019-05-24

## 2019-05-24 RX ADMIN — ACETAMINOPHEN 650 MG: 325 TABLET ORAL at 16:43

## 2019-05-24 RX ADMIN — IBUPROFEN 400 MG: 400 TABLET ORAL at 21:50

## 2019-05-24 RX ADMIN — LORAZEPAM 0.5 MG: 0.5 TABLET ORAL at 17:33

## 2019-05-24 RX ADMIN — NICOTINE 21 MG: 21 PATCH, EXTENDED RELEASE TRANSDERMAL at 21:51

## 2019-05-25 VITALS
SYSTOLIC BLOOD PRESSURE: 108 MMHG | DIASTOLIC BLOOD PRESSURE: 55 MMHG | TEMPERATURE: 98.7 F | HEART RATE: 74 BPM | WEIGHT: 130 LBS | BODY MASS INDEX: 20.98 KG/M2 | OXYGEN SATURATION: 97 % | RESPIRATION RATE: 16 BRPM

## 2019-05-27 ENCOUNTER — TELEPHONE (OUTPATIENT)
Dept: FAMILY MEDICINE CLINIC | Facility: CLINIC | Age: 29
End: 2019-05-27

## 2019-05-30 ENCOUNTER — HOSPITAL ENCOUNTER (EMERGENCY)
Facility: HOSPITAL | Age: 29
Discharge: HOME | End: 2019-05-30
Attending: EMERGENCY MEDICINE | Admitting: EMERGENCY MEDICINE
Payer: COMMERCIAL

## 2019-05-30 VITALS
BODY MASS INDEX: 23.44 KG/M2 | RESPIRATION RATE: 16 BRPM | DIASTOLIC BLOOD PRESSURE: 83 MMHG | WEIGHT: 145.2 LBS | SYSTOLIC BLOOD PRESSURE: 131 MMHG | OXYGEN SATURATION: 99 % | HEART RATE: 74 BPM | TEMPERATURE: 98.8 F

## 2019-05-30 DIAGNOSIS — F19.10 DRUG ABUSE (HCC): Primary | ICD-10-CM

## 2019-05-30 DIAGNOSIS — F11.10 HEROIN ABUSE (HCC): ICD-10-CM

## 2019-05-30 LAB
AMPHETAMINES SERPL QL SCN: NEGATIVE
BARBITURATES UR QL: NEGATIVE
BENZODIAZ UR QL: NEGATIVE
COCAINE UR QL: NEGATIVE
ETHANOL EXG-MCNC: 0 MG/DL
METHADONE UR QL: POSITIVE
OPIATES UR QL SCN: POSITIVE
PCP UR QL: NEGATIVE
THC UR QL: NEGATIVE

## 2019-05-30 PROCEDURE — 80307 DRUG TEST PRSMV CHEM ANLYZR: CPT | Performed by: EMERGENCY MEDICINE

## 2019-05-30 PROCEDURE — 99283 EMERGENCY DEPT VISIT LOW MDM: CPT | Performed by: EMERGENCY MEDICINE

## 2019-05-30 PROCEDURE — 82075 ASSAY OF BREATH ETHANOL: CPT | Performed by: EMERGENCY MEDICINE

## 2019-05-30 PROCEDURE — 99284 EMERGENCY DEPT VISIT MOD MDM: CPT

## 2019-05-30 RX ORDER — ACETAMINOPHEN 325 MG/1
650 TABLET ORAL ONCE
Status: COMPLETED | OUTPATIENT
Start: 2019-05-30 | End: 2019-05-30

## 2019-05-30 RX ADMIN — ACETAMINOPHEN 650 MG: 325 TABLET ORAL at 19:29

## 2019-05-31 ENCOUNTER — HOSPITAL ENCOUNTER (EMERGENCY)
Facility: HOSPITAL | Age: 29
Discharge: HOME | End: 2019-05-31
Attending: EMERGENCY MEDICINE | Admitting: EMERGENCY MEDICINE
Payer: COMMERCIAL

## 2019-05-31 ENCOUNTER — HOSPITAL ENCOUNTER (EMERGENCY)
Facility: HOSPITAL | Age: 29
Discharge: OTHER INSTITUTION | End: 2019-05-31
Attending: EMERGENCY MEDICINE | Admitting: EMERGENCY MEDICINE
Payer: COMMERCIAL

## 2019-05-31 VITALS
RESPIRATION RATE: 18 BRPM | DIASTOLIC BLOOD PRESSURE: 60 MMHG | SYSTOLIC BLOOD PRESSURE: 116 MMHG | BODY MASS INDEX: 23.42 KG/M2 | WEIGHT: 145.1 LBS | HEART RATE: 91 BPM | TEMPERATURE: 99.3 F | OXYGEN SATURATION: 97 %

## 2019-05-31 VITALS
HEART RATE: 79 BPM | DIASTOLIC BLOOD PRESSURE: 57 MMHG | BODY MASS INDEX: 23.63 KG/M2 | SYSTOLIC BLOOD PRESSURE: 124 MMHG | TEMPERATURE: 99 F | WEIGHT: 146.39 LBS | RESPIRATION RATE: 16 BRPM | OXYGEN SATURATION: 96 %

## 2019-05-31 DIAGNOSIS — F19.10 SUBSTANCE ABUSE (HCC): Primary | ICD-10-CM

## 2019-05-31 DIAGNOSIS — F19.10 POLYSUBSTANCE ABUSE (HCC): ICD-10-CM

## 2019-05-31 DIAGNOSIS — F19.10 DRUG ABUSE (HCC): Primary | ICD-10-CM

## 2019-05-31 PROCEDURE — 99282 EMERGENCY DEPT VISIT SF MDM: CPT

## 2019-05-31 PROCEDURE — 99283 EMERGENCY DEPT VISIT LOW MDM: CPT | Performed by: EMERGENCY MEDICINE

## 2019-05-31 PROCEDURE — 99282 EMERGENCY DEPT VISIT SF MDM: CPT | Performed by: EMERGENCY MEDICINE

## 2019-05-31 PROCEDURE — 99284 EMERGENCY DEPT VISIT MOD MDM: CPT

## 2019-05-31 RX ORDER — ACETAMINOPHEN 325 MG/1
650 TABLET ORAL ONCE
Status: COMPLETED | OUTPATIENT
Start: 2019-05-31 | End: 2019-05-31

## 2019-05-31 RX ORDER — LORAZEPAM 0.5 MG/1
1 TABLET ORAL ONCE
Status: COMPLETED | OUTPATIENT
Start: 2019-05-31 | End: 2019-05-31

## 2019-05-31 RX ORDER — ASPIRIN 325 MG
325 TABLET ORAL ONCE
Status: COMPLETED | OUTPATIENT
Start: 2019-05-31 | End: 2019-05-31

## 2019-05-31 RX ADMIN — ACETAMINOPHEN 650 MG: 325 TABLET ORAL at 02:22

## 2019-05-31 RX ADMIN — LORAZEPAM 1 MG: 0.5 TABLET ORAL at 14:15

## 2019-05-31 RX ADMIN — ASPIRIN 325 MG ORAL TABLET 325 MG: 325 PILL ORAL at 02:22

## 2019-07-23 ENCOUNTER — HOSPITAL ENCOUNTER (EMERGENCY)
Facility: HOSPITAL | Age: 29
Discharge: HOME | End: 2019-07-23
Attending: EMERGENCY MEDICINE | Admitting: EMERGENCY MEDICINE
Payer: COMMERCIAL

## 2019-07-23 VITALS
OXYGEN SATURATION: 97 % | RESPIRATION RATE: 18 BRPM | SYSTOLIC BLOOD PRESSURE: 126 MMHG | TEMPERATURE: 98.8 F | BODY MASS INDEX: 22.95 KG/M2 | WEIGHT: 142.2 LBS | HEART RATE: 100 BPM | DIASTOLIC BLOOD PRESSURE: 58 MMHG

## 2019-07-23 DIAGNOSIS — T40.1X1A ACCIDENTAL OVERDOSE OF HEROIN, INITIAL ENCOUNTER (HCC): Primary | ICD-10-CM

## 2019-07-23 PROCEDURE — 99282 EMERGENCY DEPT VISIT SF MDM: CPT | Performed by: EMERGENCY MEDICINE

## 2019-07-23 PROCEDURE — 99283 EMERGENCY DEPT VISIT LOW MDM: CPT

## 2019-07-23 NOTE — ED PROVIDER NOTES
History  Chief Complaint   Patient presents with    Overdose - Accidental     Pt  used one bag of heroin  Pt  was [facility] narcan [facility] staff  Pt  alert and oriented in triage  Pt  states wanting to go to rehab,       History provided by:  Patient and EMS personnel  Overdose - Accidental   Ingested substance:  Illicit drugs  Suspected agents: heroin  Time since incident: minutes pta  Incident location: [facility]  Context: intentional ingestion    Associated symptoms: altered mental status and lethargy    Associated symptoms: no abdominal pain, no agitation, no anxiety, no chest pain, no diarrhea, no headaches, no nausea, no shortness of breath and no vomiting        Prior to Admission Medications   Prescriptions Last Dose Informant Patient Reported? Taking? FLUoxetine HCl (PROZAC PO)   Yes No   Sig: Take by mouth   QUEtiapine (SEROquel) 200 mg tablet   Yes No   Sig: Take 200 mg by mouth daily at bedtime   divalproex sodium (DEPAKOTE) 500 mg EC tablet   Yes No   Sig: Take by mouth every 8 (eight) hours   nicotine (NICODERM CQ) 21 mg/24 hr TD 24 hr patch   Yes No   Sig: Place 1 patch on the skin every 24 hours      Facility-Administered Medications: None       Past Medical History:   Diagnosis Date    Anxiety     [facility]     Depression     Intermittent explosive disorder     Psychiatric disorder     Sch[facility]ffective schizophrenia [facility]        Past Surgical History:   Procedure Laterality Date    LEG SURGERY         History reviewed  No pertinent family history  I have reviewed and agree with the history as documented  Social History     Tobacco Use    Smoking status: Current Every Day Smoker     Packs/day: 1 00     Years: 12 00     Pack years: 12 00     Types: Cigarettes    Smokeless tobacco: Never Used    Tobacco comment: " i don't know how many"   Substance Use Topics    Alcohol use:  Yes    Drug use: Yes     Types: Heroin, Marijuana, Methamphetamines, Prescription Comment: denies meth use currently        Review of Systems   Constitutional: Negative for fatigue, fever and unexpected weight change  HENT: Negative for congestion, ear pain, rhinorrhea and sore throat  Eyes: Negative for pain and discharge  Respiratory: Negative for chest tightness, shortness of breath, wheezing and stridor  Chest pain     Cardiovascular: Negative for chest pain, palpitations and leg swelling  Gastrointestinal: Negative for abdominal pain, constipation, diarrhea, nausea and vomiting  Endocrine: Negative for cold intolerance, heat intolerance, polydipsia, polyphagia and polyuria  Genitourinary: Negative for dysuria, flank pain, frequency and hematuria  Musculoskeletal: Negative for arthralgias, myalgias, neck pain and neck stiffness  Skin: Negative for color change and rash  Neurological: Negative for dizziness, numbness and headaches  Hematological: Negative for adenopathy  Psychiatric/Behavioral: Positive for confusion  Negative for agitation, behavioral problems, hallucinations, self-injury, sleep disturbance and suicidal ideas  The patient is not hyperactive  Physical Exam  Physical Exam   Constitutional: He is oriented to person, place, and time  He appears well-developed and well-nourished  HENT:   Mouth/Throat: No oropharyngeal exudate  TMs normal bilaterally no pharyngeal erythema no rhinorrhea nontender palpation of sinuses, normal looking turbinates   Eyes: Conjunctivae and EOM are normal    Neck: Normal range of motion  Neck supple  No meningeal signs   Cardiovascular: Normal rate, regular rhythm, normal heart sounds and intact distal pulses  Pulmonary/Chest: Effort normal and breath sounds normal  No respiratory distress  He has no wheezes  He has no rales  He exhibits no tenderness  Abdominal: Soft  Bowel sounds are normal  He exhibits no distension and no mass  There is no tenderness  No hernia     No cvat   Musculoskeletal: Normal range of motion  He exhibits no edema  Lymphadenopathy:     He has no cervical adenopathy  Neurological: He is alert and oriented to person, place, and time  No cranial nerve deficit  Skin: No rash noted  No erythema  No edema   Psychiatric: He has a normal mood and affect  His behavior is normal  Judgment and thought content normal    Nursing note and vitals reviewed  Vital Signs  ED Triage Vitals [07/23/19 1624]   Temperature Pulse Respirations Blood Pressure SpO2   98 8 °F (37 1 °C) 100 18 126/58 97 %      Temp Source Heart Rate Source Patient Position - Orthostatic VS BP Location FiO2 (%)   Oral Monitor Sitting Right arm --      Pain Score       --           Vitals:    07/23/19 1624   BP: 126/58   Pulse: 100   Patient Position - Orthostatic VS: Sitting         Visual Acuity      ED Medications  Medications - No data to display    Diagnostic Studies  Results Reviewed     None                 No orders to display              Procedures  Procedures       ED Course                               MDM  Number of Diagnoses or Management Options  Accidental overdose of heroin, initial encounter [facility]):   Diagnosis management comments: Accidental heroin overdose  Patient was observed for an hour after period of time from administration of Narcan  Patient offered to speak to her posterior but states he is resources in place to go to a rehab as an outpatient        Disposition  Final diagnoses:   Accidental overdose of heroin, initial encounter [facility])     Time reflects when diagnosis was documented in both MDM as applicable and the Disposition within this note     Time User Action Codes Description Comment    7/23/2019  4:56 PM Juan Goins Add [F11 10] [facility] ([facility] )     7/23/2019  4:56 PM Patricia Hidalgo Remove [F11 10] Heroin abuse ([facility] )     7/23/2019  4:56 PM Patricia Hidalgo Add [T40 1X1A] Accidental overdose of heroin, initial encounter [facility])       ED Disposition     ED Disposition Condition Date/Time Comment    Discharge Stable Tue Jul 23, 2019  4:56 PM Jose KhannaAngelkodi discharge to home/self care  Follow-up Information     Follow up With Specialties Details Why Contact Info    [employer]  Schedule an appointment as soon as possible for a visit in 2 days  [phone]            Discharge Medication List as of 7/23/2019  4:56 PM      CONTINUE these medications which have NOT CHANGED    Details   divalproex sodium (DEPAKOTE) 500 mg EC tablet Take by mouth every 8 (eight) hours, Historical Med      FLUoxetine HCl (PROZAC PO) Take by mouth, Historical Med      nicotine (NICODERM CQ) 21 mg/24 hr TD 24 hr patch Place 1 patch on the skin every 24 hours, Historical Med      QUEtiapine (SEROquel) 200 mg tablet Take 200 mg by mouth daily at bedtime, Historical Med           No discharge procedures on file      ED Provider  Electronically Signed by           Army Sander MD  07/23/19 4461

## 2019-08-30 ENCOUNTER — HOSPITAL ENCOUNTER (EMERGENCY)
Facility: HOSPITAL | Age: 29
Discharge: HOME | End: 2019-08-30
Attending: EMERGENCY MEDICINE | Admitting: EMERGENCY MEDICINE
Payer: COMMERCIAL

## 2019-08-30 VITALS
OXYGEN SATURATION: 98 % | TEMPERATURE: 97.3 F | HEART RATE: 80 BPM | SYSTOLIC BLOOD PRESSURE: 114 MMHG | DIASTOLIC BLOOD PRESSURE: 70 MMHG | RESPIRATION RATE: 18 BRPM

## 2019-08-30 DIAGNOSIS — T40.1X1A HEROIN OVERDOSE (HCC): ICD-10-CM

## 2019-08-30 DIAGNOSIS — F19.10 SUBSTANCE ABUSE (HCC): Primary | ICD-10-CM

## 2019-08-30 PROCEDURE — 99282 EMERGENCY DEPT VISIT SF MDM: CPT | Performed by: EMERGENCY MEDICINE

## 2019-08-30 PROCEDURE — 99284 EMERGENCY DEPT VISIT MOD MDM: CPT

## 2019-08-30 RX ORDER — NALOXONE HYDROCHLORIDE 1 MG/ML
INJECTION INTRAMUSCULAR; INTRAVENOUS; SUBCUTANEOUS
Status: COMPLETED
Start: 2019-08-30 | End: 2019-08-30

## 2019-08-30 NOTE — ED PROVIDER NOTES
History  Chief Complaint   Patient presents with    Overdose - Accidental     Pt overdosed on Heroin, was clean a few weeks  81-year-old gentleman presents status post accidental overdose of heroin  He reports that he had been clean for several weeks as he has been attending the Suboxone clinic  He missed his appointment today and therefore decided to use heroin  He admits to injecting one bag in his left antecubital region  He was found unresponsive in the middle the street  Bystanders watch him for a period of approximately 10 minutes but whenever he turned increasingly blue, they called EMS  EMS provided 2 mg of Narcan IV with complete resolution of his unresponsiveness  Currently the patient denies any acute symptoms or complaints  He denies any concurrent drug or alcohol use  Overdose - Accidental   Ingested substance:  Illicit drugs  Suspected agents: Heroin  Context: recreational    Associated symptoms: altered mental status, lethargy and unresponsiveness    Associated symptoms: no abdominal pain, no agitation, no anxiety, no chest pain, no cough, no diaphoresis, no diarrhea, no headaches, no nausea, no shortness of breath, no slurred speech, no visual changes and no vomiting        Prior to Admission Medications   Prescriptions Last Dose Informant Patient Reported? Taking?    FLUoxetine HCl (PROZAC PO)   Yes No   Sig: Take by mouth   QUEtiapine (SEROquel) 200 mg tablet   Yes No   Sig: Take 200 mg by mouth daily at bedtime   divalproex sodium (DEPAKOTE) 500 mg EC tablet   Yes No   Sig: Take by mouth every 8 (eight) hours   nicotine (NICODERM CQ) 21 mg/24 hr TD 24 hr patch   Yes No   Sig: Place 1 patch on the skin every 24 hours      Facility-Administered Medications: None       Past Medical History:   Diagnosis Date    Anxiety     [facility] )     Depression     Intermittent explosive disorder     Psychiatric disorder     Schizo-affective schizophrenia ([facility] )        Past Surgical History:   Procedure Laterality Date    LEG SURGERY         History reviewed  No pertinent family history  I have reviewed and agree with the history as documented  Social History     Tobacco Use    Smoking status: Current Every Day Smoker     Packs/day: 1 00     Years: 12 00     Pack years: 12 00     Types: Cigarettes    Smokeless tobacco: Never Used    Tobacco comment: " i don't know how many"   Substance Use Topics    Alcohol use: Not Currently    Drug use: Yes     Types: Heroin, Marijuana, Methamphetamines, Prescription     Comment: denies meth use currently        Review of Systems   Constitutional: Negative for activity change, chills, diaphoresis, fatigue and fever  HENT: Negative  Negative for congestion, postnasal drip, rhinorrhea, sinus pain, sore throat and trouble swallowing  Eyes: Negative  Respiratory: Negative  Negative for cough and shortness of breath  Cardiovascular: Negative for chest pain  Gastrointestinal: Negative for abdominal pain, constipation, diarrhea, nausea and vomiting  Endocrine: Negative  Genitourinary: Negative  Musculoskeletal: Negative  Negative for arthralgias, back pain and myalgias  Skin: Negative  Allergic/Immunologic: Negative  Neurological: Negative  Negative for headaches  Hematological: Negative  Psychiatric/Behavioral: Negative for agitation  Physical Exam  Physical Exam   Constitutional: He is oriented to person, place, and time  He appears well-developed and well-nourished  No distress  HENT:   Head: Normocephalic and atraumatic  Nose: Nose normal    Mouth/Throat: Oropharynx is clear and moist    Eyes: Pupils are equal, round, and reactive to light  Conjunctivae are normal    Neck: Neck supple  Cardiovascular: Normal rate, regular rhythm and normal heart sounds  Pulmonary/Chest: Effort normal and breath sounds normal  No respiratory distress  Abdominal: Soft   Bowel sounds are normal  He exhibits no distension  There is no tenderness  There is no guarding  Musculoskeletal: Normal range of motion  He exhibits no edema  Neurological: He is alert and oriented to person, place, and time  He has normal strength  No cranial nerve deficit or sensory deficit  GCS eye subscore is 4  GCS verbal subscore is 5  GCS motor subscore is 6  Skin: Skin is warm and dry  Capillary refill takes less than 2 seconds  He is not diaphoretic  Psychiatric: He has a normal mood and affect  His behavior is normal    Nursing note and vitals reviewed  Vital Signs  ED Triage Vitals [08/30/19 1030]   Temperature Pulse Respirations Blood Pressure SpO2   (!) 97 3 °F (36 3 °C) 89 16 126/89 98 %      Temp Source Heart Rate Source Patient Position - Orthostatic VS BP Location FiO2 (%)   Tympanic Monitor Sitting Left arm --      Pain Score       No Pain           Vitals:    08/30/19 1030 08/30/19 1058 08/30/19 1100 08/30/19 1130   BP: 126/89 125/66 115/68 113/60   Pulse: 89 88     Patient Position - Orthostatic VS: Sitting Lying           Visual Acuity  Visual Acuity      Most Recent Value   L Pupil Size (mm)  2   R Pupil Size (mm)  2          ED Medications  Medications   naloxone (NARCAN) 2 MG/2ML injection **ADS Override Pull** (  Given to EMS 8/30/19 1038)       Diagnostic Studies  Results Reviewed     None                 No orders to display              Procedures  Procedures       ED Course                               MDM  Number of Diagnoses or Management Options  Heroin overdose [facility]):   Substance abuse [facility]):   Diagnosis management comments: 61-year-old gentleman presents status post accidental overdose of heroin  After period of observation, the patient remained awake and alert with no acute complaints  He declines any rehab options at this point time  He is aware of the need to refrain from drug abuse along with reasons to return to the ER        Disposition  Final diagnoses:   Substance abuse ([address] )   Heroin overdose [facility])     Time reflects when diagnosis was documented in both MDM as applicable and the Disposition within this note     Time User Action Codes Description Comment    8/30/2019 12:05 PM Veneda Colon Add [F19 10] Substance abuse ([address] )     8/30/2019 12:05 PM Veneda Colon Add [T40 1X1A] Heroin overdose [facility])       ED Disposition     ED Disposition Condition Date/Time Comment    Discharge Stable Fri Aug 30, 2019 10:26 AM Jamal Rivera discharge to home/self care  Follow-up Information    None         Patient's Medications   Discharge Prescriptions    No medications on file     No discharge procedures on file      ED Provider  Electronically Signed by           Donnell Lombardi DO  08/30/19 9555

## 2019-08-31 ENCOUNTER — HOSPITAL ENCOUNTER (EMERGENCY)
Facility: HOSPITAL | Age: 29
Discharge: HOME | End: 2019-08-31
Attending: EMERGENCY MEDICINE | Admitting: EMERGENCY MEDICINE
Payer: COMMERCIAL

## 2019-08-31 VITALS
HEART RATE: 92 BPM | RESPIRATION RATE: 16 BRPM | SYSTOLIC BLOOD PRESSURE: 120 MMHG | TEMPERATURE: 98 F | WEIGHT: 138.45 LBS | DIASTOLIC BLOOD PRESSURE: 78 MMHG | OXYGEN SATURATION: 98 %

## 2019-08-31 DIAGNOSIS — F11.10 HEROIN ABUSE (HCC): Primary | ICD-10-CM

## 2019-08-31 DIAGNOSIS — R73.9 HYPERGLYCEMIA: ICD-10-CM

## 2019-08-31 LAB — GLUCOSE SERPL-MCNC: 285 MG/DL (ref 65–140)

## 2019-08-31 PROCEDURE — 99282 EMERGENCY DEPT VISIT SF MDM: CPT | Performed by: EMERGENCY MEDICINE

## 2019-08-31 PROCEDURE — 99283 EMERGENCY DEPT VISIT LOW MDM: CPT

## 2019-08-31 PROCEDURE — 82948 REAGENT STRIP/BLOOD GLUCOSE: CPT

## 2019-08-31 NOTE — ED PROVIDER NOTES
History  Chief Complaint   Patient presents with    Drug Problem     pt arrives by [facility] after an unresponsive episode  pt reports snorting half a bag of heroin  Patient is the 81-year-old white male who presents for concerns of heroin abuse  He was at home with family when he states he injected half a bag of heroin  He states uses daily but cannot quantify how much  States he has are trying to get into a Suboxone clinic in his not want to be seen by anybody today about getting into a rehab facility sooner  Currently awake, slightly somnolent, pinpoint pupils but was otherwise without complaint  The patient is requesting food, he was advised he needs to wake up more before he can be given food  History provided by:  Patient and EMS personnel   used: No        None       History reviewed  No pertinent past medical history  History reviewed  No pertinent surgical history  History reviewed  No pertinent family history  I have reviewed and agree with the history as documented  Social History     Tobacco Use    Smoking status: Current Every Day Smoker     Packs/day: 1 00     Types: Cigarettes    Smokeless tobacco: Never Used   Substance Use Topics    Alcohol use: Not Currently    Drug use: Yes     Types: Heroin, Marijuana        Review of Systems   Constitutional: Negative  Negative for chills and fever  HENT: Negative  Negative for rhinorrhea, sore throat, trouble swallowing and voice change  Eyes: Negative  Negative for pain and visual disturbance  Respiratory: Negative  Negative for cough, shortness of breath and wheezing  Cardiovascular: Negative  Negative for chest pain and palpitations  Gastrointestinal: Negative for abdominal pain, diarrhea, nausea and vomiting  Genitourinary: Negative  Negative for dysuria and frequency  Musculoskeletal: Negative  Negative for neck pain and neck stiffness  Skin: Negative  Negative for rash     Neurological: Negative  Negative for dizziness, speech difficulty, weakness, light-headedness and numbness  Physical Exam  Physical Exam   Constitutional: He is oriented to person, place, and time  He appears well-developed and well-nourished  No distress  HENT:   Head: Normocephalic and atraumatic  Mouth/Throat: Oropharynx is clear and moist    Eyes: Pupils are equal, round, and reactive to light  Conjunctivae and EOM are normal    Neck: Normal range of motion  Neck supple  No tracheal deviation present  Cardiovascular: Normal rate, regular rhythm and intact distal pulses  Pulmonary/Chest: Effort normal and breath sounds normal  No respiratory distress  He has no wheezes  He has no rales  Abdominal: Soft  Bowel sounds are normal  He exhibits no distension  There is no tenderness  There is no rebound and no guarding  Musculoskeletal: Normal range of motion  He exhibits no tenderness or deformity  Neurological: He is alert and oriented to person, place, and time  Skin: Skin is warm and dry  Capillary refill takes less than 2 seconds  No rash noted  Psychiatric: He has a normal mood and affect  His behavior is normal    Nursing note and vitals reviewed        Vital Signs  ED Triage Vitals [08/31/19 1533]   Temperature Pulse Respirations Blood Pressure SpO2   (!) 96 6 °F (35 9 °C) 99 14 121/81 98 %      Temp Source Heart Rate Source Patient Position - Orthostatic VS BP Location FiO2 (%)   Tympanic Monitor Sitting Left arm --      Pain Score       --           Vitals:    08/31/19 1533 08/31/19 1824   BP: 121/81 120/78   Pulse: 99 92   Patient Position - Orthostatic VS: Sitting Lying         Visual Acuity      ED Medications  Medications - No data to display    Diagnostic Studies  Results Reviewed     Procedure Component Value Units Date/Time    Fingerstick Glucose (POCT) [[id number]]  (Abnormal) Collected:  08/31/19 1550    Lab Status:  Final result Updated:  08/31/19 1653     POC Glucose 285 mg/dl No orders to display              Procedures  Procedures       ED Course                               MDM  Number of Diagnoses or Management Options  Heroin abuse ([facility] ): Hyperglycemia:   Diagnosis management comments: Patient is a 27-year-old male who presented for heroin abuse  He is sleepy but awake, breathing appropriately, has pinpoint pupils, but no obvious signs of trauma and admits to using heroin today  Exam is consistent with her abuse, will continue to monitor him closely and allow him to metabolize to clinical sobriety  Patient was monitored in the emergency room for 2 hours, is now awake and more alert, tolerating p o  Challenge  He is ambulating without difficulty  He continues declined wanting to be evaluated by opiate treatment team   He states he will follow up as an outpatient  Advised to establish with a PCP, stop using  Opioid narcotics and heroin, strict return precautions were discussed  Amount and/or Complexity of Data Reviewed  Tests in the medicine section of CPT®: ordered and reviewed        Disposition  Final diagnoses:   Heroin abuse ([facility] )   Hyperglycemia     Time reflects when diagnosis was documented in both MDM as applicable and the Disposition within this note     Time User Action Codes Description Comment    8/31/2019  6:17 PM Lorcandelarioa Grain Add [F11 10] Heroin abuse ([facility] )     8/31/2019  6:18 PM Loreatha Grain Add [R73 9] Hyperglycemia       ED Disposition     ED Disposition Condition Date/Time Comment    Discharge Stable Sat Aug 31, 2019  6:17 PM Lorenzo Winslow discharge to home/self care              Follow-up Information     Follow up With Specialties Details Why Contact Info Additional [email or web address] In 1 week  [address], [address] [facility] [address]  [address], [address], [address] [place], [place], [address]          There are no discharge medications for this patient  No discharge procedures on file      ED Provider  Electronically Signed by           Doc Woods DO  08/31/19 5320

## 2019-08-31 NOTE — ED NOTES
Patient is resting comfortably  Respirations adequate and SaO2 WNL        Madi Burns RN  08/31/19 7241

## 2019-09-17 ENCOUNTER — HOSPITAL ENCOUNTER (EMERGENCY)
Facility: HOSPITAL | Age: 29
Discharge: HOME | End: 2019-09-17
Attending: EMERGENCY MEDICINE | Admitting: EMERGENCY MEDICINE
Payer: COMMERCIAL

## 2019-09-17 VITALS
OXYGEN SATURATION: 95 % | DIASTOLIC BLOOD PRESSURE: 79 MMHG | BODY MASS INDEX: 22.25 KG/M2 | SYSTOLIC BLOOD PRESSURE: 140 MMHG | HEIGHT: 66 IN | RESPIRATION RATE: 16 BRPM | HEART RATE: 102 BPM | TEMPERATURE: 97.9 F | WEIGHT: 138.45 LBS

## 2019-09-17 DIAGNOSIS — F11.10 HEROIN ABUSE (HCC): ICD-10-CM

## 2019-09-17 DIAGNOSIS — F19.10 SUBSTANCE ABUSE (HCC): Primary | ICD-10-CM

## 2019-09-17 PROCEDURE — 99283 EMERGENCY DEPT VISIT LOW MDM: CPT | Performed by: EMERGENCY MEDICINE

## 2019-09-17 PROCEDURE — 99284 EMERGENCY DEPT VISIT MOD MDM: CPT

## 2019-09-17 NOTE — ED PROVIDER NOTES
History  Chief Complaint   Patient presents with    Overdose - Accidental     found in a porch sleeping, easy to arouse  admits to snorting 1 bag heroine     Patient is a 64-year-old male well known to me in this department who presents after substance abuse again today  Patient admits to snorting of heroin and smoking some K2 with friend  Patient was seen on a local knee report and [facility] [facility] was called  Patient denies any suicidal ideations  Denies any complaints this time  States that he was begging for money and was given drugs instead  No Narcan was administered by EMS  Prior to Admission Medications   Prescriptions Last Dose Informant Patient Reported? Taking? FLUoxetine HCl (PROZAC PO)   Yes No   Sig: Take by mouth   QUEtiapine (SEROquel) 200 mg tablet   Yes No   Sig: Take 200 mg by mouth daily at bedtime   divalproex sodium (DEPAKOTE) 500 mg EC tablet   Yes No   Sig: Take by mouth every 8 (eight) hours   nicotine (NICODERM CQ) 21 mg/24 hr TD 24 hr patch   Yes No   Sig: Place 1 patch on the skin every 24 hours      Facility-Administered Medications: None       Past Medical History:   Diagnosis Date    Anxiety     [facility]ipolar disorder ([facility] )     Depression     Intermittent explosive disorder     Psychiatric disorder     Schizo-affective schizophrenia ([facility] )        Past Surgical History:   Procedure Laterality Date    LEG SURGERY         History reviewed  No pertinent family history  I have reviewed and agree with the history as documented      Social History     Tobacco Use    Smoking status: Current Every Day Smoker     Packs/day: 1 00     Years: 12 00     Pack years: 12 00     Types: Cigarettes    Smokeless tobacco: Never Used    Tobacco comment: " i don't know how many"   Substance Use Topics    Alcohol use: Not Currently    Drug use: Yes     Types: Methamphetamines, Prescription, Heroin, Marijuana     Comment: denies meth use currently        Review of Systems   Constitutional: Positive for activity change  HENT: Negative  Eyes: Negative  Respiratory: Negative  Cardiovascular: Negative  Gastrointestinal: Negative  Endocrine: Negative  Genitourinary: Negative  Musculoskeletal: Negative  Skin: Negative  Allergic/Immunologic: Negative  Neurological: Negative  Hematological: Negative  Psychiatric/Behavioral: Negative  All other systems reviewed and are negative  Physical Exam  Physical Exam   Constitutional: He is oriented to person, place, and time  He appears well-developed and well-nourished  HENT:   Head: Normocephalic  Right Ear: External ear normal    Left Ear: External ear normal    Nose: Nose normal    Mouth/Throat: Oropharynx is clear and moist    Eyes: Pupils are equal, round, and reactive to light  Conjunctivae are normal    Neck: Normal range of motion  Neck supple  Cardiovascular: Normal rate, regular rhythm, normal heart sounds and intact distal pulses  Pulmonary/Chest: Effort normal and breath sounds normal    Abdominal: Soft  Bowel sounds are normal    Musculoskeletal: Normal range of motion  Neurological: He is alert and oriented to person, place, and time  Skin: Capillary refill takes less than 2 seconds  Patient has a picked open scab on the lateral neck   Psychiatric: He has a normal mood and affect  His behavior is normal    Vitals reviewed        Vital Signs  ED Triage Vitals [09/17/19 1337]   Temperature Pulse Respirations Blood Pressure SpO2   97 9 °F (36 6 °C) 102 16 140/79 95 %      Temp src Heart Rate Source Patient Position - Orthostatic VS BP Location FiO2 (%)   -- -- -- -- --      Pain Score       No Pain           Vitals:    09/17/19 1337   BP: 140/79   Pulse: 102         Visual Acuity      ED Medications  Medications - No data to display    Diagnostic Studies  Results Reviewed     None                 No orders to display              Procedures  Procedures       ED Course MDM  Number of Diagnoses or Management Options  Heroin abuse ([facility] ):   Substance abuse ([facility] ):      Amount and/or Complexity of Data Reviewed  Review and summarize past medical records: yes        Disposition  Final diagnoses:   Substance abuse ([facility] )   Heroin abuse ([facility] )     Time reflects when diagnosis was documented in both MDM as applicable and the Disposition within this note     Time User Action Codes Description Comment    9/17/2019  2:02 PM Mare Means Add [F19 10] Substance abuse ([facility] )     9/17/2019  2:02 PM Mare Means Add [F11 10] Heroin abuse [facility])       ED Disposition     ED Disposition Condition Date/Time Comment    Discharge Stable Tue Sep 17, 2019  2:02 PM Jessica Rivera discharge to home/self care  Follow-up Information     Follow up With Specialties Details Why Chrissy   [facility]  [address]  [phone]            Patient's Medications   Discharge Prescriptions    No medications on file     No discharge procedures on file      ED Provider  Electronically Signed by           Ritchie Rodgers MD  09/17/19 9709

## 2019-10-05 ENCOUNTER — HOSPITAL ENCOUNTER (EMERGENCY)
Facility: HOSPITAL | Age: 29
Discharge: HOME | End: 2019-10-06
Attending: EMERGENCY MEDICINE
Payer: COMMERCIAL

## 2019-10-05 DIAGNOSIS — F19.94 SUBSTANCE INDUCED MOOD DISORDER (HCC): Primary | ICD-10-CM

## 2019-10-05 LAB
AMPHETAMINES SERPL QL SCN: NEGATIVE
BARBITURATES UR QL: NEGATIVE
BENZODIAZ UR QL: NEGATIVE
COCAINE UR QL: POSITIVE
ETHANOL EXG-MCNC: 0 MG/DL
ETHANOL EXG-MCNC: 0 MG/DL
METHADONE UR QL: NEGATIVE
OPIATES UR QL SCN: POSITIVE
PCP UR QL: NEGATIVE
THC UR QL: POSITIVE

## 2019-10-05 PROCEDURE — 80307 DRUG TEST PRSMV CHEM ANLYZR: CPT | Performed by: EMERGENCY MEDICINE

## 2019-10-05 PROCEDURE — 82075 ASSAY OF BREATH ETHANOL: CPT | Performed by: EMERGENCY MEDICINE

## 2019-10-05 PROCEDURE — 99282 EMERGENCY DEPT VISIT SF MDM: CPT | Performed by: EMERGENCY MEDICINE

## 2019-10-05 PROCEDURE — 99284 EMERGENCY DEPT VISIT MOD MDM: CPT

## 2019-10-06 VITALS
BODY MASS INDEX: 23.48 KG/M2 | HEART RATE: 57 BPM | DIASTOLIC BLOOD PRESSURE: 88 MMHG | RESPIRATION RATE: 16 BRPM | OXYGEN SATURATION: 99 % | WEIGHT: 145.5 LBS | SYSTOLIC BLOOD PRESSURE: 138 MMHG | TEMPERATURE: 97.6 F

## 2019-10-06 NOTE — ED NOTES
Patient woke, used the bathroom, and completed vitals  Patient talked to crisis about what his plan was going to be  Patient asked crisis if he should just come back if he feels suicidal and crisis said of course however if he wanted rehab or detox help he can go to [facility] center  Crisis reviewed the out patient resources and options  Patient was grateful and will be discharged  Doctor in agreement with discharge

## 2019-10-06 NOTE — ED PROVIDER NOTES
History  Chief Complaint   Patient presents with    Psychiatric Evaluation     Patient states he wants to hurt himself  31 y/o male c/o feeling suicidal for "weeks"; states worse today after using one bag of heroin  Patient admits to using IV heroin daily and usually uses two-three bags daily  States has no specific plan  Denies alcohol or other illicit drugs  Compliant with meds  States just "wants to end it"; last heroin use was PTA  History of schizoaffective d/o, anxiety, BPD, and intermittent explosive disorder  Denies any other complaints at this time  Prior to Admission Medications   Prescriptions Last Dose Informant Patient Reported? Taking? FLUoxetine HCl (PROZAC PO)   Yes No   Sig: Take by mouth   QUEtiapine (SEROquel) 200 mg tablet   Yes No   Sig: Take 200 mg by mouth daily at bedtime   divalproex sodium (DEPAKOTE) 500 mg EC tablet   Yes No   Sig: Take by mouth every 8 (eight) hours   nicotine (NICODERM CQ) 21 mg/24 hr TD 24 hr patch   Yes No   Sig: Place 1 patch on the skin every 24 hours      Facility-Administered Medications: None       Past Medical History:   Diagnosis Date    Anxiety     Bipolar disorder ([facility] )     Depression     Intermittent explosive disorder     Psychiatric disorder     Schizo-affective schizophrenia ([facility] )        Past Surgical History:   Procedure Laterality Date    LEG SURGERY         History reviewed  No pertinent family history  I have reviewed and agree with the history as documented      Social History     Tobacco Use    Smoking status: Current Every Day Smoker     Packs/day: 1 00     Years: 12 00     Pack years: 12 00     Types: Cigarettes    Smokeless tobacco: Never Used    Tobacco comment: " i don't know how many"   Substance Use Topics    Alcohol use: Not Currently    Drug use: Yes     Types: Methamphetamines, Prescription, Heroin, Marijuana     Comment: denies meth use currently        Review of Systems   Psychiatric/Behavioral: Positive for suicidal ideas  The patient is nervous/anxious  All other systems reviewed and are negative  Physical Exam  Physical Exam   Constitutional: He is oriented to person, place, and time  He appears well-developed and well-nourished  No distress  HENT:   Head: Normocephalic and atraumatic  Right Ear: External ear normal    Left Ear: External ear normal    Eyes: Pupils are equal, round, and reactive to light  Conjunctivae and EOM are normal    Neck: Normal range of motion  Neck supple  Cardiovascular: Normal rate, regular rhythm and normal heart sounds  Pulmonary/Chest: Effort normal and breath sounds normal    Abdominal: Soft  Bowel sounds are normal    Musculoskeletal: Normal range of motion  Neurological: He is alert and oriented to person, place, and time  No cranial nerve deficit or sensory deficit  Skin: Skin is warm and dry  Capillary refill takes less than 2 seconds  Psychiatric: His speech is delayed and slurred  He is slowed  He exhibits a depressed mood  He expresses suicidal ideation  Vitals reviewed        Vital Signs  ED Triage Vitals [10/05/19 2142]   Temperature Pulse Respirations Blood Pressure SpO2   97 6 °F (36 4 °C) 70 14 99/57 99 %      Temp Source Heart Rate Source Patient Position - Orthostatic VS BP Location FiO2 (%)   Tympanic Monitor Sitting Left arm --      Pain Score       No Pain           Vitals:    10/05/19 2142 10/06/19 0627   BP: 99/57 138/88   Pulse: 70 57   Patient Position - Orthostatic VS: Sitting Sitting         Visual Acuity      ED Medications  Medications - No data to display    Diagnostic Studies  Results Reviewed     Procedure Component Value Units Date/Time    Rapid drug screen, urine [[id number]]  (Abnormal) Collected:  10/05/19 2200    Lab Status:  Final result Specimen:  Urine, Clean Catch Updated:  10/05/19 2226     Amph/Meth UR Negative     Barbiturate Ur Negative     Benzodiazepine Urine Negative     Cocaine Urine Positive     Methadone Urine Negative     Opiate Urine Positive     PCP Ur Negative     THC Urine Positive    Narrative:       Presumptive report  If requested, specimen will be sent to reference lab for confirmation  FOR MEDICAL PURPOSES ONLY  IF CONFIRMATION NEEDED PLEASE CONTACT THE LAB WITHIN 5 DAYS  Drug Screen Cutoff Levels:  AMPHETAMINE/METHAMPHETAMINES  1000 ng/mL  BARBITURATES     200 ng/mL  BENZODIAZEPINES     200 ng/mL  COCAINE      300 ng/mL  METHADONE      300 ng/mL  OPIATES      300 ng/mL  PHENCYCLIDINE     25 ng/mL  THC       50 ng/mL      POCT alcohol breath test [[id number]]  (Normal) Resulted:  10/05/19 2215    Lab Status:  Final result Updated:  10/05/19 2216     EXTBreath Alcohol 0 00    POCT alcohol breath test [[id number]]  (Normal) Resulted:  10/05/19 2204    Lab Status:  Final result Updated:  10/05/19 2204     EXTBreath Alcohol 0 000                 No orders to display              Procedures  Procedures       ED Course  ED Course as of Oct 06 5001   Sat Oct 05, 2019   2242 UDS positive for cocaine, opiates, and THC  Will reeval patient when sober  MDM  Number of Diagnoses or Management Options  Substance induced mood disorder [facility]):   Diagnosis management comments: After observation period, patient spoke with Crisis and willing to followup as an outpatient  Denies feeling suicidal   Clinically sober at 0630  Disposition  Final diagnoses:   Substance induced mood disorder ([address] )     Time reflects when diagnosis was documented in both MDM as applicable and the Disposition within this note     Time User Action Codes Description Comment    10/6/2019 12:16 AM Carmen Crane Add [F19 94] Substance induced mood disorder [facility])       ED Disposition     ED Disposition Condition Date/Time Comment    Discharge Stable Sun Oct 6, 2019  6:30 AM Delia Rivera discharge to home/self care              Follow-up Information     Follow up With Specialties Details Why Contact Jaun Anderson MD Family Medicine Schedule an appointment as soon as possible for a visit in 1 week As needed [address]  [phone]            Patient's Medications   Discharge Prescriptions    No medications on file     No discharge procedures on file      ED Provider  Electronically Signed by           Isac Carrillo DO  10/06/19 7459

## 2019-10-08 ENCOUNTER — HOSPITAL ENCOUNTER (EMERGENCY)
Facility: HOSPITAL | Age: 29
Discharge: HOME | End: 2019-10-09
Attending: EMERGENCY MEDICINE
Payer: COMMERCIAL

## 2019-10-08 DIAGNOSIS — F19.10 SUBSTANCE ABUSE (HCC): Primary | ICD-10-CM

## 2019-10-08 LAB
AMPHETAMINES SERPL QL SCN: NEGATIVE
BARBITURATES UR QL: NEGATIVE
BENZODIAZ UR QL: NEGATIVE
COCAINE UR QL: POSITIVE
ETHANOL EXG-MCNC: NEGATIVE MG/DL
METHADONE UR QL: NEGATIVE
OPIATES UR QL SCN: POSITIVE
PCP UR QL: NEGATIVE
THC UR QL: POSITIVE

## 2019-10-08 PROCEDURE — 99282 EMERGENCY DEPT VISIT SF MDM: CPT | Performed by: EMERGENCY MEDICINE

## 2019-10-08 PROCEDURE — 82075 ASSAY OF BREATH ETHANOL: CPT | Performed by: EMERGENCY MEDICINE

## 2019-10-08 PROCEDURE — 80307 DRUG TEST PRSMV CHEM ANLYZR: CPT | Performed by: EMERGENCY MEDICINE

## 2019-10-08 PROCEDURE — 99285 EMERGENCY DEPT VISIT HI MDM: CPT
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
## 2019-10-09 VITALS
TEMPERATURE: 97.7 F | HEART RATE: 64 BPM | DIASTOLIC BLOOD PRESSURE: 83 MMHG | RESPIRATION RATE: 18 BRPM | OXYGEN SATURATION: 98 % | SYSTOLIC BLOOD PRESSURE: 145 MMHG

## 2019-10-09 RX ORDER — NICOTINE 21 MG/24HR
21 PATCH, TRANSDERMAL 24 HOURS TRANSDERMAL ONCE
Status: DISCONTINUED | OUTPATIENT
Start: 2019-10-09 | End: 2019-10-09 | Stop reason: HOSPADM

## 2019-10-09 RX ADMIN — NICOTINE 21 MG: 21 PATCH, EXTENDED RELEASE TRANSDERMAL at 05:58

## 2019-10-09 NOTE — ED NOTES
Pt  Requesting coffee & same given to pt    Emma Console Crisis worker, Vera Estevez, speaking w/ pt       Jan Bullock RN  10/09/19 9668

## 2019-10-09 NOTE — ED NOTES
Patient is requesting to go to [facility] for further assistance in getting into a rehab   Notified HOST of same

## 2019-10-09 NOTE — ED NOTES
Pt given food and drink @ bedside   Pt is currently resting with no other needs @ this time      [facility]  10/09/19 0912

## 2019-10-09 NOTE — ED NOTES
Pt  Sleeping but awakens to name   respiratory   Effort unlabored     Sarwat Kelley RN  10/08/19 2051

## 2019-10-09 NOTE — ED NOTES
Lacey Davis from HOST contacted crisis worker to inform that there is no update at this time from Tien  Pt continues to sleep on stretcher with no complaints

## 2019-10-09 NOTE — ED NOTES
Pt tearful, would not like to wait for HOST  He feels that he would do better in the [facility]         Oren Rebollar RN  10/09/19 7507

## 2019-10-09 NOTE — ED NOTES
Pt  Feeling like he need a cigarette    Dr Mari Griffin made aware      Keith Olmos, RN  10/09/19 9023

## 2019-10-16 ENCOUNTER — HOSPITAL ENCOUNTER (INPATIENT)
Facility: HOSPITAL | Age: 29
LOS: 1 days | Discharge: LEFT BEFORE DISCHARGE | DRG: 770 | End: 2019-10-17
Attending: EMERGENCY MEDICINE | Admitting: FAMILY MEDICINE
Payer: COMMERCIAL

## 2019-10-16 DIAGNOSIS — F32.A DEPRESSION: ICD-10-CM

## 2019-10-16 DIAGNOSIS — F11.10 OPIOID ABUSE (HCC): ICD-10-CM

## 2019-10-16 DIAGNOSIS — R56.9 SEIZURE (HCC): ICD-10-CM

## 2019-10-16 DIAGNOSIS — F19.10 POLYSUBSTANCE ABUSE (HCC): Primary | ICD-10-CM

## 2019-10-16 DIAGNOSIS — Z87.898 HISTORY OF SEIZURE: ICD-10-CM

## 2019-10-16 LAB
AMPHETAMINES SERPL QL SCN: NEGATIVE
BARBITURATES UR QL: NEGATIVE
BENZODIAZ UR QL: NEGATIVE
COCAINE UR QL: NEGATIVE
ETHANOL EXG-MCNC: 0 MG/DL
METHADONE UR QL: NEGATIVE
OPIATES UR QL SCN: POSITIVE
PCP UR QL: NEGATIVE
THC UR QL: POSITIVE

## 2019-10-16 PROCEDURE — 99285 EMERGENCY DEPT VISIT HI MDM: CPT

## 2019-10-16 PROCEDURE — 82075 ASSAY OF BREATH ETHANOL: CPT | Performed by: EMERGENCY MEDICINE

## 2019-10-16 PROCEDURE — 99284 EMERGENCY DEPT VISIT MOD MDM: CPT | Performed by: EMERGENCY MEDICINE

## 2019-10-16 PROCEDURE — 80307 DRUG TEST PRSMV CHEM ANLYZR: CPT | Performed by: EMERGENCY MEDICINE

## 2019-10-16 RX ORDER — DIPHENHYDRAMINE HCL 25 MG
50 TABLET ORAL ONCE
Status: COMPLETED | OUTPATIENT
Start: 2019-10-16 | End: 2019-10-16

## 2019-10-16 RX ADMIN — DIPHENHYDRAMINE HCL 50 MG: 25 TABLET ORAL at 19:47

## 2019-10-16 NOTE — ED NOTES
Call received from Kelli Regalado from HOST  A clinician will come to the ED after current assessment at another ED

## 2019-10-16 NOTE — ED PROVIDER NOTES
History  Chief Complaint   Patient presents with    Detox Evaluation     pt requesting us to get him a detox bed having suicidal thoughts with a plan to OD  Patient is a 72-year-old male, multiple frequent visits for polysubstance abuse  Returns emergency room today again requesting to be placed into a detox facility  He states to me that even though he had a documented place at [facility] the hospital from his previous visit a few days ago that he did not go there because the bed was not there  He admits to continuing to use heroin and cocaine and marijuana occasionally  Admits to injecting his heroin  States his last use was yesterday or possibly the day before that  He is otherwise without complaints here in the emergency room denying suicidal homicidal ideations  Prior to Admission Medications   Prescriptions Last Dose Informant Patient Reported? Taking? FLUoxetine HCl (PROZAC PO)   Yes No   Sig: Take by mouth   QUEtiapine (SEROquel) 200 mg tablet   Yes No   Sig: Take 200 mg by mouth daily at bedtime   divalproex sodium (DEPAKOTE) 500 mg EC tablet   Yes No   Sig: Take by mouth every 8 (eight) hours   nicotine (NICODERM CQ) 21 mg/24 hr TD 24 hr patch   Yes No   Sig: Place 1 patch on the skin every 24 hours      Facility-Administered Medications: None       Past Medical History:   Diagnosis Date    Anxiety     B[facility] disorder [facility] [address] [facility]     Depression     Intermittent explosive disorder     Psychiatric disorder     [facility]        Past Surgical History:   Procedure Laterality Date    LEG SURGERY         History reviewed  No pertinent family history  I have reviewed and agree with the history as documented      Social History     Tobacco Use    Smoking status: Current Every Day Smoker     Packs/day: 1 00     Years: 12 00     Pack years: 12 00     Types: Cigarettes    Smokeless tobacco: Never Used    Tobacco comment: " i don't know how many"   Substance Use Topics  Alcohol use: Not Currently    Drug use: Yes     Types: Methamphetamines, Prescription, Heroin, Marijuana     Comment: denies meth use currently        Review of Systems   Constitutional: Negative  Negative for chills and fever  HENT: Negative  Negative for rhinorrhea, sore throat, trouble swallowing and voice change  Eyes: Negative  Negative for pain and visual disturbance  Respiratory: Negative  Negative for cough, shortness of breath and wheezing  Cardiovascular: Negative  Negative for chest pain and palpitations  Gastrointestinal: Negative for abdominal pain, diarrhea, nausea and vomiting  Genitourinary: Negative  Negative for dysuria and frequency  Musculoskeletal: Negative  Negative for neck pain and neck stiffness  Skin: Negative  Negative for rash  Neurological: Negative  Negative for dizziness, speech difficulty, weakness, light-headedness and numbness  Physical Exam  Physical Exam   Constitutional: He is oriented to person, place, and time  He appears well-developed and well-nourished  No distress  HENT:   Head: Normocephalic and atraumatic  Mouth/Throat: Oropharynx is clear and moist    Eyes: Pupils are equal, round, and reactive to light  Conjunctivae and EOM are normal    Neck: Normal range of motion  Neck supple  No tracheal deviation present  Cardiovascular: Normal rate, regular rhythm and intact distal pulses  Pulmonary/Chest: Effort normal and breath sounds normal  No respiratory distress  He has no wheezes  He has no rales  Abdominal: Soft  Bowel sounds are normal  He exhibits no distension  There is no tenderness  There is no rebound and no guarding  Musculoskeletal: Normal range of motion  He exhibits no tenderness or deformity  Neurological: He is alert and oriented to person, place, and time  Skin: Skin is warm and dry  Capillary refill takes less than 2 seconds  No rash noted  Psychiatric: He has a normal mood and affect   His behavior is normal    Nursing note and vitals reviewed  Vital Signs  ED Triage Vitals [10/16/19 1617]   Temperature Pulse Respirations Blood Pressure SpO2   98 °F (36 7 °C) (!) 115 16 128/89 97 %      Temp Source Heart Rate Source Patient Position - Orthostatic VS BP Location FiO2 (%)   Tympanic Monitor Sitting Left arm --      Pain Score       No Pain           Vitals:    10/16/19 1617   BP: 128/89   Pulse: (!) 115   Patient Position - Orthostatic VS: Sitting         Visual Acuity      ED Medications  Medications - No data to display    Diagnostic Studies  Results Reviewed     Procedure Component Value Units Date/Time    POCT alcohol breath test [[id number]]  (Normal) Resulted:  10/16/19 1631    Lab Status:  Final result Updated:  10/16/19 1631     EXTBreath Alcohol 0 000    Rapid drug screen, urine [[id number]] Collected:  10/16/19 1631    Lab Status:  No result Specimen:  Urine, Other                  No orders to display              Procedures  Procedures       ED Course  ED Course as of Oct 16 2018   Wed Oct 16, 2019   1914 Patient screened by HOST                                   [facility]  Number of Diagnoses or Management Options  Diagnosis management comments: Patient is a 60-year-old male who presented for history exam as above  No obvious signs of abscess signs physical examination  There are track marks on his left arm  He had an undetectable alcohol level, awaiting UDS  Patient will be seen and evaluated again by HOST for rehabilitation  Patient is hemodynamically stable, awaiting evaluation by host for bed assignment, care will be transitioned to Dr Christian Feldman         Amount and/or Complexity of Data Reviewed  Clinical lab tests: ordered and reviewed  Tests in the radiology section of CPT®: ordered and reviewed        Disposition  Final diagnoses:   None     ED Disposition     None      Follow-up Information    None         Patient's Medications   Discharge Prescriptions    No medications on file     No discharge procedures on file      ED Provider  Electronically Signed by           Tor Garber DO  10/16/19 2020

## 2019-10-17 VITALS
HEIGHT: 66 IN | HEART RATE: 84 BPM | BODY MASS INDEX: 21.33 KG/M2 | OXYGEN SATURATION: 96 % | TEMPERATURE: 98.8 F | RESPIRATION RATE: 20 BRPM | WEIGHT: 132.72 LBS | DIASTOLIC BLOOD PRESSURE: 84 MMHG | SYSTOLIC BLOOD PRESSURE: 126 MMHG

## 2019-10-17 PROBLEM — Z72.0 TOBACCO ABUSE: Status: ACTIVE | Noted: 2019-10-17

## 2019-10-17 PROBLEM — F11.10 OPIOID ABUSE (HCC): Status: ACTIVE | Noted: 2019-10-17

## 2019-10-17 PROBLEM — F32.A DEPRESSION: Status: ACTIVE | Noted: 2019-10-17

## 2019-10-17 PROBLEM — Z87.898 HISTORY OF SEIZURE: Status: ACTIVE | Noted: 2019-10-17

## 2019-10-17 PROBLEM — Z59.00 HOMELESSNESS: Status: ACTIVE | Noted: 2019-10-17

## 2019-10-17 LAB
ALBUMIN SERPL BCP-MCNC: 4.8 G/DL (ref 3–5.2)
ALP SERPL-CCNC: 91 U/L (ref 43–122)
ALT SERPL W P-5'-P-CCNC: 38 U/L (ref 9–52)
ANION GAP SERPL CALCULATED.3IONS-SCNC: 9 MMOL/L (ref 5–14)
AST SERPL W P-5'-P-CCNC: 38 U/L (ref 17–59)
BASOPHILS # BLD AUTO: 0.1 THOUSANDS/ΜL (ref 0–0.1)
BASOPHILS NFR BLD AUTO: 1 % (ref 0–1)
BILIRUB SERPL-MCNC: 1.1 MG/DL
BUN SERPL-MCNC: 9 MG/DL (ref 5–25)
CALCIUM SERPL-MCNC: 10 MG/DL (ref 8.4–10.2)
CHLORIDE SERPL-SCNC: 105 MMOL/L (ref 97–108)
CO2 SERPL-SCNC: 26 MMOL/L (ref 22–30)
CREAT SERPL-MCNC: 0.81 MG/DL (ref 0.7–1.5)
EOSINOPHIL # BLD AUTO: 0.1 THOUSAND/ΜL (ref 0–0.4)
EOSINOPHIL NFR BLD AUTO: 1 % (ref 0–6)
ERYTHROCYTE [DISTWIDTH] IN BLOOD BY AUTOMATED COUNT: 13.3 %
ETHANOL SERPL-MCNC: <10 MG/DL (ref 0–10)
GFR SERPL CREATININE-BSD FRML MDRD: 121 ML/MIN/1.73SQ M
GLUCOSE SERPL-MCNC: 104 MG/DL (ref 70–99)
HCT VFR BLD AUTO: 39 % (ref 41–53)
HGB BLD-MCNC: 13.3 G/DL (ref 13.5–17.5)
LYMPHOCYTES # BLD AUTO: 1.5 THOUSANDS/ΜL (ref 0.5–4)
LYMPHOCYTES NFR BLD AUTO: 17 % (ref 25–45)
MAGNESIUM SERPL-MCNC: 2 MG/DL (ref 1.6–2.3)
MCH RBC QN AUTO: 31.4 PG (ref 26–34)
MCHC RBC AUTO-ENTMCNC: 34.2 G/DL (ref 31–36)
MCV RBC AUTO: 92 FL (ref 80–100)
MONOCYTES # BLD AUTO: 1 THOUSAND/ΜL (ref 0.2–0.9)
MONOCYTES NFR BLD AUTO: 11 % (ref 1–10)
NEUTROPHILS # BLD AUTO: 6.2 THOUSANDS/ΜL (ref 1.8–7.8)
NEUTS SEG NFR BLD AUTO: 70 % (ref 45–65)
PHOSPHATE SERPL-MCNC: 2.6 MG/DL (ref 2.5–4.8)
PLATELET # BLD AUTO: 252 THOUSANDS/UL (ref 150–450)
PMV BLD AUTO: 7 FL (ref 8.9–12.7)
POTASSIUM SERPL-SCNC: 3.8 MMOL/L (ref 3.6–5)
PROT SERPL-MCNC: 8.3 G/DL (ref 5.9–8.4)
RBC # BLD AUTO: 4.25 MILLION/UL (ref 4.5–5.9)
SODIUM SERPL-SCNC: 140 MMOL/L (ref 137–147)
TSH SERPL DL<=0.05 MIU/L-ACNC: 0.21 UIU/ML (ref 0.47–4.68)
VALPROATE SERPL-MCNC: <10 UG/ML (ref 50–120)
WBC # BLD AUTO: 8.8 THOUSAND/UL (ref 4.5–11)

## 2019-10-17 PROCEDURE — 85025 COMPLETE CBC W/AUTO DIFF WBC: CPT | Performed by: NURSE PRACTITIONER

## 2019-10-17 PROCEDURE — 99221 1ST HOSP IP/OBS SF/LOW 40: CPT | Performed by: PSYCHIATRY & NEUROLOGY

## 2019-10-17 PROCEDURE — 84443 ASSAY THYROID STIM HORMONE: CPT | Performed by: NURSE PRACTITIONER

## 2019-10-17 PROCEDURE — 80053 COMPREHEN METABOLIC PANEL: CPT | Performed by: NURSE PRACTITIONER

## 2019-10-17 PROCEDURE — 80164 ASSAY DIPROPYLACETIC ACD TOT: CPT | Performed by: NURSE PRACTITIONER

## 2019-10-17 PROCEDURE — 83735 ASSAY OF MAGNESIUM: CPT | Performed by: NURSE PRACTITIONER

## 2019-10-17 PROCEDURE — 80320 DRUG SCREEN QUANTALCOHOLS: CPT | Performed by: NURSE PRACTITIONER

## 2019-10-17 PROCEDURE — 99223 1ST HOSP IP/OBS HIGH 75: CPT | Performed by: INTERNAL MEDICINE

## 2019-10-17 PROCEDURE — 84100 ASSAY OF PHOSPHORUS: CPT | Performed by: NURSE PRACTITIONER

## 2019-10-17 PROCEDURE — 36415 COLL VENOUS BLD VENIPUNCTURE: CPT | Performed by: NURSE PRACTITIONER

## 2019-10-17 RX ORDER — LANOLIN ALCOHOL/MO/W.PET/CERES
3 CREAM (GRAM) TOPICAL ONCE
Status: COMPLETED | OUTPATIENT
Start: 2019-10-17 | End: 2019-10-17

## 2019-10-17 RX ORDER — ONDANSETRON 2 MG/ML
4 INJECTION INTRAMUSCULAR; INTRAVENOUS EVERY 8 HOURS PRN
Status: DISCONTINUED | OUTPATIENT
Start: 2019-10-17 | End: 2019-10-17 | Stop reason: HOSPADM

## 2019-10-17 RX ORDER — DIVALPROEX SODIUM 500 MG/1
500 TABLET, DELAYED RELEASE ORAL EVERY 8 HOURS SCHEDULED
Status: DISCONTINUED | OUTPATIENT
Start: 2019-10-18 | End: 2019-10-17

## 2019-10-17 RX ORDER — VALPROIC ACID 250 MG/5ML
500 SOLUTION ORAL ONCE
Status: COMPLETED | OUTPATIENT
Start: 2019-10-17 | End: 2019-10-17

## 2019-10-17 RX ORDER — DIVALPROEX SODIUM 250 MG/1
500 TABLET, DELAYED RELEASE ORAL EVERY 8 HOURS SCHEDULED
Status: DISCONTINUED | OUTPATIENT
Start: 2019-10-17 | End: 2019-10-17

## 2019-10-17 RX ORDER — SODIUM CHLORIDE 9 MG/ML
125 INJECTION, SOLUTION INTRAVENOUS CONTINUOUS
Status: DISCONTINUED | OUTPATIENT
Start: 2019-10-17 | End: 2019-10-17 | Stop reason: HOSPADM

## 2019-10-17 RX ORDER — LORAZEPAM 2 MG/ML
1 INJECTION INTRAMUSCULAR ONCE
Status: COMPLETED | OUTPATIENT
Start: 2019-10-17 | End: 2019-10-17

## 2019-10-17 RX ORDER — VALPROIC ACID 250 MG/1
500 CAPSULE, LIQUID FILLED ORAL ONCE
Status: DISCONTINUED | OUTPATIENT
Start: 2019-10-17 | End: 2019-10-17

## 2019-10-17 RX ORDER — LORAZEPAM 0.5 MG/1
0.5 TABLET ORAL ONCE
Status: COMPLETED | OUTPATIENT
Start: 2019-10-17 | End: 2019-10-17

## 2019-10-17 RX ORDER — ACETAMINOPHEN 325 MG/1
650 TABLET ORAL EVERY 6 HOURS PRN
Status: DISCONTINUED | OUTPATIENT
Start: 2019-10-17 | End: 2019-10-17 | Stop reason: HOSPADM

## 2019-10-17 RX ORDER — NICOTINE 21 MG/24HR
1 PATCH, TRANSDERMAL 24 HOURS TRANSDERMAL DAILY
Status: DISCONTINUED | OUTPATIENT
Start: 2019-10-17 | End: 2019-10-17 | Stop reason: HOSPADM

## 2019-10-17 RX ORDER — LORAZEPAM 1 MG/1
1 TABLET ORAL EVERY 8 HOURS PRN
Status: DISCONTINUED | OUTPATIENT
Start: 2019-10-17 | End: 2019-10-17 | Stop reason: HOSPADM

## 2019-10-17 RX ORDER — NICOTINE 21 MG/24HR
1 PATCH, TRANSDERMAL 24 HOURS TRANSDERMAL EVERY 24 HOURS
Status: DISCONTINUED | OUTPATIENT
Start: 2019-10-17 | End: 2019-10-17

## 2019-10-17 RX ORDER — DIVALPROEX SODIUM 500 MG/1
500 TABLET, DELAYED RELEASE ORAL EVERY 8 HOURS SCHEDULED
Status: DISCONTINUED | OUTPATIENT
Start: 2019-10-18 | End: 2019-10-17 | Stop reason: HOSPADM

## 2019-10-17 RX ORDER — CLONIDINE HYDROCHLORIDE 0.1 MG/1
0.1 TABLET ORAL EVERY 12 HOURS SCHEDULED
Status: DISCONTINUED | OUTPATIENT
Start: 2019-10-17 | End: 2019-10-17 | Stop reason: HOSPADM

## 2019-10-17 RX ADMIN — VALPROIC ACID 500 MG: 500 SOLUTION ORAL at 01:59

## 2019-10-17 RX ADMIN — ENOXAPARIN SODIUM 30 MG: 30 INJECTION SUBCUTANEOUS at 08:32

## 2019-10-17 RX ADMIN — LORAZEPAM 1 MG: 2 INJECTION INTRAMUSCULAR at 01:01

## 2019-10-17 RX ADMIN — ACETAMINOPHEN 650 MG: 325 TABLET ORAL at 08:44

## 2019-10-17 RX ADMIN — SODIUM CHLORIDE 125 ML/HR: 0.9 INJECTION, SOLUTION INTRAVENOUS at 01:07

## 2019-10-17 RX ADMIN — SODIUM CHLORIDE 125 ML/HR: 0.9 INJECTION, SOLUTION INTRAVENOUS at 07:27

## 2019-10-17 RX ADMIN — LORAZEPAM 1 MG: 1 TABLET ORAL at 11:38

## 2019-10-17 RX ADMIN — MELATONIN TAB 3 MG 3 MG: 3 TAB at 01:59

## 2019-10-17 RX ADMIN — ONDANSETRON 4 MG: 2 INJECTION, SOLUTION INTRAMUSCULAR; INTRAVENOUS at 01:03

## 2019-10-17 RX ADMIN — LORAZEPAM 0.5 MG: 0.5 TABLET ORAL at 08:44

## 2019-10-17 NOTE — ED NOTES
Crisis worker discuss with ED doctor if patient could be placed medically because patient has a history of seizers that only minimally documented  Patient was denied from [place], admission stated their doctor left a note stating a loading dose of Keppra would be needed  ED doctor stated that would not be in the best interest for the patients treatment needs due to also dealing with withdraw currently because of the extreme mood swings starting Keppra could occur  Crisis worker called [facility] back asking if the doctor continued the Depakote dosage to therapeutic level if they would accept him  Also adding that the patient stated he was documented suicidal  They stated they would only accept him with Keppra being started  ED doctor contacting medical for admission to be stabilized for seizure/medication/withdraw issues, consult psych to clear him to again be a HOST referral if needed or admitted directly to psych

## 2019-10-17 NOTE — ASSESSMENT & PLAN NOTE
· Presents to the emergency department tonight requesting detox from drug use  · States he uses dope regularly  · Last use was approximately 4 bags prior to arriving to ER  · Denies further concurrent drug use or alcohol abuse  · Urine drug screen positive for opioids and THC  · Was seen in the emergency department by case management for possible admission to detox facility, however given his remote history of seizures was declined for immediate admission  · On admission, patient again states wish to receive detox treatment  · Admit to [facility] for acute opioid withdrawal  · P r n  Tylenol for mild pain and headaches  · Clonidine 0 1 mg p o  q 12 hours for opioid withdrawal  · Zofran 4 mg IV Q 8 hours p r n   Nausea, vomiting  · Baseline labs ordered
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
## 2019-10-17 NOTE — NURSING NOTE
Pt signed out AMA  IV removed, tele removed  Patient made aware by RN and MD of possible risks and complications of leaving AMA  Patient made aware that if he needs medical advice he may come back to the ER  Patient left dressed in street clothes

## 2019-10-17 NOTE — ASSESSMENT & PLAN NOTE
· Presents to the emergency department tonight requesting detox from drug use  · States he uses dope regularly  · Last use was approximately 4 bags prior to arriving to ER  · Denies further concurrent drug use or alcohol abuse  · Urine drug screen positive for opioids and THC  · Was seen in the emergency department by case management for possible admission to detox facility, however given his remote history of seizures was declined for immediate admission  · On admission, patient again states wish to receive detox treatment  · Admit to [facility] for acute opioid withdrawal  · P r n  Tylenol for mild pain and headaches  · Clonidine 0 1 mg p o  q 12 hours for opioid withdrawal  · Zofran 4 mg IV Q 8 hours p r n   Nausea, vomiting  · Psych consult pending patient is interested to go for rehab

## 2019-10-17 NOTE — PLAN OF CARE
Problem: Potential for Falls  Goal: Patient will remain free of falls  Description  INTERVENTIONS:  - Assess patient frequently for physical needs  -  Identify cognitive and physical deficits and behaviors that affect risk of falls    -  Mercer fall precautions as indicated by assessment   - Educate patient/family on patient safety including physical limitations  - Instruct patient to call for assistance with activity based on assessment  - Modify environment to reduce risk of injury  - Consider OT/PT consult to assist with strengthening/mobility  Outcome: Progressing     Problem: PAIN - ADULT  Goal: Verbalizes/displays adequate comfort level or baseline comfort level  Description  Interventions:  - Encourage patient to monitor pain and request assistance  - Assess pain using appropriate pain scale  - Administer analgesics based on type and severity of pain and evaluate response  - Implement non-pharmacological measures as appropriate and evaluate response  - Consider cultural and social influences on pain and pain management  - Notify physician/advanced practitioner if interventions unsuccessful or patient reports new pain  Outcome: Progressing     Problem: INFECTION - ADULT  Goal: Absence or prevention of progression during hospitalization  Description  INTERVENTIONS:  - Assess and monitor for signs and symptoms of infection  - Monitor lab/diagnostic results  - Monitor all insertion sites, i e  indwelling lines, tubes, and drains  - Monitor endotracheal if appropriate and nasal secretions for changes in amount and color  - Mercer appropriate cooling/warming therapies per order  - Administer medications as ordered  - Instruct and encourage patient and family to use good hand hygiene technique  - Identify and instruct in appropriate isolation precautions for identified infection/condition  Outcome: Progressing  Goal: Absence of fever/infection during neutropenic period  Description  INTERVENTIONS:  - Monitor WBC    Outcome: Progressing     Problem: SAFETY ADULT  Goal: Patient will remain free of falls  Description  INTERVENTIONS:  - Assess patient frequently for physical needs  -  Identify cognitive and physical deficits and behaviors that affect risk of falls    -  Lawson fall precautions as indicated by assessment   - Educate patient/family on patient safety including physical limitations  - Instruct patient to call for assistance with activity based on assessment  - Modify environment to reduce risk of injury  - Consider OT/PT consult to assist with strengthening/mobility  Outcome: Progressing  Goal: Maintain or return to baseline ADL function  Description  INTERVENTIONS:  -  Assess patient's ability to carry out ADLs; assess patient's baseline for ADL function and identify physical deficits which impact ability to perform ADLs (bathing, care of mouth/teeth, toileting, grooming, dressing, etc )  - Assess/evaluate cause of self-care deficits   - Assess range of motion  - Assess patient's mobility; develop plan if impaired  - Assess patient's need for assistive devices and provide as appropriate  - Encourage maximum independence but intervene and supervise when necessary  - Involve family in performance of ADLs  - Assess for home care needs following discharge   - Consider OT consult to assist with ADL evaluation and planning for discharge  - Provide patient education as appropriate  Outcome: Progressing  Goal: Maintain or return mobility status to optimal level  Description  INTERVENTIONS:  - Assess patient's baseline mobility status (ambulation, transfers, stairs, etc )    - Identify cognitive and physical deficits and behaviors that affect mobility  - Identify mobility aids required to assist with transfers and/or ambulation (gait belt, sit-to-stand, lift, walker, cane, etc )  - Lawson fall precautions as indicated by assessment  - Record patient progress and toleration of activity level on Mobility SBAR; progress patient to next Phase/Stage  - Instruct patient to call for assistance with activity based on assessment  - Consider rehabilitation consult to assist with strengthening/weightbearing, etc   Outcome: Progressing     Problem: DISCHARGE PLANNING  Goal: Discharge to home or other facility with appropriate resources  Description  INTERVENTIONS:  - Identify barriers to discharge w/patient and caregiver  - Arrange for needed discharge resources and transportation as appropriate  - Identify discharge learning needs (meds, wound care, etc )  - Arrange for interpretive services to assist at discharge as needed  - Refer to Case Management Department for coordinating discharge planning if the patient needs post-hospital services based on physician/advanced practitioner order or complex needs related to functional status, cognitive ability, or social support system  Outcome: Progressing     Problem: Knowledge Deficit  Goal: Patient/family/caregiver demonstrates understanding of disease process, treatment plan, medications, and discharge instructions  Description  Complete learning assessment and assess knowledge base    Interventions:  - Provide teaching at level of understanding  - Provide teaching via preferred learning methods  Outcome: Progressing

## 2019-10-17 NOTE — NURSING NOTE
Received pt this evening  Pt is AAO x4 and pt has complaints of generalized pain at this time  Pt has no edema and has palpable pulses in all extremities  Pt lung sounds are clear and heart tones are regular  One to one observation remains in place  Bed is low and locked, call bell is in reach, will continue to monitor   Elena Jackson RN

## 2019-10-17 NOTE — CONSULTS
Psychiatric Evaluation - Behavioral Health     Identification Data:Elias Rivera 30 y o  male MRN: [id number]    Chief Complaint: I want to get out of here  History of present illness:  Patient is a 31 yo male who was last night due to Opioid withdrawal, was using Heroin 10-20 bags IV per day, for the past year, last use was 2 days ago, has been abusing heroin for many years, has been in 3 or 4 rehab programs  Has had brief periods of abstinence during this time  Has been on Depakote, Prozac, Seroquel, Benadryl in recent past, not prior to admission though  Reports having suicidal thoughts yesterday but not today  He wants to leave to recover his belongings  He has limited family contacts and has been homeless for the past year  He reports a long history of depression, Bipolar D/O  He denies A/V thakkar, denies paranoia or delusional ideas  Having difficulty with sleep and appetite due to withdrawal symptoms  Psychiatric Review Of Systems:  Change in sleep: yes  Appetite changes: yes  Weight changes: no  Change in energy/anergy: yes  Change in interest/pleasure/anhedonia: yes  Somatic symptoms: no  Anxiety/panic: yes  Manic symptoms: no  Guilt feelings:no  Hopeless: yes, often feels hopeless  Self injurious behavior/risky behavior: yes, heroin use    Historical Information      Long h/o of Opioid use disorder    Past Psychiatric History:   Has been treated for mood and Bipolar D/O since adolescence  Not sure if he was hospitalized      Substance Abuse History:  See HPI, for Opioids, has been to rehab 3 or 4 times, no outpatient treatment, no other drug or alcohol use disorder    Past Medical History  None    Family Psychiatric History:   Mulitple family members with Bipolar d/o and schizopohrenia    Social History:  Developmental:  Education: no high school  Marital history: single  Living arrangement, social support: the patient is homeless  Occupational History: unemployed  Access to firearms: none      ALLERGIES     Mental Status Evaluation:  Appearance:  {Marginal/poor hygiene   Behavior:  restless and fidgety   Speech:   Language Increased latency of response and Normal volume  Able to name objects and Able to repeat phrases   Mood:  irritable, anxious and depressed   Affect: Thought process blunted  Goal directed and coherent   Associations: Tightly connected   Thought Content:  Does not verbalize delusional material   Perceptual Disturbances: Denies hallucinations and does not appear to be responding to internal stimuli   Risk Potential: No suicidal or homicidal ideation   Orientation  Oriented x 3   Memory grossly intact   Attention/Concentration attention span appeared shorter than expected for age   Fund of knowledge Diminished   Insight:  No insight   Judgment: Poor judgment   Gait/Station: normal gait/station and normal balance   Motor Activity: No abnormal movement noted             Assessment/Plan     Principal Problem:    Opioid abuse (HCC)  Active Problems:    History of seizure    Tobacco abuse    Depression    Homelessness  Bipolar D/O depressed  Plan:   Would resume Prozac 20mg daily for mood, if he would be staying in hospital  Not willing to go to rehab at present time  Discussed need for inpatient/outpatient mental health or substance treatment  Not verbalizing any SI/HI or psychosis  Will discuss with other colleagues  Cleared for discharge  After completing consult, was made aware that another colleague had already seen him already  Risks, benefits and possible side effects of Medications:   No meds needed

## 2019-10-17 NOTE — ED NOTES
Upon crisis arrival was told patient was a HOST referral  Crisis questioned why he was on 1:1  At triage patient stated he was suicidal with a plan to overdose  Patient told the nurse a few times if he was released he would kill himself  This crisis worker called HOST to touch base about the patient  They stated they question his status because the patient said he was suicidal to them  HOST said if it is chart that the patient has a 1:1 and is suicidal they can not place him  Crisis asked HOST if there was a reason he has not been able to be placed previous they stated because he is homeless they have been unable to get a hold of him when he is discharged to follow up  HOST stated he was denied from [facility] because of seizure history  Patient stated that he has a history of seizures induced from stress  Patient stated he didn't know when his last seizure was, when crisis asked if he could guess, weeks, months or years, patient stated "weeks"  Patient is sleeping and give little input  Patient appears to be going through withdraw currently

## 2019-10-17 NOTE — PLAN OF CARE
Problem: DISCHARGE PLANNING - CARE MANAGEMENT  Goal: Discharge to post-acute care or home with appropriate resources  Description  INTERVENTIONS:  - Conduct assessment to determine patient/family and health care team treatment goals, and need for post-acute services based on payer coverage, community resources, and patient preferences, and barriers to discharge  - Address psychosocial, clinical, and financial barriers to discharge as identified in assessment in conjunction with the patient/family and health care team  - Arrange appropriate level of post-acute services according to patients   needs and preference and payer coverage in collaboration with the physician and health care team  - Communicate with and update the patient/family, physician, and health care team regarding progress on the discharge plan  - Arrange appropriate transportation to post-acute venues  - CM will assist with placement either to drug rehab or to inpatient psych based on psych recommendation     Outcome: Progressing

## 2019-10-17 NOTE — PROGRESS NOTES
Progress Note - [place] 1990, 30 y o  male MRN: [id number]    Unit/Bed#: 7T [facility] 717-01 Encounter: [id number]    Primary Care Provider: Sierra Rees MD   Date and time admitted to hospital: 10/16/2019  4:18 PM        Depression  Assessment & Plan  · Reportedly told ER staff he had suicidal ideations with a plan to overdose on presentation  · Denies suicidality on H&P interview  · Psych consult placed  · Seen by psychiatrist yet to be discharged patient in explained that he has no more wanted to hurt himself wants to go home  · Patient alert oriented psych clear to be discharged  · Discussed the patient patient insisted to leave right away signed signed AMA and left    History of seizure  Assessment & Plan  · Remote history of seizures per patient and chart recall  · EMR states the patient takes Depakote q 8 hours, however the patient states he has not had refill on medication in some time due to lack of follow-up  · States his last seizure was likely months ago, denies an aura prior to seizure activity  · No EEG on file, does not follow with a neurologist  · No seizure-like activity noted in the emergency department  · Seizure precautions  · Neuro checks Q shift  · Restart home Depakote 500 mg p o  Q 8 hours, 1 dose given on admission                        Discharging Physician / Practitioner: Antonia Webb MD  PCP: Sierra Rees MD  Admission Date:   Admission Orders (From admission, onward)     Ordered        10/17/19 0033  Inpatient Admission (expected length of stay for this patient Order details is greater than two midnights)  Once                   Discharge Date: 10/17/19    Resolved Problems  Date Reviewed: 10/17/2019    None          Consultations During Hospital Stay:  · Signed AMA and left    ·     Significant Findings / Test Results:   ·     Incidental Findings:   ·     Test Results Pending at Discharge (will require follow up):   ·      Outpatient Tests Requested:  ·     Complications:      Reason for Admission:  Drug overdose    Hospital Course:     Greg Camargo is a 30 y o  male patient who originally presented to the hospital on 10/16/2019 due to patient does not want to stay seen by Psychiatry no further suicidal idea patient signed AMA and  Signed AMA and left  Please see above list of diagnoses and related plan for additional information  Condition at Discharge: fair     Discharge Day Visit / Exam:     Subjective:  Patient alert oriented try to explain but does not want to stay patient signed AMA and  Vitals: Blood Pressure: 126/84 (10/17/19 1155)  Pulse: 84 (10/17/19 1155)  Temperature: 98 8 °F (37 1 °C) (10/17/19 0728)  Temp Source: Temporal (10/17/19 0728)  Respirations: 20 (10/17/19 0728)  Height: 5' 6" (167 6 cm) (10/17/19 0153)  Weight - Scale: 60 2 kg (132 lb 11 5 oz) (10/17/19 0600)  SpO2: 96 % (10/17/19 0728)  Exam:   Physical Exam    Discussion with Family:  Unable to reach no family member available    Discharge instructions/Information to patient and family:   See after visit summary for information provided to patient and family  Provisions for Follow-Up Care:  See after visit summary for information related to follow-up care and any pertinent home health orders  Disposition:     Other: Signed AMA and left    For Discharges to [facility]:   · Not Applicable to this Patient - Not Applicable to this Patient    Planned Readmission:      Discharge Statement:  I spent 35 minutes minutes discharging the patient  This time was spent on the day of discharge  I had direct contact with the patient on the day of discharge  Greater than 50% of the total time was spent examining patient, answering all patient questions, arranging and discussing plan of care with patient as well as directly providing post-discharge instructions  Additional time then spent on discharge activities      Discharge Medications:  See after visit summary for reconciled discharge medications provided to patient and family        ** Please Note: This note has been constructed using a voice recognition system **

## 2019-10-17 NOTE — UTILIZATION REVIEW
Initial Clinical Review    Admission: Date/Time/Statement: Inpatient Admission Orders (From admission, onward)     Ordered        10/17/19 0033  Inpatient Admission (expected length of stay for this patient Order details is greater than two midnights)  Once                   Orders Placed This Encounter   Procedures    Inpatient Admission (expected length of stay for this patient Order details is greater than two midnights)     Standing Status:   Standing     Number of Occurrences:   1     Order Specific Question:   Admitting Physician     Answer:   Anayeli Canales [[id number]]     Order Specific Question:   Level of Care     Answer:   Med Surg [16]     Order Specific Question:   Estimated length of stay     Answer:   More than 2 Midnights     Order Specific Question:   Certification     Answer:   I certify that inpatient services are medically necessary for this patient for a duration of greater than two midnights  See H&P and MD Progress Notes for additional information about the patient's course of treatment  ED Arrival Information     Expected Arrival Acuity Means of Arrival Escorted By Service Admission Type    - 10/16/2019 15:52 [facility] Emergency    [address]         Chief Complaint   Patient presents with    Detox Evaluation     pt requesting us to get him a detox bed having suicidal thoughts with a plan to OD  Assessment/Plan:30year old male to the ED from home for detox evaluation prior to being admitted to detox facility  CUrrently suicidal   Admitted to inpatient for opioid abuse, seizure history  Currently homeless coming in to the ED to request detox  H/O seizures and noncompliance with seizure meds  HE uses multiple drugs  Loaded with depakote in the ED   1:1 observation in place  Seen by crisis in ED  Seen by outpatient  for detox facilities in the ED  Due to remote h/o seizures, he was denied to detox facility    Baseline labs ordered  Clonidine for opioid withdrawal   Flat affect, unkempt with chills     ED Triage Vitals [10/16/19 1617]   Temperature Pulse Respirations Blood Pressure SpO2   98 °F (36 7 °C) (!) 115 16 128/89 97 %      Temp Source Heart Rate Source Patient Position - Orthostatic VS BP Location FiO2 (%)   Tympanic Monitor Sitting Left arm --      Pain Score       No Pain        Wt Readings from Last 1 Encounters:   10/17/19 60 2 kg (132 lb 11 5 oz)     Additional Vital Signs:   Date/Time  Temp  Pulse  Resp  BP  SpO2  O2 Device  Patient Position - Orthostatic VS   10/17/19 1155  --  84  --  126/84  --  --  Sitting   10/17/19 0832  --  --  --  110/60  --  --  --   10/17/19 0728  98 8 °F (37 1 °C)  87  20  112/78  96 %  None (Room air)  Lying   10/17/19 0153  98 6 °F (37 °C)  70  16  106/81  96 %  None (Room air)  Lying   10/17/19 0145  --  --  --  105/50  --  --  --   10/17/19 0119  --  86  16  116/65  98 %  None (Room air)  Lying   10/17/19 0014  --  74  20  105/50  97 %  None (Room air)  Lying   10/16/19 1949  --  84  14  138/83             Results from last 7 days   Lab Units 10/17/19  0052   WBC Thousand/uL 8 80   HEMOGLOBIN g/dL 13 3*   HEMATOCRIT % 39 0*   PLATELETS Thousands/uL 252   NEUTROS ABS Thousands/µL 6 20         Results from last 7 days   Lab Units 10/17/19  0052   SODIUM mmol/L 140   POTASSIUM mmol/L 3 8   CHLORIDE mmol/L 105   CO2 mmol/L 26   ANION GAP mmol/L 9   BUN mg/dL 9   CREATININE mg/dL 0 81   EGFR ml/min/1 73sq m 121   CALCIUM mg/dL 10 0   MAGNESIUM mg/dL 2 0   PHOSPHORUS mg/dL 2 6     Results from last 7 days   Lab Units 10/17/19  0052   AST U/L 38   ALT U/L 38   ALK PHOS U/L 91   TOTAL PROTEIN g/dL 8 3   ALBUMIN g/dL 4 8   TOTAL BILIRUBIN mg/dL 1 10         Results from last 7 days   Lab Units 10/17/19  0052   GLUCOSE RANDOM mg/dL 104*         Results from last 7 days   Lab Units 10/17/19  0052   TSH 3RD GENERATON uIU/mL 0 209*       Results from last 7 days   Lab Units 10/16/19  1631   AMPH/METH Negative   BARBITURATE UR  Negative   BENZODIAZEPINE UR  Negative   COCAINE UR  Negative   METHADONE URINE  Negative   OPIATE UR  Positive*   PCP UR  Negative   THC UR  Positive*     Results from last 7 days   Lab Units 10/17/19  0052   ETHANOL LVL mg/dL <10     ED Treatment:   Medication Administration from 10/16/2019 1552 to 10/17/2019 0143       Date/Time Order Dose Route Action     10/16/2019 1947 diphenhydrAMINE (BENADRYL) tablet 50 mg 50 mg Oral Given     10/17/2019 0103 ondansetron (ZOFRAN) injection 4 mg 4 mg Intravenous Given     10/17/2019 0101 LORazepam (ATIVAN) 2 mg/mL injection 1 mg 1 mg Intravenous Given     10/17/2019 0107 sodium chloride 0 9 % infusion 125 mL/hr Intravenous New Bag        Past Medical History:   Diagnosis Date    Anxiety     Bipolar disorder ([facility] )     Depression     Intermittent explosive disorder     Psychiatric disorder     Schizo-affective schizophrenia ([place] [id number] )        Admitting Diagnosis: Seizure ([place] [address] ) [R56 9]  Depression [F32 9]  Opioid abuse ([place] [address] ) [F11 10]  Status post alcohol detoxification [Z09]  Polysubstance abuse ([place] [address] ) [F19 10]  History of seizure [Z87 898]  Age/Sex: 30 y o  male  Admission Orders:  SCDs  Tele  UP and OOB  Continual observation  Medications:  cloNIDine 0 1 mg Oral Q12H [address]   [START ON 10/18/2019] divalproex sodium 500 mg Oral Q8H [address]   enoxaparin 30 mg Subcutaneous Q24H [address]   nicotine 1 patch Transdermal Daily       sodium chloride 125 mL/hr Intravenous Continuous       acetaminophen 650 mg Oral Q6H PRN   LORazepam 1 mg Oral Q8H PRN   ondansetron 4 mg Intravenous Q8H PRN       IP CONSULT TO PSYCHIATRY  IP CONSULT TO CASE MANAGEMENT    Network Utilization Review Department  Phone: [phone]; Fax [phone]  [email or web address]  ATTENTION: Please call with any questions or concerns to [phone]  and carefully listen to the prompts so that you are directed to the right person     Send all requests for admission clinical reviews, approved or denied determinations and any other requests to fax [phone]   All voicemails are confidential

## 2019-10-17 NOTE — SOCIAL WORK
LOS 0 GMLOS none  Patient is not bundled or a 30 day readmission    Patient presented to ER requesting detox from drug use  Per Crisis ER notes,  HOST was in to complete assessment for drug rehab  [facility] would not accept patient unless loading dose Keppra medication was given due to hx of seizures  Patient is currently a 1:1 due to threatening suicide, awaiting Psych consultation at present  CM in to see patient for assessment/ consultation  Patient was sleepy then woke up and became  agitated during assessment, stating he wanted to leave the hospital, if he doesn't go to Rehab today then he will be walking out  Patient is homeless, he states he has no friends and he does not have any contact with his sister, Damon Ivory who is listed as his primary contact  Patient is IADLS, does not use any DME  Patient is not  and does not have any children  Pt does not have a living will and he is not a   Patient states his PCP is Dr Hari Rolle and Pharmacy is [facility] on [facility]  Pt receives SSI as source of income, he is not employed  Pt denies any legal issues  Patient substances of choice are opiates and marijuana  He has a hx of depression, does not take any medication  Patient states he has a 's license, does not drive  CM will continue to follow D/C POC

## 2019-10-17 NOTE — ASSESSMENT & PLAN NOTE
· Reportedly told ER staff he had suicidal ideations with a plan to overdose on presentation  · Denies suicidality on H&P interview  · Psych consult placed  · Seen by psychiatrist yet to be discharged patient in explained that he has no more wanted to hurt himself wants to go home  · Patient alert oriented psych clear to be discharged  · Discussed the patient patient insisted to leave right away signed signed AMA and left

## 2019-10-17 NOTE — PROGRESS NOTES
Progress Note - Janessa Cottrell 1990, 30 y o  male MRN: [id number]    Unit/Bed#: 7T [facility] 717-01 Encounter: [id number]    Primary Care Provider: Dalia Rene MD   Date and time admitted to hospital: 10/16/2019  4:18 PM        Homelessness  Assessment & Plan  · States he is currently homeless and is not currently working  · Admits to having a sister in the area    [address]  · Reportedly told ER staff he had suicidal ideations with a plan to overdose on presentation  · Denies suicidality on H&P interview  · Psych consult placed    Tobacco abuse  Assessment & Plan  · Nicotine patch, cessation counseling    History of seizure  Assessment & Plan  · Remote history of seizures per patient and chart recall  · EMR states the patient takes Depakote q 8 hours, however the patient states he has not had refill on medication in some time due to lack of follow-up  · States his last seizure was likely months ago, denies an aura prior to seizure activity  · No EEG on file, does not follow with a neurologist  · No seizure-like activity noted in the emergency department  · Seizure precautions  · Neuro checks Q shift  · Restart home Depakote 500 mg p o  Q 8 hours, 1 dose given on admission    * Opioid abuse ([facility] [address] )  Assessment & Plan  · Presents to the emergency department tonight requesting detox from drug use  · States he uses dope regularly  · Last use was approximately 4 bags prior to arriving to ER  · Denies further concurrent drug use or alcohol abuse  · Urine drug screen positive for opioids and THC  · Was seen in the emergency department by case management for possible admission to detox facility, however given his remote history of seizures was declined for immediate admission  · On admission, patient again states wish to receive detox treatment  · Admit to [facility] for acute opioid withdrawal  · P r n   Tylenol for mild pain and headaches  · Clonidine 0 1 mg p o  q 12 hours for opioid withdrawal  · Zofran 4 mg IV Q 8 hours p r n  Nausea, vomiting  · Psych consult pending patient is interested to go for rehab                        VTE Pharmacologic Prophylaxis:   Pharmacologic: Enoxaparin (Lovenox)  Mechanical VTE Prophylaxis in Place: No    Patient Centered Rounds: I have performed bedside rounds with nursing staff today  Discussions with Specialists or Other Care Team Provider:  None    Education and Discussions with Family / Patient:  Patient    Time Spent for Care: 20 minutes  More than 50% of total time spent on counseling and coordination of care as described above  Current Length of Stay: 0 day(s)    Current Patient Status: Inpatient   Certification Statement: The patient will continue to require additional inpatient hospital stay due to Will need inpatient psych treatment    Discharge Plan: To rehab and psych    Code Status: Level 1 - Full Code      Subjective:   Very restless wants to go inpatient rehab    Objective:     Vitals:   Temp (24hrs), Av 5 °F (36 9 °C), Min:98 °F (36 7 °C), Max:98 8 °F (37 1 °C)    Temp:  [98 °F (36 7 °C)-98 8 °F (37 1 °C)] 98 8 °F (37 1 °C)  HR:  [] 87  Resp:  [14-20] 20  BP: (105-138)/(50-89) 110/60  SpO2:  [96 %-98 %] 96 %  Body mass index is 21 42 kg/m²  Input and Output Summary (last 24 hours): Intake/Output Summary (Last 24 hours) at 10/17/2019 0912  Last data filed at 10/17/2019 0006  Gross per 24 hour   Intake 360 ml   Output --   Net 360 ml       Physical Exam:     Physical Exam   Constitutional: He appears well-developed and well-nourished  HENT:   Head: Normocephalic and atraumatic  Right Ear: External ear normal    Left Ear: External ear normal    Mouth/Throat: Oropharynx is clear and moist    Eyes: Pupils are equal, round, and reactive to light  Conjunctivae and EOM are normal    Neck: Normal range of motion  Neck supple  Cardiovascular: Normal rate, regular rhythm, normal heart sounds and intact distal pulses  Pulmonary/Chest: Effort normal and breath sounds normal    Abdominal: Soft  Bowel sounds are normal  He exhibits no mass  There is no tenderness  There is no rebound and no guarding  Genitourinary:   Genitourinary Comments: deferred   Musculoskeletal: Normal range of motion  Neurological: He is alert  He has normal reflexes  Cranial nerves 2-12 are normal   Normal neurological exam  With very restless and jittery   Skin: Skin is warm and dry  No rash noted  Psychiatric: He has a normal mood and affect  Nursing note and vitals reviewed  Additional Data:     Labs:    Results from last 7 days   Lab Units 10/17/19  0052   WBC Thousand/uL 8 80   HEMOGLOBIN g/dL 13 3*   HEMATOCRIT % 39 0*   PLATELETS Thousands/uL 252   NEUTROS PCT % 70*   LYMPHS PCT % 17*   MONOS PCT % 11*   EOS PCT % 1     Results from last 7 days   Lab Units 10/17/19  0052   SODIUM mmol/L 140   POTASSIUM mmol/L 3 8   CHLORIDE mmol/L 105   CO2 mmol/L 26   BUN mg/dL 9   CREATININE mg/dL 0 81   ANION GAP mmol/L 9   CALCIUM mg/dL 10 0   ALBUMIN g/dL 4 8   TOTAL BILIRUBIN mg/dL 1 10   ALK PHOS U/L 91   ALT U/L 38   AST U/L 38   GLUCOSE RANDOM mg/dL 104*                           * I Have Reviewed All Lab Data Listed Above  * Additional Pertinent Lab Tests Reviewed: All Labs For Current Hospital Admission Reviewed    Imaging:    Imaging Reports Reviewed Today Include:  None today  Imaging Personally Reviewed by Myself Includes:   Old reviewed    Recent Cultures (last 7 days):           Last 24 Hours Medication List:     Current Facility-Administered Medications:  acetaminophen 650 mg Oral Q6H PRN COURTNEY Deluna    cloNIDine 0 1 mg Oral Q12H [address] COURTNEY Deluna    [START ON 10/18/2019] divalproex sodium 500 mg Oral [facility] COURTNEY Deluna    enoxaparin 30 mg Subcutaneous Q24H [address] COURTNEY Deluna    LORazepam 1 mg Oral Q8H PRN Eve Mclaughlin MD    nicotine 1 patch Transdermal Daily COURTNEY Deluna    ondansetron 4 mg Intravenous Q8H PRN Bowie Ache, CRNP    sodium chloride 125 mL/hr Intravenous Continuous Nael Pontiff III, DO Last Rate: 125 mL/hr (10/17/19 3780)        Today, Patient Was Seen By: Kimberlee Barton MD    ** Please Note: Dictation voice to text software may have been used in the creation of this document   **

## 2019-10-17 NOTE — NURSING NOTE
Patient denies SI upon ronds at this time  Stated he did have SI upon admission  Patient has complaints of feeling "hot and then cold" and anxiety as well as restlessness  Telemetry shows NSR  No complaints of chest pain or nausea and vomiting  1:1 present in room  Call bell in reach, will monitor

## 2019-10-17 NOTE — ASSESSMENT & PLAN NOTE
· Reportedly told ER staff he had suicidal ideations with a plan to overdose on presentation  · Denies suicidality on H&P interview  · Psych consult placed

## 2019-10-17 NOTE — ASSESSMENT & PLAN NOTE
· States he is currently homeless and is not currently working  · Admits to having a sister in the area

## 2019-10-17 NOTE — ASSESSMENT & PLAN NOTE
· Remote history of seizures per patient and chart recall  · EMR states the patient takes Depakote q 8 hours, however the patient states he has not had refill on medication in some time due to lack of follow-up  · States his last seizure was likely months ago, denies an aura prior to seizure activity  · No EEG on file, does not follow with a neurologist  · No seizure-like activity noted in the emergency department  · Seizure precautions  · Neuro checks Q shift  · Restart home Depakote 500 mg p o  Q 8 hours, 1 dose given on admission

## 2019-10-17 NOTE — H&P
H&P- Ro Minaya 1990, 30 y o  male MRN: [id number]    Unit/Bed#: 7T [facility] 717-01 Encounter: [id number]    Primary Care Provider: Aletha Holder MD   Date and time admitted to hospital: 10/16/2019  4:18 PM        * Opioid abuse [facility])  Assessment & Plan  · Presents to the emergency department tonight requesting detox from drug use  · States he uses dope regularly  · Last use was approximately 4 bags prior to arriving to ER  · Denies further concurrent drug use or alcohol abuse  · Urine drug screen positive for opioids and THC  · Was seen in the emergency department by case management for possible admission to detox facility, however given his remote history of seizures was declined for immediate admission  · On admission, patient again states wish to receive detox treatment  · Admit to [facility] for acute opioid withdrawal  · P r n  Tylenol for mild pain and headaches  · Clonidine 0 1 mg p o  q 12 hours for opioid withdrawal  · Zofran 4 mg IV Q 8 hours p r n   Nausea, vomiting  · Baseline labs ordered    History of seizure  Assessment & Plan  · Remote history of seizures per patient and chart recall  · EMR states the patient takes Depakote q 8 hours, however the patient states he has not had refill on medication in some time due to lack of follow-up  · States his last seizure was likely months ago, denies an aura prior to seizure activity  · No EEG on file, does not follow with a neurologist  · No seizure-like activity noted in the emergency department  · Seizure precautions  · Neuro checks Q shift  · Restart home Depakote 500 mg p o  Q 8 hours, 1 dose given on admission    Homelessness  Assessment & Plan  · States he is currently homeless and is not currently working  · Admits to having a sister in the area    Depression  Assessment & Plan  · Reportedly told ER staff he had suicidal ideations with a plan to overdose on presentation  · Denies suicidality on H&P interview  · Psych consult placed    Tobacco abuse  Assessment & Plan  · Nicotine patch, cessation counseling        VTE Prophylaxis: Enoxaparin (Lovenox)  / sequential compression device   Code Status:  Full code  POLST: POLST is not applicable to this patient  Discussion with family:  Patient will contact family independently    Anticipated Length of Stay:  Patient will be admitted on an Inpatient basis with an anticipated length of stay of greater than 2 midnights  Justification for Hospital Stay:  Opiate withdrawal, seizure history not currently taking his home medications    Total Time for Visit, including Counseling / Coordination of Care: 30 minutes  Greater than 50% of this total time spent on direct patient counseling and coordination of care  Chief Complaint:   I need detox    History of Present Illness:    Radha Askew is a 30 y o  male with a past medical history of opioid abuse, seizures, tobacco abuse, depression, homelessness who presents to the ER tonight requesting detox  He admits to using dope, his last use approximately 4 bags immediately prior to his arrival   He denies further concurrent alcohol or drug abuse, though he admits to drinking few beers earlier in the week  He was initially screened in the emergency department for admission to [facility] detox center, however given his history of seizures required further medical clearance  Chart review indicates the patient has been prescribed Depakote p o  Q 8 hours, however he states he has not been taking this regularly given his lack of follow-up  He states his last seizure was likely several months ago, he denies getting an aura prior to seizure activity, and states I do not get them a lot"  He had no witnessed seizure-like activity in the ER  On assessment, he is sleeping emergency department stretcher in no acute distress, he is able to give full medical history    He is admitted for medical clearance, re-initiation of his home Depakote dose and monitoring during opioid detox  Review of Systems:    Review of Systems   Constitutional: Positive for chills and fatigue  Negative for activity change, appetite change and fever  HENT: Negative for congestion, sinus pressure, sinus pain and sore throat  Eyes: Negative  Respiratory: Negative for choking, chest tightness, shortness of breath and wheezing  Cardiovascular: Negative for chest pain, palpitations and leg swelling  Gastrointestinal: Positive for nausea  Negative for abdominal distention, abdominal pain and vomiting  Endocrine: Negative  Genitourinary: Negative for difficulty urinating and flank pain  Musculoskeletal: Negative for myalgias  Skin: Negative  Allergic/Immunologic: Negative  Neurological: Negative for dizziness, seizures, syncope, weakness and light-headedness  Hematological: Negative  Past Medical and Surgical History:     Past Medical History:   Diagnosis Date    Anxiety     Bipolar disorder ([facility] )     Depression     Intermittent explosive disorder     Psychiatric disorder     Schizo-affective schizophrenia ([facility] )        Past Surgical History:   Procedure Laterality Date    LEG SURGERY         Meds/Allergies:    Prior to Admission medications    Medication Sig Start Date End Date Taking? Authorizing Provider   divalproex sodium (DEPAKOTE) 500 mg EC tablet Take by mouth every 8 (eight) hours    Historical Provider, MD   FLUoxetine HCl (PROZAC PO) Take by mouth    Historical Provider, MD   nicotine (NICODERM CQ) 21 mg/24 hr TD 24 hr patch Place 1 patch on the skin every 24 hours    Historical Provider, MD   QUEtiapine (SEROquel) 200 mg tablet Take 200 mg by mouth daily at bedtime    Historical Provider, MD     I have reviewed home medications with patient personally      Allergies: No Known Allergies    Social History:     Marital Status: Single   Occupation:  Currently not employed  Patient Pre-hospital Living Situation:  Currently homeless  Patient Pre-hospital Level of Mobility:  Independent  Patient Pre-hospital Diet Restrictions:  None  Substance Use History:   Social History     Substance and Sexual Activity   Alcohol Use Not Currently     Social History     Tobacco Use   Smoking Status Current Every Day Smoker    Packs/day: 1 00    Years: 12 00    Pack years: 12 00    Types: Cigarettes   Smokeless Tobacco Never Used   Tobacco Comment    " i don't know how many"     Social History     Substance and Sexual Activity   Drug Use Yes    Types: Methamphetamines, Prescription, Heroin, Marijuana    Comment: denies meth use currently       Family History:    History reviewed  No pertinent family history  Physical Exam:     Vitals:   Blood Pressure: 105/50 (10/17/19 0145)  Pulse: 86 (10/17/19 0119)  Temperature: 98 °F (36 7 °C) (10/16/19 1617)  Temp Source: Tympanic (10/16/19 1617)  Respirations: 16 (10/17/19 0119)  Weight - Scale: 64 8 kg (142 lb 12 8 oz) (10/16/19 1617)  SpO2: 98 % (10/17/19 0119)    Physical Exam   Constitutional: He is oriented to person, place, and time  Vital signs are normal  He appears well-developed  He is sleeping  He is easily aroused  Non-toxic appearance  He does not have a sickly appearance  He does not appear ill  No distress  Appears unkempt   HENT:   Head: Normocephalic and atraumatic  Mouth/Throat: Oropharynx is clear and moist and mucous membranes are normal  No oropharyngeal exudate  Eyes: Pupils are equal, round, and reactive to light  EOM and lids are normal  Right eye exhibits no discharge  Left eye exhibits no discharge  No scleral icterus  Neck: Normal range of motion  Neck supple  No tracheal deviation present  Cardiovascular: Normal rate, regular rhythm, normal heart sounds and intact distal pulses  Exam reveals no gallop and no friction rub  No murmur heard  Pulses:       Radial pulses are 2+ on the right side, and 2+ on the left side          Dorsalis pedis pulses are 2+ on the right side, and 2+ on the left side  Pulmonary/Chest: Effort normal and breath sounds normal  No accessory muscle usage  No respiratory distress  He has no decreased breath sounds  He has no wheezes  He exhibits no tenderness  Abdominal: Soft  Normal appearance and bowel sounds are normal  He exhibits no distension  There is no tenderness  Genitourinary:   Genitourinary Comments: Voiding independently   Musculoskeletal: Normal range of motion  He exhibits no edema, tenderness or deformity  Lymphadenopathy:     He has no cervical adenopathy  Neurological: He is oriented to person, place, and time and easily aroused  No cranial nerve deficit  GCS eye subscore is 4  GCS verbal subscore is 5  GCS motor subscore is 6  Skin: Skin is warm and dry  Capillary refill takes less than 2 seconds  He is not diaphoretic  No pallor  Psychiatric:   Flat affect   Nursing note and vitals reviewed  Additional Data:     Lab Results: I have personally reviewed pertinent reports  Results from last 7 days   Lab Units 10/17/19  0052   WBC Thousand/uL 8 80   HEMOGLOBIN g/dL 13 3*   HEMATOCRIT % 39 0*   PLATELETS Thousands/uL 252   NEUTROS PCT % 70*   LYMPHS PCT % 17*   MONOS PCT % 11*   EOS PCT % 1     Results from last 7 days   Lab Units 10/17/19  0052   SODIUM mmol/L 140   POTASSIUM mmol/L 3 8   CHLORIDE mmol/L 105   CO2 mmol/L 26   BUN mg/dL 9   CREATININE mg/dL 0 81   ANION GAP mmol/L 9   CALCIUM mg/dL 10 0   ALBUMIN g/dL 4 8   TOTAL BILIRUBIN mg/dL 1 10   ALK PHOS U/L 91   ALT U/L 38   AST U/L 38   GLUCOSE RANDOM mg/dL 104*                       Imaging: No imaging    No orders to display       EKG, Pathology, and Other Studies Reviewed on Admission:   · EKG:  Normal sinus rhythm on EKG    Allscripts / Epic Records Reviewed: Yes     ** Please Note: This note has been constructed using a voice recognition system   **

## 2019-10-28 NOTE — UTILIZATION REVIEW
Notification of Discharge  This is a Notification of Discharge from our facility [address]  Please be advised that this patient has been discharge from our facility  Below you will find the admission and discharge date and time including the patients disposition  PRESENTATION DATE: 10/16/2019  4:18 PM    IP ADMISSION DATE: 10/17/19 0033   DISCHARGE DATE: 10/17/2019  2:26 PM  DISPOSITION: Left against medical advice or discontinued care Left against medical advice or discontinued care   Admission Orders listed below:  Admission Orders (From admission, onward)     Ordered        10/17/19 0033  Inpatient Admission (expected length of stay for this patient Order details is greater than two midnights)  Once                   Please contact the UR Department if additional information is required to close this patient's authorization/case  [address]  Utilization Review Department  Phone: [phone]; Fax [phone]  [email or web address]  ATTENTION: Please call with any questions or concerns to [phone]  and carefully listen to the prompts so that you are directed to the right person  Send all requests for admission clinical reviews, approved or denied determinations and any other requests to fax [phone]   All voicemails are confidential

## 2019-11-09 ENCOUNTER — HOSPITAL ENCOUNTER (EMERGENCY)
Facility: HOSPITAL | Age: 29
Discharge: HOME | End: 2019-11-09
Attending: EMERGENCY MEDICINE | Admitting: EMERGENCY MEDICINE
Payer: COMMERCIAL

## 2019-11-09 VITALS
SYSTOLIC BLOOD PRESSURE: 101 MMHG | HEART RATE: 90 BPM | OXYGEN SATURATION: 97 % | RESPIRATION RATE: 16 BRPM | BODY MASS INDEX: 22.42 KG/M2 | DIASTOLIC BLOOD PRESSURE: 55 MMHG | TEMPERATURE: 98.6 F | WEIGHT: 138.89 LBS

## 2019-11-09 DIAGNOSIS — F11.20 HEROIN DEPENDENCE (HCC): Primary | ICD-10-CM

## 2019-11-09 LAB
AMPHETAMINES SERPL QL SCN: NEGATIVE
BARBITURATES UR QL: NEGATIVE
BENZODIAZ UR QL: NEGATIVE
COCAINE UR QL: NEGATIVE
ETHANOL EXG-MCNC: 0 MG/DL
METHADONE UR QL: NEGATIVE
OPIATES UR QL SCN: POSITIVE
PCP UR QL: NEGATIVE
THC UR QL: NEGATIVE

## 2019-11-09 PROCEDURE — 80307 DRUG TEST PRSMV CHEM ANLYZR: CPT | Performed by: EMERGENCY MEDICINE

## 2019-11-09 PROCEDURE — 82075 ASSAY OF BREATH ETHANOL: CPT | Performed by: EMERGENCY MEDICINE

## 2019-11-09 PROCEDURE — 99282 EMERGENCY DEPT VISIT SF MDM: CPT | Performed by: EMERGENCY MEDICINE

## 2019-11-09 PROCEDURE — 99283 EMERGENCY DEPT VISIT LOW MDM: CPT

## 2019-11-09 RX ORDER — DIPHENHYDRAMINE HCL 25 MG
25 TABLET ORAL ONCE
Status: COMPLETED | OUTPATIENT
Start: 2019-11-09 | End: 2019-11-09

## 2019-11-09 RX ADMIN — DIPHENHYDRAMINE HCL 25 MG: 25 TABLET ORAL at 16:38

## 2019-11-09 NOTE — ED PROVIDER NOTES
History  Chief Complaint   Patient presents with    Psychiatric Evaluation     pt is well known to the department, told to come by "the , they said i need rehab, i agree" last used heroin at 0400, pt lethargic  denies SI and HI  wants rehab     Patient is a 25-year-old male well-known to be in this department who presents requesting help with rehab placement tonight  Patient admits to using 1 bag of IV heroin earlier today as well as smoking K2 earlier this morning  Patient denies any suicidal ideation  Denies any mental health issues this time  States that at [facility] said he looked bad and recommended that he come to the hospital for evaluation  Patient has been seen several times the past for similar patient denies any other drug use outside of heroin and K2  Patient has no other symptoms at this time  Prior to Admission Medications   Prescriptions Last Dose Informant Patient Reported? Taking? FLUoxetine HCl (PROZAC PO)   Yes No   Sig: Take by mouth   QUEtiapine (SEROquel) 200 mg tablet   Yes No   Sig: Take 200 mg by mouth daily at bedtime   divalproex sodium (DEPAKOTE) 500 mg EC tablet   Yes No   Sig: Take by mouth every 8 (eight) hours   nicotine (NICODERM CQ) 21 mg/24 hr TD 24 hr patch   Yes No   Sig: Place 1 patch on the skin every 24 hours      Facility-Administered Medications: None       Past Medical History:   Diagnosis Date    Anxiety     B[facility] disorder ([facility]     Depression     Intermittent explosive disorder     Psychiatric disorder     Schizo-affective schizophrenia ([facility] )        Past Surgical History:   Procedure Laterality Date    LEG SURGERY         History reviewed  No pertinent family history  I have reviewed and agree with the history as documented      Social History     Tobacco Use    Smoking status: Current Every Day Smoker     Packs/day: 1 00     Years: 12 00     Pack years: 12 00     Types: Cigarettes    Smokeless tobacco: Never Used   [facility] Tobacco comment: " i don't know how many"   Substance Use Topics    Alcohol use: Not Currently    Drug use: Yes     Types: Methamphetamines, Prescription, Heroin, Marijuana     Comment: denies meth use currently        Review of Systems   Constitutional: Negative  HENT: Negative  Eyes: Negative  Respiratory: Negative  Cardiovascular: Negative  Gastrointestinal: Negative  Endocrine: Negative  Genitourinary: Negative  Musculoskeletal: Negative  Skin: Negative  Allergic/Immunologic: Negative  Neurological: Negative  Hematological: Negative  Psychiatric/Behavioral: Negative  All other systems reviewed and are negative  Physical Exam  Physical Exam   Constitutional: He is oriented to person, place, and time  He appears well-developed  Disheveled   HENT:   Head: Normocephalic  Nose: Nose normal    Mouth/Throat: Oropharynx is clear and moist    Eyes: Pupils are equal, round, and reactive to light  Conjunctivae are normal    Neck: Normal range of motion  Neck supple  Cardiovascular: Normal rate, regular rhythm, normal heart sounds and intact distal pulses  Pulmonary/Chest: Effort normal and breath sounds normal    Abdominal: Soft  Bowel sounds are normal    Musculoskeletal: Normal range of motion  Neurological: He is alert and oriented to person, place, and time  No cranial nerve deficit  Skin: Capillary refill takes less than 2 seconds  Multiple picking sores on right ear right lateral neck and posterior neck  Psychiatric: His speech is normal  Thought content normal  He is slowed  Cognition and memory are normal  He is attentive  Nursing note and vitals reviewed        Vital Signs  ED Triage Vitals [11/09/19 1534]   Temperature Pulse Respirations Blood Pressure SpO2   98 6 °F (37 °C) 90 16 101/55 97 %      Temp Source Heart Rate Source Patient Position - Orthostatic VS BP Location FiO2 (%)   Tympanic Monitor Sitting Left arm --      Pain Score       3 Vitals:    11/09/19 1534   BP: 101/55   Pulse: 90   Patient Position - Orthostatic VS: Sitting         Visual Acuity      ED Medications  Medications   diphenhydrAMINE (BENADRYL) tablet 25 mg (25 mg Oral Given 11/9/19 1638)       Diagnostic Studies  Results Reviewed     Procedure Component Value Units Date/Time    Rapid drug screen, urine [[id number]]  (Abnormal) Collected:  11/09/19 1619    Lab Status:  Final result Specimen:  Urine, Clean Catch Updated:  11/09/19 1642     Amph/Meth UR Negative     Barbiturate Ur Negative     Benzodiazepine Urine Negative     Cocaine Urine Negative     Methadone Urine Negative     Opiate Urine Positive     PCP Ur Negative     THC Urine Negative    Narrative:       Presumptive report  If requested, specimen will be sent to reference lab for confirmation  FOR MEDICAL PURPOSES ONLY  IF CONFIRMATION NEEDED PLEASE CONTACT THE LAB WITHIN 5 DAYS  Drug Screen Cutoff Levels:  AMPHETAMINE/METHAMPHETAMINES  1000 ng/mL  BARBITURATES     200 ng/mL  BENZODIAZEPINES     200 ng/mL  COCAINE      300 ng/mL  METHADONE      300 ng/mL  OPIATES      300 ng/mL  PHENCYCLIDINE     25 ng/mL  THC       50 ng/mL      POCT alcohol breath test [[id number]]  (Normal) Resulted:  11/09/19 1619    Lab Status:  Final result Updated:  11/09/19 1619     EXTBreath Alcohol 0 00                 No orders to display              Procedures  Procedures       ED Course  ED Course as of Nov 09 1716   Sat Nov 09, 2019   1711 Patient asking to leave at this point  [facility] 7 o'clock tonight    States he is willing to come back tomorrow to discuss with the host program                                   MDM  Number of Diagnoses or Management Options  Heroin dependence ([facility] ):      Amount and/or Complexity of Data Reviewed  Review and summarize past medical records: yes        Disposition  Final diagnoses:   Heroin dependence ([address] )     Time reflects when diagnosis was documented in both MDM as applicable and the Disposition within this note     Time User Action Codes Description Comment    11/9/2019  5:12 PM Marci Morocho Add [F11 20] Heroin dependence [facility])       ED Disposition     ED Disposition Condition Date/Time Comment    Discharge Stable Sat Nov 9, 2019  5:12 PM Peytonkevin Rivera discharge to home/self care  Follow-up Information     Follow up With Specialties Details Why Elvis Bowles MD Family Medicine   [address] [place] [address]  [phone]            Patient's Medications   Discharge Prescriptions    No medications on file     No discharge procedures on file      ED Provider  Electronically Signed by           Dorina Logan MD  11/09/19 0503

## 2019-11-10 ENCOUNTER — HOSPITAL ENCOUNTER (EMERGENCY)
Facility: HOSPITAL | Age: 29
Discharge: HOME | End: 2019-11-10
Attending: EMERGENCY MEDICINE
Payer: COMMERCIAL

## 2019-11-10 VITALS
DIASTOLIC BLOOD PRESSURE: 74 MMHG | OXYGEN SATURATION: 99 % | WEIGHT: 145.06 LBS | RESPIRATION RATE: 16 BRPM | SYSTOLIC BLOOD PRESSURE: 122 MMHG | HEART RATE: 65 BPM | BODY MASS INDEX: 23.41 KG/M2 | TEMPERATURE: 96.8 F

## 2019-11-10 DIAGNOSIS — F11.10 HEROIN ABUSE (HCC): Primary | ICD-10-CM

## 2019-11-10 PROCEDURE — 99284 EMERGENCY DEPT VISIT MOD MDM: CPT

## 2019-11-10 PROCEDURE — 99282 EMERGENCY DEPT VISIT SF MDM: CPT | Performed by: EMERGENCY MEDICINE

## 2019-11-10 NOTE — ED NOTES
Patient reports he is coming in for drug rehab, seen yesterday by host but they had no transportation  Last heroin IV use two days ago  Lunch tray provided        Michelle Goss RN  11/10/19 1854

## 2019-11-10 NOTE — ED NOTES
Pt has no needs at this time and is sitting on bed in no apparent distress        Donald Kuhn RN  11/10/19 6717

## 2019-11-11 NOTE — ED PROVIDER NOTES
History  Chief Complaint   Patient presents with    Drug / Alcohol Assessment     states that he was told to return today for transportation to rehab because they did not have transport yesterday  denies SI/HI/HI       Drug / Alcohol Assessment   Location:  Generalized withdrawal symptoms  Severity:  Moderate  Onset quality:  Gradual  Duration:  2 days  Timing:  Constant  Progression:  Worsening  Chronicity:  New  Associated symptoms: fatigue, myalgias and rhinorrhea    Associated symptoms: no abdominal pain, no chest pain, no cough, no diarrhea, no fever, no headaches, no nausea, no rash, no shortness of breath, no sore throat and no wheezing        Prior to Admission Medications   Prescriptions Last Dose Informant Patient Reported? Taking? FLUoxetine HCl (PROZAC PO)   Yes No   Sig: Take by mouth   QUEtiapine (SEROquel) 200 mg tablet   Yes No   Sig: Take 200 mg by mouth daily at bedtime   divalproex sodium (DEPAKOTE) 500 mg EC tablet   Yes No   Sig: Take by mouth every 8 (eight) hours   nicotine (NICODERM CQ) 21 mg/24 hr TD 24 hr patch   Yes No   Sig: Place 1 patch on the skin every 24 hours      Facility-Administered Medications: None       Past Medical History:   Diagnosis Date    Anxiety     [facility]ipolar disorder ([facility] )     Depression     Intermittent explosive disorder     Psychiatric disorder     Schizo-affective schizophrenia ([facility] )        Past Surgical History:   Procedure Laterality Date    LEG SURGERY         History reviewed  No pertinent family history  I have reviewed and agree with the history as documented      Social History     Tobacco Use    Smoking status: Current Every Day Smoker     Packs/day: 0 50     Years: 12 00     Pack years: 6 00     Types: Cigarettes    Smokeless tobacco: Never Used   Substance Use Topics    Alcohol use: Not Currently    Drug use: Yes     Types: Methamphetamines, Prescription, Heroin, Marijuana     Comment: denies meth use currently        Review of Systems Constitutional: Positive for fatigue  Negative for chills and fever  HENT: Positive for rhinorrhea  Negative for sore throat and trouble swallowing  Eyes: Negative for pain  Respiratory: Negative for cough, shortness of breath, wheezing and stridor  Cardiovascular: Negative for chest pain and leg swelling  Gastrointestinal: Negative for abdominal pain, diarrhea and nausea  Endocrine: Negative for polyuria  Genitourinary: Negative for dysuria, flank pain and urgency  Musculoskeletal: Positive for myalgias  Negative for joint swelling and neck stiffness  Skin: Negative for rash  Allergic/Immunologic: Negative for immunocompromised state  Neurological: Negative for dizziness, syncope, weakness, numbness and headaches  Psychiatric/Behavioral: Negative for confusion and suicidal ideas  All other systems reviewed and are negative  Physical Exam  Physical Exam   Constitutional: He is oriented to person, place, and time  He appears well-developed and well-nourished  HENT:   Head: Normocephalic and atraumatic  Eyes: Pupils are equal, round, and reactive to light  EOM are normal    Neck: Normal range of motion  Neck supple  Cardiovascular: Normal rate and regular rhythm  Exam reveals no friction rub  No murmur heard  Pulmonary/Chest: Breath sounds normal  No respiratory distress  He has no wheezes  He has no rales  Abdominal: Soft  Bowel sounds are normal  He exhibits no distension  There is no tenderness  Musculoskeletal: Normal range of motion  He exhibits no edema or tenderness  Neurological: He is alert and oriented to person, place, and time  Skin: Skin is warm  No rash noted  Psychiatric: He has a normal mood and affect  Nursing note and vitals reviewed        Vital Signs  ED Triage Vitals   Temperature Pulse Respirations Blood Pressure SpO2   11/10/19 1328 11/10/19 1330 11/10/19 1330 11/10/19 1330 11/10/19 1330   97 9 °F (36 6 °C) 69 18 123/69 100 %      Temp Source Heart Rate Source Patient Position - Orthostatic VS BP Location FiO2 (%)   11/10/19 1328 11/10/19 1506 11/10/19 1506 11/10/19 1506 --   Tympanic Monitor Sitting Left arm       Pain Score       --                  Vitals:    11/10/19 1330 11/10/19 1506   BP: 123/69 122/74   Pulse: 69 65   Patient Position - Orthostatic VS:  Sitting         Visual Acuity      ED Medications  Medications - No data to display    Diagnostic Studies  Results Reviewed     None                 No orders to display              Procedures  Procedures       ED Course                               MDM  Number of Diagnoses or Management Options  Heroin abuse [facility]): new and requires workup  Diagnosis management comments: This is a 52-year-old male with a history of heroin abuse who presents emergency department with withdrawal symptoms  The patient was seen here yesterday for similar symptoms and issues he was seen by the host program   At that point time he was discharged  There were no beds available  In the department  Patient was seen by the host program   He was sleep at this point time given there is no beds they have his contact information will continue to call me if there is any issues  Plan outpatient management followup    Pt re-examined and evaluated after testing and treatment  Spoke with the patient and feeling improved and sxs have resolved  Will discharge home with close f/u with pcp and instructed to return to the ED if sxs worsen or continue  Pt agrees with the plan for discharge and feels comfortable to go home with proper f/u  Advised to return for worsening or additional problems  Diagnostic tests were reviewed and questions answered  Diagnosis, care plan and treatment options were discussed  The patient understand instructions and will follow up as directed      Counseling: I had a detailed discussion with the patient and/or guardian regarding: the historical points, exam findings, and any diagnostic results supporting the discharge diagnosis, lab results, radiology results, discharge instructions reviewed with patient and/or family/caregiver and understanding was verbalized  Instructions given to return to the emergency department if symptoms worsen or persist, or if there are any questions or concerns that arise at home  All labs reviewed and utilized in the medical decision making process    All radiology studies independently viewed by me and interpreted by the radiologist       Disposition  Final diagnoses:   Heroin abuse ([address] )     Time reflects when diagnosis was documented in both MDM as applicable and the Disposition within this note     Time User Action Codes Description Comment    11/10/2019  2:57 PM Burgess Cabral Add [F11 10] Heroin abuse [facility])       ED Disposition     ED Disposition Condition Date/Time Comment    Discharge Stable Sun Nov 10, 2019  2:57 PM Joey Rivera discharge to home/self care  Follow-up Information    None         Discharge Medication List as of 11/10/2019  2:57 PM      CONTINUE these medications which have NOT CHANGED    Details   divalproex sodium (DEPAKOTE) 500 mg EC tablet Take by mouth every 8 (eight) hours, Historical Med      FLUoxetine HCl (PROZAC PO) Take by mouth, Historical Med      nicotine (NICODERM CQ) 21 mg/24 hr TD 24 hr patch Place 1 patch on the skin every 24 hours, Historical Med      QUEtiapine (SEROquel) 200 mg tablet Take 200 mg by mouth daily at bedtime, Historical Med           No discharge procedures on file      ED Provider  Electronically Signed by           Mahsa Whyte DO  11/11/19 5907

## 2019-11-27 NOTE — ED NOTES
Acute combined systolic and diastolic heart failure.  Started on IV diuresis.  Appreciate Cards input.  Unsure etiology.  Secondary to uncontrolled HTN?  Would need improvement of Creat for ischemic work up.  Malignant HTN and sent to ICU on Cardene gtt.  Increased dose of Hydralazine.  Continued Isosorbide and started on Coreg.  Able to wean off Cardene gtt.  Will need life vest upon discharge.       Admitting provider at bedside       Shannan Polanco  10/17/19 0038

## 2019-12-04 ENCOUNTER — HOSPITAL ENCOUNTER (EMERGENCY)
Facility: HOSPITAL | Age: 29
Discharge: HOME | End: 2019-12-04
Attending: EMERGENCY MEDICINE
Payer: COMMERCIAL

## 2019-12-04 VITALS
WEIGHT: 151.68 LBS | SYSTOLIC BLOOD PRESSURE: 103 MMHG | RESPIRATION RATE: 14 BRPM | TEMPERATURE: 99.2 F | DIASTOLIC BLOOD PRESSURE: 67 MMHG | HEART RATE: 75 BPM | BODY MASS INDEX: 24.48 KG/M2 | OXYGEN SATURATION: 97 %

## 2019-12-04 DIAGNOSIS — Z71.51 ENCOUNTER FOR DRUG REHABILITATION: Primary | ICD-10-CM

## 2019-12-04 PROCEDURE — 99284 EMERGENCY DEPT VISIT MOD MDM: CPT

## 2019-12-04 PROCEDURE — 99282 EMERGENCY DEPT VISIT SF MDM: CPT | Performed by: PHYSICIAN ASSISTANT

## 2019-12-04 NOTE — ED PROVIDER NOTES
History  Chief Complaint   Patient presents with    Detox Evaluation     pt requesting rehab resources; last used heroin 0214 per pt 1 bag  Per EMS pt was found "acting weird" in the street  Patient is a 81-year-old male who is well-known to this facility and is a recreational drug user on a frequent basis and presents today with EMS for a reported recreational drug use  Patient reports at 2 or 3:00 a m  He used 1 bag of heroin  Patient alert and oriented upon initial evaluation is requesting rehabilitation services through host   Patient denies any suicidal or homicidal ideations and is complaining that he is hungry  History provided by:  Patient  Detox Evaluation   Similar prior episodes: yes    Severity:  Moderate  Onset quality:  Gradual  Timing:  Constant  Progression:  Unchanged  Chronicity:  Recurrent  Suspected agents: heroin  Associated symptoms: no abdominal pain, no nausea, no palpitations, no shortness of breath and no vomiting    Risk factors: addiction treatment        Prior to Admission Medications   Prescriptions Last Dose Informant Patient Reported? Taking? FLUoxetine HCl (PROZAC PO)   Yes No   Sig: Take by mouth   QUEtiapine (SEROquel) 200 mg tablet   Yes No   Sig: Take 200 mg by mouth daily at bedtime   divalproex sodium (DEPAKOTE) 500 mg EC tablet   Yes No   Sig: Take by mouth every 8 (eight) hours   nicotine (NICODERM CQ) 21 mg/24 hr TD 24 hr patch   Yes No   Sig: Place 1 patch on the skin every 24 hours      Facility-Administered Medications: None       Past Medical History:   Diagnosis Date    Anxiety     [facility] ([facility] )     Depression     Intermittent explosive disorder     Psychiatric disorder     Schizo-affective schizophrenia ([facility] )        Past Surgical History:   Procedure Laterality Date    LEG SURGERY         History reviewed  No pertinent family history  I have reviewed and agree with the history as documented      Social History     Tobacco Use    Smoking status: Current Every Day Smoker     Packs/day: 0 50     Years: 12 00     Pack years: 6 00     Types: Cigarettes    Smokeless tobacco: Never Used   Substance Use Topics    Alcohol use: Not Currently    Drug use: Yes     Types: Methamphetamines, Prescription, Heroin, Marijuana     Comment: denies meth use currently        Review of Systems   Constitutional: Negative for chills, fatigue and fever  HENT: Negative for congestion, ear pain, rhinorrhea and sore throat  Eyes: Negative for redness  Respiratory: Negative for chest tightness and shortness of breath  Cardiovascular: Negative for chest pain and palpitations  Gastrointestinal: Negative for abdominal pain, nausea and vomiting  Genitourinary: Negative for dysuria and hematuria  Musculoskeletal: Negative  Skin: Negative for rash  Neurological: Negative for dizziness, syncope, light-headedness and numbness  Physical Exam  Physical Exam   Constitutional: He is oriented to person, place, and time  He appears well-developed and well-nourished  Tired appearing male alert and oriented   HENT:   Head: Normocephalic and atraumatic  Eyes: Pupils are equal, round, and reactive to light  Neck: Normal range of motion  Cardiovascular: Normal rate, regular rhythm and normal heart sounds  Pulmonary/Chest: Effort normal and breath sounds normal    Abdominal: Soft  There is no tenderness  There is no guarding  Lymphadenopathy:     He has no cervical adenopathy  Neurological: He is alert and oriented to person, place, and time  Skin: Skin is warm and dry  Capillary refill takes less than 2 seconds  Psychiatric: He has a normal mood and affect  Nursing note and vitals reviewed        Vital Signs  ED Triage Vitals [12/04/19 1444]   Temperature Pulse Respirations Blood Pressure SpO2   99 2 °F (37 3 °C) 75 14 103/67 97 %      Temp Source Heart Rate Source Patient Position - Orthostatic VS BP Location FiO2 (%)   Tympanic Monitor Sitting Left arm --      Pain Score       --           Vitals:    12/04/19 1444   BP: 103/67   Pulse: 75   Patient Position - Orthostatic VS: Sitting         Visual Acuity      ED Medications  Medications - No data to display    Diagnostic Studies  Results Reviewed     None                 No orders to display              Procedures  Procedures         ED Course  ED Course as of Dec 04 1617   Wed Dec 04, 2019   1608 Patient requesting to leave as he "cannot wait around any longer for rehabilitation"  MDM      Disposition  Final diagnoses:   Encounter for drug rehabilitation     Time reflects when diagnosis was documented in both MDM as applicable and the Disposition within this note     Time User Action Codes Description Comment    12/4/2019  4:08 PM Taya Jim Add [Z71 51] Encounter for drug rehabilitation       ED Disposition     ED Disposition Condition Date/Time Comment    Discharge Good Wed Dec 4, 2019  4:08 PM Aide Rivera discharge to home/self care  Follow-up Information     Follow up With Specialties Details Why Contact Info    Mounika France MD Family Medicine Schedule an appointment as soon as possible for a visit   [phone] [phone]            Discharge Medication List as of 12/4/2019  4:08 PM      CONTINUE these medications which have NOT CHANGED    Details   divalproex sodium (DEPAKOTE) 500 mg EC tablet Take by mouth every 8 (eight) hours, Historical Med      FLUoxetine HCl (PROZAC PO) Take by mouth, Historical Med      nicotine (NICODERM CQ) 21 mg/24 hr TD 24 hr patch Place 1 patch on the skin every 24 hours, Historical Med      QUEtiapine (SEROquel) 200 mg tablet Take 200 mg by mouth daily at bedtime, Historical Med           No discharge procedures on file      ED Provider  Electronically Signed by           Maikel Lafleur PA-C  12/04/19 5003

## 2019-12-06 NOTE — ED ATTENDING ATTESTATION
I was the attending physician on duty at the time the patient visited the emergency department  The patient was evaluated and dispositioned by the APC  I was personally available for consultation  I am administratively signing the chart after the fact      Anne Monsivais MD

## 2019-12-18 ENCOUNTER — TELEPHONE (OUTPATIENT)
Dept: FAMILY MEDICINE CLINIC | Facility: CLINIC | Age: 29
End: 2019-12-18

## 2019-12-18 ENCOUNTER — HOSPITAL ENCOUNTER (EMERGENCY)
Facility: HOSPITAL | Age: 29
Discharge: HOME | End: 2019-12-18
Attending: EMERGENCY MEDICINE
Payer: COMMERCIAL

## 2019-12-18 VITALS
TEMPERATURE: 100 F | SYSTOLIC BLOOD PRESSURE: 109 MMHG | OXYGEN SATURATION: 100 % | RESPIRATION RATE: 18 BRPM | DIASTOLIC BLOOD PRESSURE: 64 MMHG | BODY MASS INDEX: 24.09 KG/M2 | WEIGHT: 149.91 LBS | HEART RATE: 87 BPM | HEIGHT: 66 IN

## 2019-12-18 DIAGNOSIS — T68.XXXA HYPOTHERMIA DUE TO COLD ENVIRONMENT: ICD-10-CM

## 2019-12-18 DIAGNOSIS — T40.1X1A HEROIN OVERDOSE (HCC): Primary | ICD-10-CM

## 2019-12-18 DIAGNOSIS — Z59.00 HOMELESSNESS: ICD-10-CM

## 2019-12-18 PROCEDURE — 99284 EMERGENCY DEPT VISIT MOD MDM: CPT

## 2019-12-18 PROCEDURE — 99284 EMERGENCY DEPT VISIT MOD MDM: CPT | Performed by: EMERGENCY MEDICINE

## 2019-12-18 RX ORDER — ACETAMINOPHEN 325 MG/1
650 TABLET ORAL ONCE
Status: COMPLETED | OUTPATIENT
Start: 2019-12-18 | End: 2019-12-18

## 2019-12-18 RX ADMIN — ACETAMINOPHEN 650 MG: 325 TABLET ORAL at 14:29

## 2019-12-18 SDOH — ECONOMIC STABILITY - HOUSING INSECURITY: HOMELESSNESS UNSPECIFIED: Z59.00

## 2019-12-18 NOTE — ED NOTES
Patient is followed by the [address] [place] [address] for addiction treatment  Asked if he wanted to call his counselor, he declined, states that he would call or go over tomorrow      Patient is resting quietly

## 2019-12-18 NOTE — ED PROVIDER NOTES
History  Chief Complaint   Patient presents with    Overdose - Accidental     Patient found unresponsive in [facility], admits to using 1 bag of IV heroin, awake with Narcan  HPI    This is a 62-year-old male presents to the emergency department multiple medical problems specifically anxiety, bipolar disorder, depression, intermittent explosive disorder, schizoaffective schizophrenia and homelessness  EMS was summoned after a he is a bystander saw the patient unresponsive in a local park  Law enforcement administered 8 mg or intranasal Narcan  Patient had decreased respiratory drive at a respiratory rate of 4  The patient arrived emergency department he was conscious nowhere and oriented x3 GCS of 15  Patient denies suicidal or homicidal tendencies  Prior to Admission Medications   Prescriptions Last Dose Informant Patient Reported? Taking? FLUoxetine HCl (PROZAC PO)   Yes No   Sig: Take by mouth   QUEtiapine (SEROquel) 200 mg tablet   Yes No   Sig: Take 200 mg by mouth daily at bedtime   divalproex sodium (DEPAKOTE) 500 mg EC tablet   Yes No   Sig: Take by mouth every 8 (eight) hours   nicotine (NICODERM CQ) 21 mg/24 hr TD 24 hr patch   Yes No   Sig: Place 1 patch on the skin every 24 hours      Facility-Administered Medications: None       Past Medical History:   Diagnosis Date    Anxiety     [facility] [address] [facility]     Depression     Intermittent explosive disorder     Psychiatric disorder     Schizo-affective schizophrenia ([facility] )     [facility]        Past Surgical History:   Procedure Laterality Date    LEG SURGERY         History reviewed  No pertinent family history  I have reviewed and agree with the history as documented      Social History     Tobacco Use    Smoking status: Current Every Day Smoker     Packs/day: 0 50     Years: 12 00     Pack years: 6 00     Types: Cigarettes    Smokeless tobacco: Never Used   Substance Use Topics    Alcohol use: Not Currently    Drug use: Yes     Types: Methamphetamines, Prescription, Heroin, Marijuana     Comment: denies meth use currently        Review of Systems   Constitutional: Negative  HENT: Negative  Eyes: Negative  Respiratory: Negative  Cardiovascular: Negative  Gastrointestinal: Negative  Endocrine: Negative  Genitourinary: Negative  Musculoskeletal: Negative  Allergic/Immunologic: Negative  Neurological: Negative  Hematological: Negative  Psychiatric/Behavioral: Negative  Physical Exam  Physical Exam   Constitutional: He appears well-developed and well-nourished  HENT:   Head: Normocephalic  Right Ear: External ear normal    Left Ear: External ear normal    Nose: Nose normal    Mouth/Throat: Oropharynx is clear and moist    Abrasions noted on forehead  Eyes: Pupils are equal, round, and reactive to light  Conjunctivae and EOM are normal    Neck: Normal range of motion  Neck supple  Cardiovascular: Tachycardia present  Pulmonary/Chest: Effort normal    Abdominal: Soft  Bowel sounds are normal    Musculoskeletal: Normal range of motion  Neurological: He is alert  He displays no seizure activity  GCS eye subscore is 4  GCS verbal subscore is 5  GCS motor subscore is 6  Skin: Skin is warm  Capillary refill takes less than 2 seconds  Psychiatric: He has a normal mood and affect  His behavior is normal  Judgment and thought content normal    Nursing note and vitals reviewed        Vital Signs  ED Triage Vitals [12/18/19 1305]   Temperature Pulse Respirations Blood Pressure SpO2   (!) 95 °F (35 °C) (!) 135 (!) 24 (!) 179/97 98 %      Temp Source Heart Rate Source Patient Position - Orthostatic VS BP Location FiO2 (%)   Tympanic Monitor Sitting Right arm --      Pain Score       No Pain           Vitals:    12/18/19 1305 12/18/19 1428 12/18/19 1623 12/18/19 1628   BP: (!) 179/97 105/62 107/67 109/64   Pulse: (!) 135 (!) 106 89 87   Patient Position - Orthostatic VS: Sitting Lying Lying Sitting         Visual Acuity      ED Medications  Medications   acetaminophen (TYLENOL) tablet 650 mg (650 mg Oral Given 12/18/19 8619)       Diagnostic Studies  Results Reviewed     None                 No orders to display              Procedures  CriticalCare Time  Performed by: Priscilla Schuler DO  Authorized by: Barby Cr III, DO     Critical care provider statement:     Critical care time (minutes):  30    Critical care start time:  12/18/2019 2:39 PM    Critical care end time:  12/18/2019 3:39 PM  Comments:      Patient was found to be profoundly hypothermic at 94  Bearhugger applied  Patient's temperature came up to normal limits  ED Course  ED Course as of Dec 18 1659   Wed Dec 18, 2019   1524 Patient is reassessed at this time  Noted patient's initial temperature was 94 7° the patient was outside in the extended period time in the low ambient temperatures  Patient now has a normal temperature of 37 8° degrees C under the [employer]  Patient is conscious nowhere oriented x3  Patient is stable  1525 Patient is declining HOST evaluation because he is currently in the [address]  Patient is reassessed this time  Patient is conscious and aware and oriented x3 GCS of 15 staples consumed 20 oz of water and will be discharged  MDM  Number of Diagnoses or Management Options  Heroin overdose ([facility] ):   Homelessness:   Hypothermia due to cold environment:   Diagnosis management comments: This is a very pleasant unfortunate 76-year-old gentleman who is currently homeless presents to the emergency department and given Narcan by local police  Patient was observed for 3 0 5 hours and was conscious aware and oriented did not need any other further intervention terms of Narcan  Since patient was outside for an extended    Time he was hypothermic and passive rewarming measures took place and the patient's temperature returned back to normal without difficulty  Portions of the record may have been created with voice recognition software  Occasional wrong word or "sound a like" substitutions may have occurred due to the inherent limitations of voice recognition software  Read the chart carefully and recognize, using context, where substitutions have occurred  Counseling: I had a detailed discussion with the patient and/or guardian regarding: the historical points, exam findings, and any diagnostic results supporting the discharge diagnosis, lab results, radiology results, discharge instructions reviewed with patient and/or family/caregiver and understanding was verbalized  Instructions given to return to the emergency department if symptoms worsen or persist, or if there are any questions or concerns that arise at home          Disposition  Final diagnoses:   Heroin overdose ([facility] [address] )   Hypothermia due to cold environment   Homelessness     Time reflects when diagnosis was documented in both MDM as applicable and the Disposition within this note     Time User Action Codes Description Comment    12/18/2019  4:16 PM Henry Rather Heroin overdose ([facility] [address] )     12/18/2019  4:17 PM Roddy Jimenez, [address]  XXXA] Hypothermia due to cold environment     12/18/2019  4:17 PM Ashley Lim Add [Z59 0] Homelessness       ED Disposition     ED Disposition Condition Date/Time Comment    Discharge Stable Wed Dec 18, 2019  4:16 PM Deb Rivera discharge to home/self care              Follow-up Information     Follow up With Specialties Details Why Contact Info    Humera Capps MD [facility]   [address] [place] [address] Drive  [phone]            Discharge Medication List as of 12/18/2019  4:18 PM      CONTINUE these medications which have NOT CHANGED    Details   divalproex sodium (DEPAKOTE) 500 mg EC tablet Take by mouth every 8 (eight) hours, Historical Med      FLUoxetine HCl (PROZAC PO) Take by mouth, Historical Med      nicotine (NICODERM CQ) 21 mg/24 hr TD 24 hr patch Place 1 patch on the skin every 24 hours, Historical Med      QUEtiapine (SEROquel) 200 mg tablet Take 200 mg by mouth daily at bedtime, Historical Med           No discharge procedures on file      ED Provider  Electronically Signed by           Lulu Cabrera III, DO  12/18/19 4881

## 2019-12-18 NOTE — TELEPHONE ENCOUNTER
I have never seen pt and if he will not be seeing me I would like him marika==moved from my panel since there are multiple ER and hospital visits and he has never made a follow uo ov

## 2019-12-19 NOTE — TELEPHONE ENCOUNTER
Lucia or Irvin Nichole can help give direction, but we need to reach out to the patient and if he refuses the appointment then we can remove Dr Rosa Martínez from the PCP field?

## 2020-01-01 ENCOUNTER — HOSPITAL ENCOUNTER (EMERGENCY)
Facility: HOSPITAL | Age: 30
Discharge: HOME | End: 2020-01-01
Attending: EMERGENCY MEDICINE
Payer: COMMERCIAL

## 2020-01-01 VITALS
TEMPERATURE: 98 F | WEIGHT: 149.91 LBS | HEART RATE: 88 BPM | RESPIRATION RATE: 18 BRPM | DIASTOLIC BLOOD PRESSURE: 64 MMHG | SYSTOLIC BLOOD PRESSURE: 156 MMHG | OXYGEN SATURATION: 98 % | BODY MASS INDEX: 24.2 KG/M2

## 2020-01-01 DIAGNOSIS — F19.10 SUBSTANCE ABUSE (HCC): Primary | ICD-10-CM

## 2020-01-01 PROCEDURE — 99283 EMERGENCY DEPT VISIT LOW MDM: CPT

## 2020-01-01 PROCEDURE — 99282 EMERGENCY DEPT VISIT SF MDM: CPT | Performed by: EMERGENCY MEDICINE

## 2020-01-01 NOTE — ED NOTES
Dressed wounds on patients neck with vaseline gauze, gauze and tape  instructed patient on how to care for wounds and provided patient with gauze and tape        Valery Duncan RN  01/01/20 0083

## 2020-01-01 NOTE — ED PROVIDER NOTES
History  Chief Complaint   Patient presents with    Recreational Drug Use     heroin and K2 use today came to get checked and "rest"     Patient is a 60-year-old male well known to me who presents via [facility] requesting evaluation to information for outpatient rehab  Patient is to shooting 1 bag of heroin earlier today and then smoking K2  Did not pass out no Narcan was administered  Patient denies any suicidal ideations homicidal ideations or hallucinations  States that he recently got a apartment and does not want to lose that and does not want to go inpatient for any rehab  Is strictly looking for outpatient formation this time  No other complaints  Prior to Admission Medications   Prescriptions Last Dose Informant Patient Reported? Taking? FLUoxetine HCl (PROZAC PO)   Yes No   Sig: Take by mouth   QUEtiapine (SEROquel) 200 mg tablet   Yes No   Sig: Take 200 mg by mouth daily at bedtime   divalproex sodium (DEPAKOTE) 500 mg EC tablet   Yes No   Sig: Take by mouth every 8 (eight) hours   nicotine (NICODERM CQ) 21 mg/24 hr TD 24 hr patch   Yes No   Sig: Place 1 patch on the skin every 24 hours      Facility-Administered Medications: None       Past Medical History:   Diagnosis Date    Anxiety     [facility] [address] [facility]     Depression     Intermittent explosive disorder     Psychiatric disorder     [facility]     Seizures ([facility] )        Past Surgical History:   Procedure Laterality Date    LEG SURGERY         History reviewed  No pertinent family history  I have reviewed and agree with the history as documented      Social History     Tobacco Use    Smoking status: Current Every Day Smoker     Packs/day: 0 50     Years: 12 00     Pack years: 6 00     Types: Cigarettes    Smokeless tobacco: Never Used   Substance Use Topics    Alcohol use: Not Currently    Drug use: Yes     Types: Methamphetamines, Prescription, Heroin, Marijuana     Comment: denies meth use currently        Review of Systems   Constitutional: Negative  HENT: Negative  Eyes: Negative  Respiratory: Negative  Cardiovascular: Negative  Gastrointestinal: Negative  Endocrine: Negative  Genitourinary: Negative  Musculoskeletal: Negative  Skin: Positive for wound  Patient has chronic picking sores on face and neck  Allergic/Immunologic: Negative  Neurological: Negative  Hematological: Negative  Psychiatric/Behavioral: Negative  All other systems reviewed and are negative  Physical Exam  Physical Exam   Constitutional: He is oriented to person, place, and time  No distress  HENT:   Head: Normocephalic  Right Ear: External ear normal    Left Ear: External ear normal    Nose: Nose normal    Mouth/Throat: Oropharynx is clear and moist    Eyes: Pupils are equal, round, and reactive to light  Conjunctivae are normal    Neck: Normal range of motion  Neck supple  Cardiovascular: Normal rate, regular rhythm, normal heart sounds and intact distal pulses  Pulmonary/Chest: Effort normal and breath sounds normal    Abdominal: Soft  Bowel sounds are normal    Musculoskeletal: Normal range of motion  Neurological: He is alert and oriented to person, place, and time  Skin: Skin is warm  Capillary refill takes less than 2 seconds  [place]ores on face and neck improved from previous visit  Psychiatric: He has a normal mood and affect  His speech is normal and behavior is normal  Thought content normal  He is not actively hallucinating  Cognition and memory are normal  He expresses impulsivity  He expresses no homicidal ideation  He is attentive  Nursing note and vitals reviewed        Vital Signs  ED Triage Vitals [01/01/20 1358]   Temperature Pulse Respirations Blood Pressure SpO2   98 °F (36 7 °C) (!) 130 18 (!) 175/83 97 %      Temp Source Heart Rate Source Patient Position - Orthostatic VS BP Location FiO2 (%)   Tympanic Monitor Sitting Left arm --      Pain Score       No Pain           Vitals:    01/01/20 1358 01/01/20 1520   BP: (!) 175/83 156/64   Pulse: (!) 130 88   Patient Position - Orthostatic VS: Sitting Sitting         Visual Acuity      ED Medications  Medications - No data to display    Diagnostic Studies  Results Reviewed     None                 No orders to display              Procedures  Procedures         ED Course  ED Course as of Jan 03 1002 Wed Jan 01, 2020   1420 Patient is a lunch tray  Given outpatient resource will discharge this time  MDM  Number of Diagnoses or Management Options  Substance abuse [facility]):      Amount and/or Complexity of Data Reviewed  Obtain history from someone other than the patient: yes  Review and summarize past medical records: yes          Disposition  Final diagnoses:   Substance abuse ([address] )     Time reflects when diagnosis was documented in both MDM as applicable and the Disposition within this note     Time User Action Codes Description Comment    1/1/2020  2:21 PM Ramone Crawford Add [F19 10] Substance abuse [facility])       ED Disposition     ED Disposition Condition Date/Time Comment    Discharge Stable Wed Jan 1, 2020  2:21 PM Aide Rivera discharge to home/self care  Follow-up Information     Follow up With Specialties Details Why Contact Info    Jonas Mckay MD [facility]   [address] [place] [address] Drive  [phone]            Discharge Medication List as of 1/1/2020  2:21 PM      CONTINUE these medications which have NOT CHANGED    Details   divalproex sodium (DEPAKOTE) 500 mg EC tablet Take by mouth every 8 (eight) hours, Historical Med      FLUoxetine HCl (PROZAC PO) Take by mouth, Historical Med      nicotine (NICODERM CQ) 21 mg/24 hr TD 24 hr patch Place 1 patch on the skin every 24 hours, Historical Med      QUEtiapine (SEROquel) 200 mg tablet Take 200 mg by mouth daily at bedtime, Historical Med           No discharge procedures on file      ED Provider  Electronically Signed by           Jae López MD  01/03/20 1002

## 2020-01-21 ENCOUNTER — APPOINTMENT (EMERGENCY)
Dept: RADIOLOGY | Facility: HOSPITAL | Age: 30
End: 2020-01-21
Payer: COMMERCIAL

## 2020-01-21 ENCOUNTER — HOSPITAL ENCOUNTER (EMERGENCY)
Facility: HOSPITAL | Age: 30
Discharge: LEFT BEFORE DISCHARGE | End: 2020-01-21
Attending: EMERGENCY MEDICINE | Admitting: EMERGENCY MEDICINE
Payer: COMMERCIAL

## 2020-01-21 VITALS
OXYGEN SATURATION: 98 % | SYSTOLIC BLOOD PRESSURE: 116 MMHG | BODY MASS INDEX: 21.32 KG/M2 | WEIGHT: 132.1 LBS | TEMPERATURE: 98.8 F | DIASTOLIC BLOOD PRESSURE: 78 MMHG | HEART RATE: 87 BPM | RESPIRATION RATE: 16 BRPM

## 2020-01-21 DIAGNOSIS — L02.91 ABSCESS: ICD-10-CM

## 2020-01-21 DIAGNOSIS — Z51.89 VISIT FOR WOUND CHECK: ICD-10-CM

## 2020-01-21 DIAGNOSIS — R50.9 FEVER: ICD-10-CM

## 2020-01-21 DIAGNOSIS — L03.221 CELLULITIS OF NECK: ICD-10-CM

## 2020-01-21 DIAGNOSIS — F19.10 DRUG ABUSE (HCC): ICD-10-CM

## 2020-01-21 DIAGNOSIS — L03.114 CELLULITIS OF LEFT UPPER EXTREMITY: Primary | ICD-10-CM

## 2020-01-21 PROCEDURE — 99284 EMERGENCY DEPT VISIT MOD MDM: CPT | Performed by: PHYSICIAN ASSISTANT

## 2020-01-21 PROCEDURE — 73130 X-RAY EXAM OF HAND: CPT

## 2020-01-21 PROCEDURE — 99284 EMERGENCY DEPT VISIT MOD MDM: CPT

## 2020-01-21 PROCEDURE — 71046 X-RAY EXAM CHEST 2 VIEWS: CPT

## 2020-01-21 PROCEDURE — 93005 ELECTROCARDIOGRAM TRACING: CPT

## 2020-01-21 RX ORDER — ACETAMINOPHEN 325 MG/1
650 TABLET ORAL ONCE
Status: COMPLETED | OUTPATIENT
Start: 2020-01-21 | End: 2020-01-21

## 2020-01-21 RX ORDER — CLINDAMYCIN HYDROCHLORIDE 150 MG/1
450 CAPSULE ORAL EVERY 6 HOURS
Qty: 84 CAPSULE | Refills: 0 | Status: SHIPPED | OUTPATIENT
Start: 2020-01-21 | End: 2020-01-28

## 2020-01-21 RX ORDER — CEFAZOLIN SODIUM 2 G/50ML
2000 SOLUTION INTRAVENOUS ONCE
Status: DISCONTINUED | OUTPATIENT
Start: 2020-01-21 | End: 2020-01-21 | Stop reason: HOSPADM

## 2020-01-21 RX ORDER — IBUPROFEN 600 MG/1
600 TABLET ORAL ONCE
Status: COMPLETED | OUTPATIENT
Start: 2020-01-21 | End: 2020-01-21

## 2020-01-21 RX ORDER — CLINDAMYCIN HYDROCHLORIDE 150 MG/1
450 CAPSULE ORAL ONCE
Status: COMPLETED | OUTPATIENT
Start: 2020-01-21 | End: 2020-01-21

## 2020-01-21 RX ORDER — KETOROLAC TROMETHAMINE 30 MG/ML
15 INJECTION, SOLUTION INTRAMUSCULAR; INTRAVENOUS ONCE
Status: DISCONTINUED | OUTPATIENT
Start: 2020-01-21 | End: 2020-01-21 | Stop reason: HOSPADM

## 2020-01-21 RX ORDER — ACETAMINOPHEN 325 MG/1
650 TABLET ORAL EVERY 6 HOURS PRN
Qty: 30 TABLET | Refills: 0 | Status: SHIPPED | OUTPATIENT
Start: 2020-01-21

## 2020-01-21 RX ORDER — IBUPROFEN 400 MG/1
400 TABLET ORAL EVERY 8 HOURS PRN
Qty: 30 TABLET | Refills: 0 | Status: SHIPPED | OUTPATIENT
Start: 2020-01-21

## 2020-01-21 RX ADMIN — CLINDAMYCIN HYDROCHLORIDE 450 MG: 150 CAPSULE ORAL at 15:55

## 2020-01-21 RX ADMIN — ACETAMINOPHEN 650 MG: 325 TABLET ORAL at 15:55

## 2020-01-21 RX ADMIN — IBUPROFEN 600 MG: 600 TABLET ORAL at 15:55

## 2020-01-21 NOTE — ED PROVIDER NOTES
History  Chief Complaint   Patient presents with    Wound Check     wound to left wrist, pt says he popped a pimple a few days ago and now looks infected     41-year-old male with past medical history of psychiatric disorder and drug abuse presenting for evaluation of wound check  Patient reports that he takes at the left hand and now thinks that the area is infected  He states that he was unable to come to the hospital sooner for evaluation  He has been trying to keep the area covered and change the dressings approximately 2-3 times over the past week  He is also reporting picking wound to the right lateral neck that he states is improving however there are no previous descriptions of the wound or pictures of the wound in the chart review to compare to  Denies any fevers, chills or sweats  Does admit to IVDU but is unsure if he injected into the hand where the wound is  No numbness, tingling weakness  No headache, dizziness, chills or sweats  Prior to Admission Medications   Prescriptions Last Dose Informant Patient Reported? Taking? FLUoxetine HCl (PROZAC PO)   Yes No   Sig: Take by mouth   QUEtiapine (SEROquel) 200 mg tablet   Yes No   Sig: Take 200 mg by mouth daily at bedtime   divalproex sodium (DEPAKOTE) 500 mg EC tablet   Yes No   Sig: Take by mouth every 8 (eight) hours   nicotine (NICODERM CQ) 21 mg/24 hr TD 24 hr patch   Yes No   Sig: Place 1 patch on the skin every 24 hours      Facility-Administered Medications: None       Past Medical History:   Diagnosis Date    Anxiety     Bipolar disorder ([facility] )     Depression     Intermittent explosive disorder     Psychiatric disorder     Schizo-affective schizophrenia ([facility] )     Seizures ([facility] )        Past Surgical History:   Procedure Laterality Date    LEG SURGERY         History reviewed  No pertinent family history  I have reviewed and agree with the history as documented      Social History     Tobacco Use    Smoking status: Current Every Day Smoker     Packs/day: 0 50     Years: 12 00     Pack years: 6 00     Types: Cigarettes    Smokeless tobacco: Never Used   Substance Use Topics    Alcohol use: Not Currently    Drug use: Yes     Types: Prescription, Heroin, Marijuana        Review of Systems   All other systems reviewed and are negative  Physical Exam  Physical Exam   Constitutional: He is oriented to person, place, and time  He appears well-developed and well-nourished  No distress  HENT:   Head: Normocephalic and atraumatic  Eyes: Conjunctivae are normal    EOM grossly intact   Neck: Normal range of motion  Neck supple  No JVD present  Cardiovascular: Normal rate  Pulmonary/Chest: Effort normal    Abdominal: Soft  Musculoskeletal:   FROM, steady gait, cap refill brisk, strength and sensation grossly intact throughout   Neurological: He is alert and oriented to person, place, and time  Skin: Skin is warm and dry  Capillary refill takes less than 2 seconds  Rash noted  + wound, see attached pictures   Psychiatric: He has a normal mood and affect  His behavior is normal    Nursing note and vitals reviewed                Vital Signs  ED Triage Vitals   Temperature Pulse Respirations Blood Pressure SpO2   01/21/20 1410 01/21/20 1410 01/21/20 1410 01/21/20 1410 01/21/20 1410   100 2 °F (37 9 °C) 85 16 148/83 100 %      Temp Source Heart Rate Source Patient Position - Orthostatic VS BP Location FiO2 (%)   01/21/20 1410 01/21/20 1410 01/21/20 1410 01/21/20 1410 --   Tympanic Monitor Sitting Left arm       Pain Score       01/21/20 1507       6           Vitals:    01/21/20 1410 01/21/20 1507   BP: 148/83 119/79   Pulse: 85 96   Patient Position - Orthostatic VS: Sitting Sitting - Orthostatic VS         Visual Acuity      ED Medications  Medications   ketorolac (TORADOL) injection 15 mg (has no administration in time range)   ceFAZolin (ANCEF) IVPB (premix) 2,000 mg (has no administration in time range)   clindamycin (CLEOCIN) capsule 450 mg (has no administration in time range)   ibuprofen (MOTRIN) tablet 600 mg (has no administration in time range)   acetaminophen (TYLENOL) tablet 650 mg (has no administration in time range)       Diagnostic Studies  Results Reviewed     Procedure Component Value Units Date/Time    Blood culture #1 [[id number]]     Lab Status:  No result Specimen:  Blood     Blood culture #2 [[id number]]     Lab Status:  No result Specimen:  Blood     UA w Reflex to Microscopic w Reflex to Culture [[id number]]     Lab Status:  No result Specimen:  Urine, Other     CBC and differential [[id number]]     Lab Status:  No result Specimen:  Blood     Comprehensive metabolic panel [[id number]]     Lab Status:  No result Specimen:  Blood     Lactic acid x2 [[id number]]     Lab Status:  No result Specimen:  Blood     Lactic acid x2 [[id number]]     Lab Status:  No result Specimen:  Blood     Procalcitonin [[id number]]     Lab Status:  No result Specimen:  Blood     Protime-INR [[id number]]     Lab Status:  No result Specimen:  Blood     APTT [[id number]]     Lab Status:  No result Specimen:  Blood                  XR chest 2 views    (Results Pending)   XR hand 3+ views LEFT    (Results Pending)              Procedures  ECG 12 Lead Documentation Only  Date/Time: 1/21/2020 3:23 PM  Performed by: Neil Kendrick PA-C  Authorized by: Neil Kendrick PA-C     Indications / Diagnosis:  Concern for endocarditis  ECG reviewed by me, the ED Provider: yes    Patient location:  ED  Interpretation:     Interpretation: non-specific    Rate:     ECG rate:  85    ECG rate assessment: normal    Rhythm:     Rhythm: sinus rhythm    Ectopy:     Ectopy: none    QRS:     QRS axis:  Normal  Conduction:     Conduction: normal    ST segments:     ST segments:  Normal  T waves:     T waves: normal    Other findings:     Other findings: LAE    Comments:      qtc [id number]             ED Course                   Initial Sepsis Screening     Row Name 01/21/20 1525                Is the patient's history suggestive of a new or worsening infection? (!) Yes (Proceed)  -BG        Suspected source of infection  soft tissue  -BG        Are two or more of the following signs & symptoms of infection both present and new to the patient?         Indicate SIRS criteria  Hyperthemia > 38 3C (100 9F); Tachycardia > 90 bpm  -BG        If the answer is yes to both questions, suspicion of sepsis is present          If severe sepsis is present AND tissue hypoperfusion perists in the hour after fluid resuscitation or lactate > 4, the patient meets criteria for SEPTIC SHOCK          Are any of the following organ dysfunction criteria present within 6 hours of suspected infection and SIRS criteria that are NOT considered to be chronic conditions?         Organ dysfunction          Date of presentation of severe sepsis          Time of presentation of severe sepsis          Tissue hypoperfusion persists in the hour after crystalloid fluid administration, evidenced, by either:          Was hypotension present within one hour of the conclusion of crystalloid fluid administration?           Date of presentation of septic shock          Time of presentation of septic shock            User Key  (r) = Recorded By, (t) = Taken By, (c) = Cosigned By    Initials Name Provider Type    BG Tayo Giraldo PA-C Physician Assistant                  MDM  Number of Diagnoses or Management Options  Abscess:   Cellulitis of left upper extremity:   Cellulitis of neck:   Drug abuse [facility]):   Fever:   Visit for wound check:   Diagnosis management comments: 61-year-old male presenting for evaluation of wound check, patient initially presented with elevated temperature and history of IV drug use, septic workup was ordered and the plan would be to admit the patient for IV antibiotics and to rule out endocarditis, patient now refusing all blood work, will have patient sign out AMA and provide p o  Antibiotics along with dressing changes of the wound of the neck and the left hand, pt did sign the AMA paperwork and understands the risks of leaving at this time    Portions of the record may have been created with voice recognition software  Occasional wrong word or "sound a like" substitutions may have occurred due to the inherent limitations of voice recognition software  Read the chart carefully and recognize, using context, where substitutions have occurred  Disposition  Final diagnoses:   Cellulitis of left upper extremity   Cellulitis of neck   Abscess   Visit for wound check   Fever   Drug abuse ([address] )     Time reflects when diagnosis was documented in both MDM as applicable and the Disposition within this note     Time User Action Codes Description Comment    1/21/2020  3:41 PM Ramona Apo Add [L03 114] Cellulitis of left upper extremity     1/21/2020  3:41 PM Ramona Apo Add [L03 221] Cellulitis of neck     1/21/2020  3:41 PM Ramona Apo Add [L02 91] Abscess     1/21/2020  3:41 PM Ramona Apo Add [Z51 89] Visit for wound check     1/21/2020  3:42 PM Ramona Apo Add [R50 9] Fever     1/21/2020  3:42 PM Ramona Apo Add [F19 10] Drug abuse [facility])       ED Disposition     ED Disposition Condition Date/Time Comment    BOBBI Ureña Jan 21, 2020  3:41 PM Date: 1/21/2020  Patient: Lorri Torres  Admitted: 1/21/2020  2:07 PM  Attending Provider: Maddie Ko DO    Lorri Torres or his authorized caregiver has made the decision for the patient to leave the emergency department a gainst the advice of the emergency department staff  He or his authorized caregiver has been informed and understands the inherent risks, including death, sepsis, endocarditis  He or his authorized caregiver has decided to accept the responsibility  for this decision   Lorri Torres and all necessary parties have been advised that he may return for further evaluation or treatment  His condition at time of discharge was stable  Lorri Torres had current vital signs as follow s:  /79 (BP Location: Left arm)   Pulse 96   Temp 98 8 °F (37 1 °C) (Oral)   Resp 16   Wt 59 9 kg (132 lb 1 6 oz)         Follow-up Information     Follow up With Specialties Details Why Contact Info    Jonas Mckay MD [facility] Medicine   [facility]  [address]  [phone]            Patient's Medications   Discharge Prescriptions    ACETAMINOPHEN (TYLENOL) 325 MG TABLET    Take 2 tablets (650 mg total) by mouth every 6 (six) hours as needed for mild pain Take 2 tablets by mouth every 6 hours as needed for pain       Start Date: 1/21/2020 End Date: --       Order Dose: 650 mg       Quantity: 30 tablet    Refills: 0    CLINDAMYCIN (CLEOCIN) 150 MG CAPSULE    Take 3 capsules (450 mg total) by mouth every 6 (six) hours for 7 days       Start Date: 1/21/2020 End Date: 1/28/2020       Order Dose: 450 mg       Quantity: 84 capsule    Refills: 0    IBUPROFEN (MOTRIN) 400 MG TABLET    Take 1 tablet (400 mg total) by mouth every 8 (eight) hours as needed for mild pain       Start Date: 1/21/2020 End Date: --       Order Dose: 400 mg       Quantity: 30 tablet    Refills: 0     No discharge procedures on file      ED Provider  Electronically Signed by           Dimitri Bond PA-C  01/21/20 9680

## 2020-01-21 NOTE — ED NOTES
Pt stated that he does not want bloodwork at this time and that he only wants a bandaid and some abx and then he will return tomorrow   Provider notified     Susan Keita RN  01/21/20 [address] [place]  01/21/20 2355

## 2020-01-22 LAB
ATRIAL RATE: 85 BPM
P AXIS: 73 DEGREES
PR INTERVAL: 134 MS
QRS AXIS: 65 DEGREES
QRSD INTERVAL: 78 MS
QT INTERVAL: 354 MS
QTC INTERVAL: 421 MS
T WAVE AXIS: 53 DEGREES
VENTRICULAR RATE: 85 BPM

## 2020-01-22 PROCEDURE — 93010 ELECTROCARDIOGRAM REPORT: CPT | Performed by: INTERNAL MEDICINE

## 2020-01-31 ENCOUNTER — HOSPITAL ENCOUNTER (EMERGENCY)
Facility: HOSPITAL | Age: 30
Discharge: HOME | End: 2020-02-01
Attending: EMERGENCY MEDICINE | Admitting: EMERGENCY MEDICINE
Payer: COMMERCIAL

## 2020-01-31 VITALS
SYSTOLIC BLOOD PRESSURE: 120 MMHG | RESPIRATION RATE: 16 BRPM | WEIGHT: 132.28 LBS | OXYGEN SATURATION: 98 % | DIASTOLIC BLOOD PRESSURE: 65 MMHG | BODY MASS INDEX: 21.35 KG/M2 | HEART RATE: 85 BPM | TEMPERATURE: 98.1 F

## 2020-01-31 DIAGNOSIS — F11.10 HEROIN ABUSE (HCC): Primary | ICD-10-CM

## 2020-01-31 PROCEDURE — 99284 EMERGENCY DEPT VISIT MOD MDM: CPT | Performed by: EMERGENCY MEDICINE

## 2020-01-31 PROCEDURE — 99284 EMERGENCY DEPT VISIT MOD MDM: CPT

## 2020-02-01 RX ORDER — ACETAMINOPHEN 325 MG/1
650 TABLET ORAL ONCE
Status: COMPLETED | OUTPATIENT
Start: 2020-02-01 | End: 2020-02-01

## 2020-02-01 RX ADMIN — ACETAMINOPHEN 650 MG: 325 TABLET ORAL at 00:10

## 2020-02-01 NOTE — ED PROVIDER NOTES
23:10 The patient was seen and examined by HOST  Plan to follow up at the [facility] tomorrow morning for placement  The patient is agreeable to this plan  Strict return precautions provided       Harjit Winston MD  01/31/20 0253

## 2020-02-01 NOTE — ED PROVIDER NOTES
History  Chief Complaint   Patient presents with    Addiction Problem     I wanted to schedule something for treatment  Last used heroin 1/2 hour ago  Patient is a 60-year-old male well known to this facility who reports he is a heroin user and used approximately half an hour ago via intravenous injection into his right hand  Patient reports he is presenting today for evaluation for drug rehabilitation  Patient reports he is homeless  Patient reports sores to the back of his neck and over his body however denies any fevers or chills  Patient reports his drug use history has been long standing and chronic in length  Patient is requesting rehabilitation help and denies any suicidal ideation  History provided by:  Patient   used: No        Prior to Admission Medications   Prescriptions Last Dose Informant Patient Reported? Taking?    FLUoxetine HCl (PROZAC PO)   Yes No   Sig: Take by mouth   QUEtiapine (SEROquel) 200 mg tablet   Yes No   Sig: Take 200 mg by mouth daily at bedtime   acetaminophen (TYLENOL) 325 mg tablet   No No   Sig: Take 2 tablets (650 mg total) by mouth every 6 (six) hours as needed for mild pain Take 2 tablets by mouth every 6 hours as needed for pain   divalproex sodium (DEPAKOTE) 500 mg EC tablet   Yes No   Sig: Take by mouth every 8 (eight) hours   ibuprofen (MOTRIN) 400 mg tablet   No No   Sig: Take 1 tablet (400 mg total) by mouth every 8 (eight) hours as needed for mild pain   nicotine (NICODERM CQ) 21 mg/24 hr TD 24 hr patch   Yes No   Sig: Place 1 patch on the skin every 24 hours      Facility-Administered Medications: None       Past Medical History:   Diagnosis Date    Anxiety     Bipolar disorder ([facility] )     Depression     Drug abuse ([facility] )     Intermittent explosive disorder     Psychiatric disorder     Schizo-affective schizophrenia ([facility] )     Seizures ([facility] )        Past Surgical History:   Procedure Laterality Date    LEG SURGERY         History reviewed  No pertinent family history  I have reviewed and agree with the history as documented  Social History     Tobacco Use    Smoking status: Current Every Day Smoker     Packs/day: 0 50     Years: 12 00     Pack years: 6 00     Types: Cigarettes    Smokeless tobacco: Never Used   Substance Use Topics    Alcohol use: Not Currently    Drug use: Yes     Types: Prescription, Heroin, Marijuana        Review of Systems   Constitutional: Negative for chills, fatigue and fever  HENT: Negative for congestion, ear pain, rhinorrhea and sore throat  Eyes: Negative for redness  Respiratory: Negative for chest tightness and shortness of breath  Cardiovascular: Negative for chest pain and palpitations  Gastrointestinal: Negative for abdominal pain, nausea and vomiting  Genitourinary: Negative for dysuria and hematuria  Musculoskeletal: Negative  Skin: Positive for wound ( all over neck and body)  Negative for rash  Neurological: Negative for dizziness, syncope, light-headedness and numbness  Physical Exam  Physical Exam   Constitutional: He is oriented to person, place, and time  He appears well-developed and well-nourished  HENT:   Head: Normocephalic and atraumatic  Eyes: Pupils are equal, round, and reactive to light  Neck: Normal range of motion  Cardiovascular: Normal rate, regular rhythm and normal heart sounds  Pulmonary/Chest: Effort normal and breath sounds normal    Abdominal: Soft  There is no tenderness  There is no guarding  Lymphadenopathy:     He has no cervical adenopathy  Neurological: He is alert and oriented to person, place, and time  Skin: Skin is warm and dry  Capillary refill takes less than 2 seconds  Rash ( wounds all over body as well as pinpoint puncture wounds consistent with injected heroin use history) noted  Psychiatric: He has a normal mood and affect  Nursing note and vitals reviewed        Vital Signs  ED Triage Vitals   Temperature Pulse Respirations Blood Pressure SpO2   206 20   98 1 °F (36 7 °C) 98 18 124/67 97 %      Temp Source Heart Rate Source Patient Position - Orthostatic VS BP Location FiO2 (%)   20 --   Tympanic Monitor Sitting Left arm       Pain Score       20 0010       6           Vitals:    208   BP: 124/67 120/65   Pulse: 98 85   Patient Position - Orthostatic VS: Sitting Lying         Visual Acuity      ED Medications  Medications   acetaminophen (TYLENOL) tablet 650 mg (650 mg Oral Given 20 0010)       Diagnostic Studies  Results Reviewed     None                 No orders to display              Procedures  Procedures         ED Course  ED Course as of   Patient signed out to Dr Devi Garcia pending HOST evaluation  If patient decides to leave prior to HOST arrival patient has already had discharge instruction discussion and is free to leave of his own volition  MDM      Disposition  Final diagnoses:   Heroin abuse ([address] )     Time reflects when diagnosis was documented in both MDM as applicable and the Disposition within this note     Time User Action Codes Description Comment    2020 10:52 PM Chinedu Patricia Add [F11 10] Heroin abuse [facility])       ED Disposition     ED Disposition Condition Date/Time Comment    Discharge Stable  10:51 PM Saulo Rivera discharge to home/self care              Follow-up Information     Follow up With Specialties Details Why [address] in 1 day  Please call HOST in the [phone]          Discharge Medication List as of 2020 10:53 PM      CONTINUE these medications which have NOT CHANGED    Details   acetaminophen (TYLENOL) 325 mg tablet Take 2 tablets (650 mg total) by mouth every 6 (six) hours as needed for mild pain Take 2 tablets by mouth every 6 hours as needed for pain, Starting Tue 1/21/2020, Print      divalproex sodium (DEPAKOTE) 500 mg EC tablet Take by mouth every 8 (eight) hours, Historical Med      FLUoxetine HCl (PROZAC PO) Take by mouth, Historical Med      ibuprofen (MOTRIN) 400 mg tablet Take 1 tablet (400 mg total) by mouth every 8 (eight) hours as needed for mild pain, Starting Tue 1/21/2020, Print      nicotine (NICODERM CQ) 21 mg/24 hr TD 24 hr patch Place 1 patch on the skin every 24 hours, Historical Med      QUEtiapine (SEROquel) 200 mg tablet Take 200 mg by mouth daily at bedtime, Historical Med           No discharge procedures on file      ED Provider  Electronically Signed by           Yanelis Stanley PA-C  02/02/20 1100

## 2020-02-01 NOTE — ED NOTES
Patient easily awakened - discussed his discharge planning - patient will take a shower first and then arrangements have been made at the [facility] for him to return tonight  Patient is awake and alert and oriented  Does not want to harm himself or others and wants to seek treatment for his heroin abuse       Soledad Garcia RN  01/31/20 0583

## 2020-02-01 NOTE — ED NOTES
Pt taken to bathroom to utilize shower and given paper scrubs to don     [address] [facility]  02/01/20 0022

## 2020-02-01 NOTE — ED NOTES
Pt given meal tray tolerated well  Pt given toiletries to be supervised to take a shower once done       Sherrill MCKENNA [facility]  01/31/20 2200

## 2020-02-01 NOTE — ED NOTES
Patient was assisted with his shower, open sores were dressed and patient was medicated with tylenol for his c/o a sore throat prior to discharge       Nichol Yuan RN  02/01/20 0020

## 2020-02-01 NOTE — ED NOTES
Adelina met with Pt from [facility]  Plan is for Pt to go to the [facility] tomorrow after going to his brother's home  He was informed to contact [facility] at that time at [phone]  Clinical was provided to [facility], which will be sent to [facility] and followed by [facility]  Pt does not meet additional criteria for admission and will be discharged

## 2020-02-03 NOTE — ED ATTENDING ATTESTATION
I was the attending physician on duty at the time the patient visited the emergency department  The patient was evaluated and dispositioned by the APC  I was personally available for consultation  I am administratively signing the chart after the fact      Charlie Varela MD

## 2020-02-04 ENCOUNTER — TELEPHONE (OUTPATIENT)
Dept: FAMILY MEDICINE CLINIC | Facility: CLINIC | Age: 30
End: 2020-02-04

## 2020-02-04 NOTE — TELEPHONE ENCOUNTER
Pt was a Dr Michael Salomon patient from [address] and has never been seen in this office by Dr Michael Salomon or any other provider  Can patient be taken off the panel for [facility]   I checked our [address] list and he is listed again under Dr Michael Salomon not the other providers

## 2020-02-05 ENCOUNTER — HOSPITAL ENCOUNTER (EMERGENCY)
Facility: HOSPITAL | Age: 30
Discharge: HOME | End: 2020-02-05
Attending: EMERGENCY MEDICINE | Admitting: EMERGENCY MEDICINE
Payer: COMMERCIAL

## 2020-02-05 VITALS
WEIGHT: 130.29 LBS | SYSTOLIC BLOOD PRESSURE: 143 MMHG | DIASTOLIC BLOOD PRESSURE: 82 MMHG | BODY MASS INDEX: 20.45 KG/M2 | HEIGHT: 67 IN | HEART RATE: 105 BPM | RESPIRATION RATE: 18 BRPM | OXYGEN SATURATION: 97 % | TEMPERATURE: 98.6 F

## 2020-02-05 DIAGNOSIS — F19.10 SUBSTANCE ABUSE (HCC): ICD-10-CM

## 2020-02-05 DIAGNOSIS — Z59.00 HOMELESS: Primary | ICD-10-CM

## 2020-02-05 PROCEDURE — 99284 EMERGENCY DEPT VISIT MOD MDM: CPT

## 2020-02-05 PROCEDURE — 99283 EMERGENCY DEPT VISIT LOW MDM: CPT | Performed by: EMERGENCY MEDICINE

## 2020-02-05 RX ORDER — IBUPROFEN 400 MG/1
400 TABLET ORAL ONCE
Status: COMPLETED | OUTPATIENT
Start: 2020-02-05 | End: 2020-02-05

## 2020-02-05 RX ADMIN — IBUPROFEN 400 MG: 400 TABLET ORAL at 01:45

## 2020-02-05 SDOH — ECONOMIC STABILITY - HOUSING INSECURITY: HOMELESSNESS UNSPECIFIED: Z59.00

## 2020-02-05 NOTE — ED NOTES
Patient presented with outpatient resources for drug and alcohol intake assessment  Patient encouraged to complete assessment with Drug and Alcohol Intake for long term rehab facility placement  Patient aware that he needs to follow through with services upon discharge of treating facility  Patient provided with food and set up with Rickie to take to [facility] shelter

## 2020-02-05 NOTE — ED NOTES
Provider in to speak with pt, crisis joining at this time   Pt will be d/c due to not criteria met to keep pt in the [facility]  02/05/20 0136

## 2020-02-05 NOTE — ED PROVIDER NOTES
History  Chief Complaint   Patient presents with    Psychiatric Evaluation     Patient is a 67-year-old male well known to me who presents requesting admission to an inpatient "mental health institute "  Patient denies any suicidal ideation no homicidal ideation and no auditory visual hallucinations  Denies any recent drug use today  Does admit to smoking K2 yesterday and has been clean from heroin for 2 days  Denies any alcohol  Cannot remember the last time he was hospitalized nor the last time that he went sought outpatient therapist or psychiatrist   Patient does not follow-up with any medication nor has he followed up with any rehab due to his numerous previous visits and host evaluations  Patient is that he is still currently homeless  And only will stay intermittently at the [facility]  Patient has been here multiple times in the past for various complaints  Prior to Admission Medications   Prescriptions Last Dose Informant Patient Reported? Taking?    FLUoxetine HCl (PROZAC PO)   Yes No   Sig: Take by mouth   QUEtiapine (SEROquel) 200 mg tablet   Yes No   Sig: Take 200 mg by mouth daily at bedtime   acetaminophen (TYLENOL) 325 mg tablet   No No   Sig: Take 2 tablets (650 mg total) by mouth every 6 (six) hours as needed for mild pain Take 2 tablets by mouth every 6 hours as needed for pain   divalproex sodium (DEPAKOTE) 500 mg EC tablet   Yes No   Sig: Take by mouth every 8 (eight) hours   ibuprofen (MOTRIN) 400 mg tablet   No No   Sig: Take 1 tablet (400 mg total) by mouth every 8 (eight) hours as needed for mild pain   nicotine (NICODERM CQ) 21 mg/24 hr TD 24 hr patch   Yes No   Sig: Place 1 patch on the skin every 24 hours      Facility-Administered Medications: None       Past Medical History:   Diagnosis Date    Anxiety     [facility]     Depression     Drug abuse ([facility] )     Intermittent explosive disorder     Psychiatric disorder     Schizo-affective schizophrenia ([facility] )     Seizures ([facility] )        Past Surgical History:   Procedure Laterality Date    LEG SURGERY         History reviewed  No pertinent family history  I have reviewed and agree with the history as documented  Social History     Tobacco Use    Smoking status: Current Every Day Smoker     Packs/day: 0 50     Years: 12 00     Pack years: 6 00     Types: Cigarettes    Smokeless tobacco: Never Used   Substance Use Topics    Alcohol use: Not Currently    Drug use: Yes     Types: Prescription, Heroin, Marijuana        Review of Systems   Constitutional: Negative  HENT: Negative  Eyes: Negative  Respiratory: Negative  Cardiovascular: Negative  Gastrointestinal: Negative  Endocrine: Negative  Genitourinary: Negative  Musculoskeletal: Negative  Skin: Positive for wound  Allergic/Immunologic: Negative  Neurological: Negative  Hematological: Negative  Psychiatric/Behavioral: Positive for dysphoric mood  Negative for suicidal ideas  All other systems reviewed and are negative  Physical Exam  Physical Exam   Constitutional: He is oriented to person, place, and time  He appears well-developed and well-nourished  HENT:   Head: Normocephalic  Right Ear: External ear normal    Left Ear: External ear normal    Nose: Nose normal    Mouth/Throat: Oropharynx is clear and moist    Neck: Normal range of motion  Neck supple  Cardiovascular: Normal rate, regular rhythm, normal heart sounds and intact distal pulses  Pulmonary/Chest: Effort normal and breath sounds normal    Abdominal: Soft  Bowel sounds are normal    Neurological: He is alert and oriented to person, place, and time  Skin: Skin is warm  Capillary refill takes less than 2 seconds  Patient with multiple picking sores 1 large patchy excoriation on right posterior neck  No surrounding erythema or drainage     Psychiatric: His speech is normal and behavior is normal  Judgment and thought content normal  His affect is blunt  Cognition and memory are normal  He expresses no homicidal and no suicidal ideation  He is attentive  Nursing note and vitals reviewed  Vital Signs  ED Triage Vitals   Temperature Pulse Respirations Blood Pressure SpO2   02/05/20 0123 02/05/20 0123 02/05/20 0123 02/05/20 0123 02/05/20 0123   98 6 °F (37 °C) 105 18 143/82 97 %      Temp Source Heart Rate Source Patient Position - Orthostatic VS BP Location FiO2 (%)   02/05/20 0120 02/05/20 0120 02/05/20 0120 02/05/20 0120 --   Tympanic Monitor Sitting Left arm       Pain Score       02/05/20 0123       7           Vitals:    02/05/20 0120 02/05/20 0123   BP:  143/82   Pulse:  105   Patient Position - Orthostatic VS: Sitting Sitting         Visual Acuity      ED Medications  Medications   ibuprofen (MOTRIN) tablet 400 mg (has no administration in time range)       Diagnostic Studies  Results Reviewed     None                 No orders to display              Procedures  Procedures         ED Course  ED Course as of Feb 05 0141 Wed Feb 05, 2020   0140 Patient seen by crisis  Given outpatient resources stressed again to go to intake for proper placement  Patient asking for Motrin  Will provide will also try and find food tray  Will discharge                                    MDM  Number of Diagnoses or Management Options  Homeless:   Substance abuse [facility]):      Amount and/or Complexity of Data Reviewed  Review and summarize past medical records: yes          Disposition  Final diagnoses:   Homeless   Substance abuse ([facility] )     Time reflects when diagnosis was documented in both MDM as applicable and the Disposition within this note     Time User Action Codes Description Comment    2/5/2020  1:40 AM Kaykay Age Add [Z59 0] Homeless     2/5/2020  1:40 AM Kaykay Age Add [F19 10] Substance abuse [facility])       ED Disposition     ED Disposition Condition Date/Time Comment    Discharge Stable Wed Feb 5, 2020  1:40 AM Diane Rivera discharge to home/self care  Follow-up Information     Follow up With Specialties Details Why Contact Info    Jorge A Diaz MD [address]  [phone]            Patient's Medications   Discharge Prescriptions    No medications on file     No discharge procedures on file      ED Provider  Electronically Signed by           Sandrita Herrera MD  02/05/20 6317

## 2020-02-05 NOTE — ED NOTES
Pt states "No I don't need those papers " referring to the resources provided to him upon discharge  Pt walked to Rickie Rodriguez [facility]  02/05/20 4238

## 2020-02-18 NOTE — TELEPHONE ENCOUNTER
Lucia,        I am more than happy to update the PCP but do feel we should try to coordinate care for this patient based on chart alerts etc  How would you like to proceed? Send to Care Management? [facility]?

## 2020-03-07 ENCOUNTER — HOSPITAL ENCOUNTER (EMERGENCY)
Facility: HOSPITAL | Age: 30
Discharge: LEFT BEFORE DISCHARGE | End: 2020-03-07
Attending: EMERGENCY MEDICINE | Admitting: EMERGENCY MEDICINE
Payer: COMMERCIAL

## 2020-03-07 VITALS
TEMPERATURE: 98.1 F | HEART RATE: 77 BPM | SYSTOLIC BLOOD PRESSURE: 120 MMHG | BODY MASS INDEX: 21.46 KG/M2 | RESPIRATION RATE: 16 BRPM | OXYGEN SATURATION: 97 % | WEIGHT: 137 LBS | DIASTOLIC BLOOD PRESSURE: 66 MMHG
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
## 2020-03-07 PROCEDURE — 99283 EMERGENCY DEPT VISIT LOW MDM: CPT

## 2020-03-07 PROCEDURE — 99282 EMERGENCY DEPT VISIT SF MDM: CPT | Performed by: PHYSICIAN ASSISTANT
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
## 2020-03-09 ENCOUNTER — HOSPITAL ENCOUNTER (EMERGENCY)
Facility: HOSPITAL | Age: 30
Discharge: LEFT BEFORE DISCHARGE | End: 2020-03-10
Attending: EMERGENCY MEDICINE | Admitting: EMERGENCY MEDICINE
Payer: COMMERCIAL

## 2020-03-09 DIAGNOSIS — F19.10 SUBSTANCE ABUSE (HCC): Primary | ICD-10-CM

## 2020-03-09 LAB
AMPHETAMINES SERPL QL SCN: NEGATIVE
BARBITURATES UR QL: NEGATIVE
BENZODIAZ UR QL: NEGATIVE
COCAINE UR QL: POSITIVE
ETHANOL EXG-MCNC: 0 MG/DL
METHADONE UR QL: NEGATIVE
OPIATES UR QL SCN: POSITIVE
PCP UR QL: NEGATIVE
THC UR QL: NEGATIVE

## 2020-03-09 PROCEDURE — 82075 ASSAY OF BREATH ETHANOL: CPT | Performed by: EMERGENCY MEDICINE

## 2020-03-09 PROCEDURE — 99283 EMERGENCY DEPT VISIT LOW MDM: CPT | Performed by: EMERGENCY MEDICINE

## 2020-03-09 PROCEDURE — 99284 EMERGENCY DEPT VISIT MOD MDM: CPT

## 2020-03-09 PROCEDURE — 80307 DRUG TEST PRSMV CHEM ANLYZR: CPT | Performed by: EMERGENCY MEDICINE

## 2020-03-09 PROCEDURE — NC001 PR NO CHARGE: Performed by: EMERGENCY MEDICINE

## 2020-03-09 RX ORDER — ONDANSETRON 4 MG/1
4 TABLET, ORALLY DISINTEGRATING ORAL ONCE
Status: COMPLETED | OUTPATIENT
Start: 2020-03-09 | End: 2020-03-09

## 2020-03-09 RX ADMIN — ONDANSETRON 4 MG: 4 TABLET, ORALLY DISINTEGRATING ORAL at 22:59

## 2020-03-09 NOTE — ED NOTES
Pt called 2x with no answer either time, pt not in bathroom or outside     Jenniffer Gabriel RN  03/09/20 0461

## 2020-03-09 NOTE — ED CARE HANDOFF
Emergency Department Sign Out Note        Sign out and transfer of care from Dr Marie Navarro  See Separate Emergency Department note  The patient, Wayne Fernández, was evaluated by the previous provider for HOST referral     Workup Completed:  Seen by HOST    ED Course / Workup Pending (followup): Awaiting call back from HOST  Procedures  MDM    Disposition  Final diagnoses:   None     ED Disposition     None      Follow-up Information    None       Patient's Medications   Discharge Prescriptions    No medications on file     No discharge procedures on file         ED Provider  Electronically Signed by     Fab Rosario MD  03/09/20 9751

## 2020-03-09 NOTE — ED PROVIDER NOTES
History  Chief Complaint   Patient presents with    Drug / Alcohol Assessment     pt requesting HOST services     36 yo male with a history of anxiety, depression, bipolar disorder, and polysubstance abuse presents to the ED requesting placement in a drug rehab facility  The patient states he has a "court order" to get an evaluation  No SI, HI, or hallucinations  He was seen at an outside ED earlier today for this same issue but eloped before HOST arrived  No other complaints  Prior to Admission Medications   Prescriptions Last Dose Informant Patient Reported? Taking? FLUoxetine HCl (PROZAC PO)   Yes No   Sig: Take by mouth   QUEtiapine (SEROquel) 200 mg tablet   Yes No   Sig: Take 200 mg by mouth daily at bedtime   acetaminophen (TYLENOL) 325 mg tablet   No No   Sig: Take 2 tablets (650 mg total) by mouth every 6 (six) hours as needed for mild pain Take 2 tablets by mouth every 6 hours as needed for pain   divalproex sodium (DEPAKOTE) 500 mg EC tablet   Yes No   Sig: Take by mouth every 8 (eight) hours   ibuprofen (MOTRIN) 400 mg tablet   No No   Sig: Take 1 tablet (400 mg total) by mouth every 8 (eight) hours as needed for mild pain   nicotine (NICODERM CQ) 21 mg/24 hr TD 24 hr patch   Yes No   Sig: Place 1 patch on the skin every 24 hours      Facility-Administered Medications: None       Past Medical History:   Diagnosis Date    Anxiety     [facility]     Depression     [facility]     Intermittent explosive disorder     Psychiatric disorder     Schizo-affective schizophrenia [facility] )     Seizures [facility] )        Past Surgical History:   Procedure Laterality Date    LEG SURGERY         History reviewed  No pertinent family history  I have reviewed and agree with the history as documented      E-Cigarette/Vaping     E-Cigarette/Vaping Substances     Social History     Tobacco Use    Smoking status: Current Every Day Smoker     Packs/day: 0 25     Years: 12 00     Pack years: 3 00 Types: Cigarettes    Smokeless tobacco: Never Used   Substance Use Topics    Alcohol use: Not Currently    Drug use: Yes     Types: Prescription, Heroin, Marijuana     Comment: reports using 1-2 bags of heroin a day        Review of Systems   Constitutional: Negative for chills and fever  HENT: Negative for sore throat  Respiratory: Negative for cough and shortness of breath  Cardiovascular: Negative for chest pain and palpitations  Gastrointestinal: Negative for abdominal pain, diarrhea, nausea and vomiting  Endocrine: Negative for cold intolerance and heat intolerance  Genitourinary: Negative for dysuria and flank pain  Musculoskeletal: Negative for back pain  Skin: Negative for rash  Allergic/Immunologic: Negative for immunocompromised state  Neurological: Negative for headaches  Hematological: Negative for adenopathy  Psychiatric/Behavioral: Negative for sleep disturbance and suicidal ideas  The patient is not nervous/anxious  Physical Exam  Physical Exam   Constitutional: He is oriented to person, place, and time  He appears well-developed and well-nourished  No distress  HENT:   Head: Normocephalic and atraumatic  Eyes: Pupils are equal, round, and reactive to light  EOM are normal    Neck: Normal range of motion  Neck supple  Cardiovascular: Normal rate and regular rhythm  Pulmonary/Chest: Effort normal and breath sounds normal  No respiratory distress  Abdominal: Soft  He exhibits no distension  There is no tenderness  Musculoskeletal: Normal range of motion  He exhibits no edema  Neurological: He is alert and oriented to person, place, and time  Skin: Skin is warm and dry  Psychiatric: He has a normal mood and affect  He is not actively hallucinating  He expresses no homicidal and no suicidal ideation  He expresses no suicidal plans and no homicidal plans         Vital Signs  ED Triage Vitals [03/09/20 1733]   Temperature Pulse Respirations Blood Pressure SpO2   97 9 °F (36 6 °C) 97 16 152/95 94 %      Temp Source Heart Rate Source Patient Position - Orthostatic VS BP Location FiO2 (%)   Tympanic Monitor Sitting Left arm --      Pain Score       --           Vitals:    03/09/20 1733   BP: 152/95   Pulse: 97   Patient Position - Orthostatic VS: Sitting         Visual Acuity      ED Medications  Medications - No data to display    Diagnostic Studies  Results Reviewed     Procedure Component Value Units Date/Time    Rapid drug screen, urine [[id number]] Collected:  03/09/20 1912    Lab Status: In process Specimen:  Urine, Catheter Updated:  03/09/20 1919    POCT alcohol breath test [[id number]]  (Normal) Resulted:  03/09/20 1917    Lab Status:  Final result Updated:  03/09/20 1917     EXTBreath Alcohol 0 00                 No orders to display              Procedures  Procedures         ED Course                               MDM  Number of Diagnoses or Management Options  Diagnosis management comments: The patient is well appearing with a benign exam and stable vital signs  He is pleasant and cooperative with the exam/history  Cases discussed with Crisis --> HOST was contacted to provide an assessment  Will continue to monitor in the ED  Amount and/or Complexity of Data Reviewed  Clinical lab tests: ordered and reviewed    Patient Progress  Patient progress: stable        Disposition  Final diagnoses:   None     ED Disposition     None      Follow-up Information    None         Patient's Medications   Discharge Prescriptions    No medications on file     No discharge procedures on file      PDMP Review     None          ED Provider  Electronically Signed by           Carlos Cruz MD  03/09/20 5542

## 2020-03-09 NOTE — ED NOTES
Cristy from HOST completed evaluation  Clinical sent to [facility]  HOST to follow up with ED to discuss disposition

## 2020-03-09 NOTE — ED PROVIDER NOTES
History  Chief Complaint   Patient presents with    Drug / Alcohol Assessment     pt requesting HOST services     HPI    Prior to Admission Medications   Prescriptions Last Dose Informant Patient Reported? Taking? FLUoxetine HCl (PROZAC PO)   Yes No   Sig: Take by mouth   QUEtiapine (SEROquel) 200 mg tablet   Yes No   Sig: Take 200 mg by mouth daily at bedtime   acetaminophen (TYLENOL) 325 mg tablet   No No   Sig: Take 2 tablets (650 mg total) by mouth every 6 (six) hours as needed for mild pain Take 2 tablets by mouth every 6 hours as needed for pain   divalproex sodium (DEPAKOTE) 500 mg EC tablet   Yes No   Sig: Take by mouth every 8 (eight) hours   ibuprofen (MOTRIN) 400 mg tablet   No No   Sig: Take 1 tablet (400 mg total) by mouth every 8 (eight) hours as needed for mild pain   nicotine (NICODERM CQ) 21 mg/24 hr TD 24 hr patch   Yes No   Sig: Place 1 patch on the skin every 24 hours      Facility-Administered Medications: None       Past Medical History:   Diagnosis Date    Anxiety     Bipolar disorder ([facility] )     Depression     Drug abuse ([place] [address] )     Intermittent explosive disorder     Psychiatric disorder     Schizo-affective schizophrenia ([facility] )     Seizures ([facility] )        Past Surgical History:   Procedure Laterality Date    LEG SURGERY         History reviewed  No pertinent family history  I have reviewed and agree with the history as documented      E-Cigarette/Vaping     E-Cigarette/Vaping Substances     Social History     Tobacco Use    Smoking status: Current Every Day Smoker     Packs/day: 0 25     Years: 12 00     Pack years: 3 00     Types: Cigarettes    Smokeless tobacco: Never Used   Substance Use Topics    Alcohol use: Not Currently    Drug use: Yes     Types: Prescription, Heroin, Marijuana     Comment: reports using 1-2 bags of heroin a day        Review of Systems    Physical Exam  Physical Exam    Vital Signs  ED Triage Vitals [03/09/20 1733]   Temperature Pulse Respirations Blood Pressure SpO2   97 9 °F (36 6 °C) 97 16 152/95 94 %      Temp Source Heart Rate Source Patient Position - Orthostatic VS BP Location FiO2 (%)   Tympanic Monitor Sitting Left arm --      Pain Score       --           Vitals:    03/09/20 1733 03/09/20 2047 03/09/20 2252   BP: 152/95 139/72 138/83   Pulse: 97 84 (!) 106   Patient Position - Orthostatic VS: Sitting Sitting Sitting         Visual Acuity      ED Medications  Medications   ondansetron (ZOFRAN-ODT) dispersible tablet 4 mg (has no administration in time range)       Diagnostic Studies  Results Reviewed     Procedure Component Value Units Date/Time    Rapid drug screen, urine [[id number]]  (Abnormal) Collected:  03/09/20 1912    Lab Status:  Final result Specimen:  Urine, Catheter Updated:  03/09/20 1940     Amph/Meth UR Negative     Barbiturate Ur Negative     Benzodiazepine Urine Negative     Cocaine Urine Positive     Methadone Urine Negative     Opiate Urine Positive     PCP Ur Negative     THC Urine Negative    Narrative:       Presumptive report  If requested, specimen will be sent to reference lab for confirmation  FOR MEDICAL PURPOSES ONLY  IF CONFIRMATION NEEDED PLEASE CONTACT THE LAB WITHIN 5 DAYS  Drug Screen Cutoff Levels:  AMPHETAMINE/METHAMPHETAMINES  1000 ng/mL  BARBITURATES     200 ng/mL  BENZODIAZEPINES     200 ng/mL  COCAINE      300 ng/mL  METHADONE      300 ng/mL  OPIATES      300 ng/mL  PHENCYCLIDINE     25 ng/mL  THC       50 ng/mL      POCT alcohol breath test [[id number]]  (Normal) Resulted:  03/09/20 1917    Lab Status:  Final result Updated:  03/09/20 1917     EXTBreath Alcohol 0 00                 No orders to display              Procedures  Procedures         ED Course  ED Course as of Mar 09 2256   Mon Mar 09, 2020   2103 No the sores on the patient require incision drainage  Common picking sores  No medical attention outside of routine wound care cleansing with soap water only        [address] [place] reviewing      3481 Will be transported to [facility] in the AM                                    MDM      Disposition  Final diagnoses:   Substance abuse ([address] )     Time reflects when diagnosis was documented in both MDM as applicable and the Disposition within this note     Time User Action Codes Description Comment    3/9/2020 10:55 PM Freda Lange Add [F19 10] Substance abuse [facility])       ED Disposition     None      Follow-up Information    None         Patient's Medications   Discharge Prescriptions    No medications on file     No discharge procedures on file      PDMP Review     None          ED Provider  Electronically Signed by           Octavio Amin MD  03/09/20 3208

## 2020-03-10 VITALS
BODY MASS INDEX: 21.49 KG/M2 | DIASTOLIC BLOOD PRESSURE: 75 MMHG | OXYGEN SATURATION: 98 % | TEMPERATURE: 97.7 F | HEART RATE: 109 BPM | WEIGHT: 137.2 LBS | SYSTOLIC BLOOD PRESSURE: 126 MMHG | RESPIRATION RATE: 16 BRPM

## 2020-03-10 RX ORDER — LORAZEPAM 0.5 MG/1
1 TABLET ORAL ONCE
Status: COMPLETED | OUTPATIENT
Start: 2020-03-10 | End: 2020-03-10

## 2020-03-10 RX ADMIN — LORAZEPAM 1 MG: 0.5 TABLET ORAL at 10:34

## 2020-03-10 NOTE — ED NOTES
Spoke with Darryn Flores from [facility] accepted Pt for a bed tomorrow  Pt will remain in the ED with MD approval until tomorrow morning  AM crisis worker will follow up with HOST to get transportation information  Smith Reyes will provide transportation tomorrow

## 2020-03-10 NOTE — ED NOTES
Pt requesting to have neck wound bandage changed  Pt refused any cleanser use on them  Rinsed with normal saline, non-stick steri pads and gauze applied to area secured with paper tape  Pt denies cleaning wound outside of hospital, pt states "I don't clean it   I just go from hospital to hospital "     Bhumika Vann, DENISHA  03/10/20 5829

## 2020-03-10 NOTE — ED NOTES
Spoke with Odean Kehr from [address] [place] [address] is reviewing Pt clinical at this time  Kieran Davey is requesting a note from MD regarding Pt neck wound  Crisis worker will follow up and send documentation to Kieran Davey  Faxed MD note to Kieran Davey  Contacted HOST to provide update

## 2020-03-10 NOTE — ED NOTES
Pt asking for meds "I'm really dope sick and if you don't give me something now I might just have to leave, I can't wait around while i'm dope sick"  Provider notified     Steve Keene, DENISHA  03/10/20 1044

## 2020-03-10 NOTE — ED NOTES
Crisis worker spoke with patient to encourage to stay for treatment; patient states he is leaving rather than act up and get put in long-term  AMA form signed       Diana Briceno RN  03/10/20 0868

## 2020-03-10 NOTE — ED NOTES
Pt refused toiletries that were offered, pt states he will shower when he is transferred to facility "meds are giving there"       Britni Mascorro  03/10/20 1024

## 2020-03-10 NOTE — ED NOTES
Pt snoring upon entering room, pt awoken for VS then quickly fell back asleep at this time       Kirk Mae RN  03/10/20 7976

## 2020-03-10 NOTE — ED NOTES
Patient states having withdrawals and wants to leave; voiced understanding of waiting on [facility] to transport  Spoke with doctor to get medication; given soda and chips       Tabitha Mariee RN  03/10/20 1743

## 2020-03-10 NOTE — ED NOTES
Pt resting comfortably, is content at this time and denies needing anything       Raysa , DENISHA  03/10/20 0827

## 2020-03-18 ENCOUNTER — HOSPITAL ENCOUNTER (EMERGENCY)
Facility: HOSPITAL | Age: 30
Discharge: HOME | End: 2020-03-18
Attending: EMERGENCY MEDICINE | Admitting: EMERGENCY MEDICINE
Payer: COMMERCIAL

## 2020-03-18 VITALS
OXYGEN SATURATION: 96 % | WEIGHT: 130.73 LBS | DIASTOLIC BLOOD PRESSURE: 82 MMHG | RESPIRATION RATE: 18 BRPM | HEART RATE: 76 BPM | BODY MASS INDEX: 20.48 KG/M2 | TEMPERATURE: 96.8 F | SYSTOLIC BLOOD PRESSURE: 120 MMHG

## 2020-03-18 DIAGNOSIS — F31.9 BIPOLAR DISORDER (HCC): Primary | ICD-10-CM

## 2020-03-18 DIAGNOSIS — Z59.00 HOMELESS: ICD-10-CM

## 2020-03-18 DIAGNOSIS — F41.9 ANXIETY: ICD-10-CM

## 2020-03-18 DIAGNOSIS — F11.10 HEROIN ABUSE (HCC): ICD-10-CM

## 2020-03-18 PROCEDURE — 99284 EMERGENCY DEPT VISIT MOD MDM: CPT | Performed by: EMERGENCY MEDICINE

## 2020-03-18 PROCEDURE — 99284 EMERGENCY DEPT VISIT MOD MDM: CPT

## 2020-03-18 SDOH — ECONOMIC STABILITY - HOUSING INSECURITY: HOMELESSNESS UNSPECIFIED: Z59.00

## 2020-03-18 NOTE — ED PROVIDER NOTES
History  Chief Complaint   Patient presents with    Psychiatric Evaluation     34 y/o W male here with history of bipolar depression, anxiety, and polysubstance abuse - requesting drug rehab once again  Patient recently seen on 3/9 for same; was scheduled to go to [place], but patient left AMA prior to  Denies SI/HI; no AH/VH  Admits to using heroin last night PTA; denies alcohol or other illicit drugs  Prior to Admission Medications   Prescriptions Last Dose Informant Patient Reported? Taking? FLUoxetine HCl (PROZAC PO)   Yes No   Sig: Take by mouth   QUEtiapine (SEROquel) 200 mg tablet   Yes No   Sig: Take 200 mg by mouth daily at bedtime   acetaminophen (TYLENOL) 325 mg tablet   No No   Sig: Take 2 tablets (650 mg total) by mouth every 6 (six) hours as needed for mild pain Take 2 tablets by mouth every 6 hours as needed for pain   divalproex sodium (DEPAKOTE) 500 mg EC tablet   Yes No   Sig: Take by mouth every 8 (eight) hours   ibuprofen (MOTRIN) 400 mg tablet   No No   Sig: Take 1 tablet (400 mg total) by mouth every 8 (eight) hours as needed for mild pain   nicotine (NICODERM CQ) 21 mg/24 hr TD 24 hr patch   Yes No   Sig: Place 1 patch on the skin every 24 hours      Facility-Administered Medications: None       Past Medical History:   Diagnosis Date    Anxiety     [facility] disorder [facility] )     Depression     Drug abuse ([facility] )     Intermittent explosive disorder     Psychiatric disorder     Schizo-affective schizophrenia ([facility] )     Seizures ([facility] )        Past Surgical History:   Procedure Laterality Date    LEG SURGERY         History reviewed  No pertinent family history  I have reviewed and agree with the history as documented      E-Cigarette/Vaping     E-Cigarette/Vaping Substances     Social History     Tobacco Use    Smoking status: Current Every Day Smoker     Packs/day: 0 25     Years: 12 00     Pack years: 3 00     Types: Cigarettes    Smokeless tobacco: Never Used   Substance Use Topics    Alcohol use: Not Currently    Drug use: Yes     Types: Prescription, Heroin, Marijuana     Comment: reports using 1-2 bags of heroin a day        Review of Systems   Psychiatric/Behavioral: Positive for behavioral problems and dysphoric mood  The patient is nervous/anxious  All other systems reviewed and are negative  Physical Exam  Physical Exam   Constitutional: He is oriented to person, place, and time  He appears well-developed and well-nourished  HENT:   Head: Normocephalic and atraumatic  Nose: Nose normal    Eyes: Pupils are equal, round, and reactive to light  Conjunctivae and EOM are normal    Neck: Normal range of motion  Neck supple  Cardiovascular: Normal rate  Pulmonary/Chest: Effort normal and breath sounds normal    Abdominal: Soft  Bowel sounds are normal    Musculoskeletal: Normal range of motion  Neurological: He is alert and oriented to person, place, and time  No cranial nerve deficit  Skin: Skin is warm and dry  Capillary refill takes less than 2 seconds  Psychiatric: He has a normal mood and affect  Vitals reviewed  Vital Signs  ED Triage Vitals [03/18/20 0024]   Temperature Pulse Respirations Blood Pressure SpO2   (!) 96 8 °F (36 °C) 76 18 120/82 96 %      Temp Source Heart Rate Source Patient Position - Orthostatic VS BP Location FiO2 (%)   Tympanic Monitor Sitting Left arm --      Pain Score       --           Vitals:    03/18/20 0024   BP: 120/82   Pulse: 76   Patient Position - Orthostatic VS: Sitting         Visual Acuity      ED Medications  Medications - No data to display    Diagnostic Studies  Results Reviewed     None                 No orders to display              Procedures  Procedures         ED Course  ED Course as of Mar 18 0626   Wed Mar 18, 2020   0035 Per Crisis, patient will not be accepted back to [facility] due to him leaving AMA                                        [facility]      Disposition  Final diagnoses:   Bipolar disorder ([facility] )   Anxiety   Heroin abuse ([facility] )   Homeless     Time reflects when diagnosis was documented in both MDM as applicable and the Disposition within this note     Time User Action Codes Description Comment    3/18/2020 12:34 AM Avery Copier Add [F31 9] Bipolar disorder ([facility] )     3/18/2020 12:34 AM Avery Copier Add [F41 9] Anxiety     3/18/2020 12:34 AM Avery Copier Add [F11 10] Heroin abuse ([facility] )     3/18/2020 12:34 AM Avery Copier Add [Z59 0] Homeless       ED Disposition     ED Disposition Condition Date/Time Comment    Discharge Stable Wed Mar 18, 2020 12:34 AM Deb Sequeiraryan Rivera discharge to home/self care  Follow-up Information     Follow up With Specialties Details Why Contact Info    Humera Capps MD Family Medicine Schedule an appointment as soon as possible for a visit in 1 week  [address] [place] [address] Drive  [phone]            Discharge Medication List as of 3/18/2020 12:34 AM      CONTINUE these medications which have NOT CHANGED    Details   acetaminophen (TYLENOL) 325 mg tablet Take 2 tablets (650 mg total) by mouth every 6 (six) hours as needed for mild pain Take 2 tablets by mouth every 6 hours as needed for pain, Starting Tue 1/21/2020, Print      divalproex sodium (DEPAKOTE) 500 mg EC tablet Take by mouth every 8 (eight) hours, Historical Med      FLUoxetine HCl (PROZAC PO) Take by mouth, Historical Med      ibuprofen (MOTRIN) 400 mg tablet Take 1 tablet (400 mg total) by mouth every 8 (eight) hours as needed for mild pain, Starting Tue 1/21/2020, Print      nicotine (NICODERM CQ) 21 mg/24 hr TD 24 hr patch Place 1 patch on the skin every 24 hours, Historical Med      QUEtiapine (SEROquel) 200 mg tablet Take 200 mg by mouth daily at bedtime, Historical Med           No discharge procedures on file      PDMP Review     None          ED Provider  Electronically Signed by           Dionne Iniguez, DO  03/18/20 Juliana STEELE  6 , DO  03/18/20 5852

## 2020-03-18 NOTE — ED NOTES
Pt reports he is here for rehab from heroin today  Pt states he was here a week ago and left because he "couldn't wait any longer than a day"  Pt states he typically uses "one bundle, about 9 bags a day"  Pt denies any complaints at this time and is alert and oriented x 4  Pt appears to be in no respatory/physrical distress         Rosario Harrison RN  03/18/20 3206

## 2020-08-24 ENCOUNTER — HOSPITAL ENCOUNTER (EMERGENCY)
Facility: HOSPITAL | Age: 30
End: 2020-08-25
Attending: EMERGENCY MEDICINE | Admitting: EMERGENCY MEDICINE
Payer: COMMERCIAL

## 2020-08-24 DIAGNOSIS — R45.1 AGITATION: ICD-10-CM

## 2020-08-24 DIAGNOSIS — F29 PSYCHOSIS (HCC): Primary | ICD-10-CM

## 2020-08-24 LAB
AMPHETAMINES SERPL QL SCN: POSITIVE
BARBITURATES UR QL: NEGATIVE
BENZODIAZ UR QL: NEGATIVE
COCAINE UR QL: NEGATIVE
ETHANOL EXG-MCNC: 0 MG/DL
METHADONE UR QL: NEGATIVE
OPIATES UR QL SCN: NEGATIVE
OXYCODONE+OXYMORPHONE UR QL SCN: NEGATIVE
PCP UR QL: NEGATIVE
SARS-COV-2 RNA RESP QL NAA+PROBE: NEGATIVE
THC UR QL: NEGATIVE

## 2020-08-24 PROCEDURE — 80307 DRUG TEST PRSMV CHEM ANLYZR: CPT | Performed by: EMERGENCY MEDICINE

## 2020-08-24 PROCEDURE — 99285 EMERGENCY DEPT VISIT HI MDM: CPT | Performed by: EMERGENCY MEDICINE

## 2020-08-24 PROCEDURE — 99285 EMERGENCY DEPT VISIT HI MDM: CPT

## 2020-08-24 PROCEDURE — U0003 INFECTIOUS AGENT DETECTION BY NUCLEIC ACID (DNA OR RNA); SEVERE ACUTE RESPIRATORY SYNDROME CORONAVIRUS 2 (SARS-COV-2) (CORONAVIRUS DISEASE [COVID-19]), AMPLIFIED PROBE TECHNIQUE, MAKING USE OF HIGH THROUGHPUT TECHNOLOGIES AS DESCRIBED BY CMS-2020-01-R: HCPCS | Performed by: EMERGENCY MEDICINE

## 2020-08-24 PROCEDURE — 82075 ASSAY OF BREATH ETHANOL: CPT | Performed by: EMERGENCY MEDICINE

## 2020-08-24 NOTE — ED PROVIDER NOTES
History  Chief Complaint   Patient presents with    Psychiatric Evaluation     Pt lives at [facility] and was sent to ed for eval because the other residents feel he is a threat to them  Pt denies SI/HI  Pt believes he was sent to the ED to get meds for schizophrenia  Pt states people are making stuff up about him because they dont like him and he is trying to be cooperative and come to the ED because he wants people to know these things aren't true  61-year-old male presents via EMS for evaluation  Patient has history of schizophrenia, he was previously hospitalized at [facility], since then he has been living in a recovery house  Patient reports he feels no one at the [facility] likes him and that is why EMS was called  Patient notes he was taking a nap and he woke up to EMS  Patient declines altercations or fights at the house  Patient states when he was discharged from [facility] he was only discharged on hypertension medication and not psychiatric medication  Patient denies SI or HI  Prior to Admission Medications   Prescriptions Last Dose Informant Patient Reported? Taking?    FLUoxetine HCl (PROZAC PO)   Yes No   Sig: Take by mouth   QUEtiapine (SEROquel) 200 mg tablet   Yes No   Sig: Take 200 mg by mouth daily at bedtime   acetaminophen (TYLENOL) 325 mg tablet   No No   Sig: Take 2 tablets (650 mg total) by mouth every 6 (six) hours as needed for mild pain Take 2 tablets by mouth every 6 hours as needed for pain   divalproex sodium (DEPAKOTE) 500 mg EC tablet   Yes No   Sig: Take by mouth every 8 (eight) hours   ibuprofen (MOTRIN) 400 mg tablet   No No   Sig: Take 1 tablet (400 mg total) by mouth every 8 (eight) hours as needed for mild pain   nicotine (NICODERM CQ) 21 mg/24 hr TD 24 hr patch   Yes No   Sig: Place 1 patch on the skin every 24 hours      Facility-Administered Medications: None       Past Medical History:   Diagnosis Date    Anxiety     Bipolar disorder ([facility] )  Depression     Drug abuse ([facility] )     Intermittent explosive disorder     Psychiatric disorder     Schizo-affective schizophrenia ([facility] )     Seizures ([place] [address] )        Past Surgical History:   Procedure Laterality Date    LEG SURGERY         History reviewed  No pertinent family history  I have reviewed and agree with the history as documented  E-Cigarette/Vaping     E-Cigarette/Vaping Substances     Social History     Tobacco Use    Smoking status: Current Every Day Smoker     Packs/day: 0 25     Years: 12 00     Pack years: 3 00     Types: Cigarettes    Smokeless tobacco: Never Used   Substance Use Topics    Alcohol use: Not Currently    Drug use: Not Currently     Types: Prescription, Heroin, Marijuana     Comment: reports using 1-2 bags of heroin a day         Review of Systems   Respiratory: Negative for shortness of breath  Cardiovascular: Negative for chest pain  Gastrointestinal: Negative for abdominal pain  Psychiatric/Behavioral: Negative for agitation and suicidal ideas  All other systems reviewed and are negative  Physical Exam  ED Triage Vitals [08/24/20 1705]   Temperature Pulse Respirations Blood Pressure SpO2   98 4 °F (36 9 °C) 103 20 118/68 97 %      Temp Source Heart Rate Source Patient Position - Orthostatic VS BP Location FiO2 (%)   Oral Monitor Lying Right arm --      Pain Score       --             Orthostatic Vital Signs  Vitals:    08/24/20 1705 08/25/20 0256 08/25/20 0642   BP: 118/68 120/72 139/72   Pulse: 103 82 68   Patient Position - Orthostatic VS: Lying Lying Lying       Physical Exam  Vitals signs reviewed  Constitutional:       Appearance: Normal appearance  Comments: Rambling, tangential speech   HENT:      Head: Normocephalic and atraumatic  Eyes:      General: No scleral icterus  Right eye: No discharge  Left eye: No discharge  Cardiovascular:      Rate and Rhythm: Normal rate     Pulmonary:      Effort: Pulmonary effort is normal  No respiratory distress  Musculoskeletal:         General: No deformity or signs of injury  Right lower leg: No edema  Left lower leg: No edema  Skin:     Coloration: Skin is not pale  Neurological:      General: No focal deficit present  Mental Status: He is alert  Coordination: Coordination normal       Gait: Gait normal          ED Medications  Medications - No data to display    Diagnostic Studies  Results Reviewed     Procedure Component Value Units Date/Time    Novel Coronavirus [facility] [[id number]]  (Normal) Collected:  08/24/20 2149    Lab Status:  Final result Specimen:  Nares from Nose Updated:  08/24/20 2330     SARS-CoV-2 Negative    Narrative: The specimen collection materials, transport medium, and/or testing methodology utilized in the production of these test results have been proven to be reliable in a limited validation with an abbreviated program under the Emergency Utilization Authorization provided by the FDA  Testing reported as "Presumptive positive" will be confirmed with secondary testing with a reference laboratory to ensure result accuracy  Clinical caution and judgement should be used with the interpretation of these results with consideration of the clinical impression and other laboratory testing  Testing reported as "Positive" or "Negative" has been proven to be accurate according to standard laboratory validation requirements  All testing is performed with control materials showing appropriate reactivity at standard intervals        Rapid drug screen, urine [[id number]]  (Abnormal) Resulted:  08/24/20 2142    Lab Status:  Final result Specimen:  Urine Updated:  08/24/20 2142     Amph/Meth UR Positive     Barbiturate Ur Negative     Benzodiazepine Urine Negative     Cocaine Urine Negative     Methadone Urine Negative     Opiate Urine Negative     PCP Ur Negative     THC Urine Negative     Oxycodone Urine Negative    Narrative:       FOR MEDICAL PURPOSES ONLY  IF CONFIRMATION NEEDED PLEASE CONTACT THE LAB WITHIN 5 DAYS  Drug Screen Cutoff Levels:  AMPHETAMINE/METHAMPHETAMINES  1000 ng/mL  BARBITURATES     200 ng/mL  BENZODIAZEPINES     200 ng/mL  COCAINE      300 ng/mL  METHADONE      300 ng/mL  OPIATES      300 ng/mL  PHENCYCLIDINE     25 ng/mL  THC       50 ng/mL  OXYCODONE      100 ng/mL    POCT alcohol breath test [[id number]]  (Normal) Resulted:  08/24/20 2032    Lab Status:  Final result Updated:  08/24/20 2032     EXTBreath Alcohol 0 000                 No orders to display         Procedures  Procedures      ED Course  ED Course as of Aug 25 1616   Mon Aug 24, 2020   1924 Signed 201  MDM  Number of Diagnoses or Management Options  Agitation:   Psychosis [facility]):   Diagnosis management comments: 33yo male presents for evaluation, patient is reportedly dismissed from his recovery house due to erratic and disruptive behavior  He signed 12 and was transferred to behavioral health facility  Disposition  Final diagnoses:   Agitation   Psychosis ([facility] )     Time reflects when diagnosis was documented in both MDM as applicable and the Disposition within this note     Time User Action Codes Description Comment    8/25/2020  4:08 AM Hurley Aj Add [R45 1] Agitation     8/25/2020  4:09 AM Hurley Aj Add [F29] Psychosis ([facility] )     8/25/2020  4:09 AM Hurley Aj Modify [R45 1] Agitation     8/25/2020  4:09 AM Hurley Aj Modify [F29] Psychosis [facility])       ED Disposition     ED Disposition Condition Date/Time Comment    Transfer to [address] [place] [address] Aug 25, 2020  4:08 AM Reese Eckert JeyAngelkodi should be transferred out to [facility] and has been medically cleared          MD Documentation      Most Recent Value   Patient Condition  An emergency transfer is being made prior to stabilization due to the need for definitive care and the benefit of transfer outweighs the risk, The patient has been stabilized such that within reasonable medical probability, no material deterioration of the patient condition or the condition of the unborn child(marika) is likely to result from the transfer   Reason for Transfer  Level of Care needed not available at this facility [psychiatry]   Benefits of Transfer  Specialized equipment and/or services available at the receiving facility (Include comment)________________________ [inpatient psychiatry]   Risks of Transfer  Potential for delay in receiving treatment, Potential deterioration of medical condition, Increased discomfort during transfer, Possible worsening of condition or death during transfer   Accepting Physician  Dr Alicia Antunez Name, [facility]    (Name & Tel number)  Selene Cole [phone]   Transported by [employer] and Unit #)  Julian Marroquin   Provider Certification  General risk, such as traffic hazards, adverse weather conditions, rough terrain or turbulence, possible failure of equipment (including vehicle or aircraft), or consequences of actions of persons outside the control of the transport personnel, Risk of worsening condition, The possibility of a transport vehicle being unavailable, Unanticipated needs of medical equipment and personnel during transport, Consent was not obtained as patient is committed to psychiatric facility and transfer is mandated      RN Documentation      Most [address] [place] [address] Name, [facility]    (Name & Tel number)  Selene Cole [phone]   Transported by [employer] and Unit #)  LESLYE      Follow-up Information    None         Discharge Medication List as of 8/25/2020  8:30 AM      CONTINUE these medications which have NOT CHANGED    Details   acetaminophen (TYLENOL) 325 mg tablet Take 2 tablets (650 mg total) by mouth every 6 (six) hours as needed for mild pain Take 2 tablets by mouth every 6 hours as needed for pain, Starting Tue 1/21/2020, Print      divalproex sodium (DEPAKOTE) 500 mg EC tablet Take by mouth every 8 (eight) hours, Historical Med      FLUoxetine HCl (PROZAC PO) Take by mouth, Historical Med      ibuprofen (MOTRIN) 400 mg tablet Take 1 tablet (400 mg total) by mouth every 8 (eight) hours as needed for mild pain, Starting Tue 1/21/2020, Print      nicotine (NICODERM CQ) 21 mg/24 hr TD 24 hr patch Place 1 patch on the skin every 24 hours, Historical Med      QUEtiapine (SEROquel) 200 mg tablet Take 200 mg by mouth daily at bedtime, Historical Med           No discharge procedures on file  PDMP Review     None           ED Provider  Attending physically available and evaluated Aurelio Milligan  EFRAIN managed the patient along with the ED Attending      Electronically Signed by         Bill Bolivar DO  08/25/20 9288

## 2020-08-24 NOTE — ED ATTENDING ATTESTATION
8/24/2020  IRichard MD, saw and evaluated the patient  I have discussed the patient with the resident/non-physician practitioner and agree with the resident's/non-physician practitioner's findings, Plan of Care, and MDM as documented in the resident's/non-physician practitioner's note, except where noted  All available labs and Radiology studies were reviewed  I was present for key portions of any procedure(s) performed by the resident/non-physician practitioner and I was immediately available to provide assistance  At this point I agree with the current assessment done in the Emergency Department  I have conducted an independent evaluation of this patient a history and physical is as follows:    ED Course         Critical Care Time  Procedures     34 yo male sent in from group home due to feeling other residents are a threat to him  Pt feels his shizophrenia is worse  No si, no hi  Pt with paranoia, no hallucinations  Pt with no alcohol or drug use  Vss, afebrile, lungs cta, rrr, abdomen soft nontender, no neuro deficits  Crisis

## 2020-08-25 VITALS
HEART RATE: 68 BPM | DIASTOLIC BLOOD PRESSURE: 72 MMHG | SYSTOLIC BLOOD PRESSURE: 139 MMHG | TEMPERATURE: 98.4 F | WEIGHT: 130 LBS | OXYGEN SATURATION: 98 % | HEIGHT: 66 IN | RESPIRATION RATE: 18 BRPM | BODY MASS INDEX: 20.89 KG/M2

## 2020-08-25 NOTE — EMTALA/ACUTE CARE TRANSFER
[facility] [address]  Dept: [phone]      MMYHVZ TRANSFER CONSENT    NAME Greyson Rivera                                         1990                              MRN [id number]    I have been informed of my rights regarding examination, treatment, and transfer   by Dr Masha Mcguire MD    Benefits: Specialized equipment and/or services available at the receiving facility (Include comment)________________________(inpatient psychiatry)    Risks: Potential for delay in receiving treatment, Potential deterioration of medical condition, Increased discomfort during transfer, Possible worsening of condition or death during transfer      Transfer Request   I acknowledge that my medical condition has been evaluated and explained to me by the emergency department physician or other qualified medical person and/or my attending physician who has recommended and offered to me further medical examination and treatment  I understand the Hospital's obligation with respect to the treatment and stabilization of my emergency medical condition  I nevertheless request to be transferred  I release the Hospital, the doctor, and any other persons caring for me from all responsibility or liability for any injury or ill effects that may result from my transfer and agree to accept all responsibility for the consequences of my choice to transfer, rather than receive stabilizing treatment at the Hospital  I understand that because the transfer is my request, my insurance may not provide reimbursement for the services  The Hospital will assist and direct me and my family in how to make arrangements for transfer, but the hospital is not liable for any fees charged by the transport service    In spite of this understanding, I refuse to consent to further medical examination and treatment which has been offered to me, and request transfer to Accepting Facility Name, [address] [facility]  I authorize the performance of emergency medical procedures and treatments upon me in both transit and upon arrival at the receiving facility  Additionally, I authorize the release of any and all medical records to the receiving facility and request they be transported with me, if possible  I authorize the performance of emergency medical procedures and treatments upon me in both transit and upon arrival at the receiving facility  Additionally, I authorize the release of any and all medical records to the receiving facility and request they be transported with me, if possible  I understand that the safest mode of transportation during a medical emergency is an ambulance and that the Hospital advocates the use of this mode of transport  Risks of traveling to the receiving facility by car, including absence of medical control, life sustaining equipment, such as oxygen, and medical personnel has been explained to me and I fully understand them  (BRIAN CORRECT BOX BELOW)  [  ]  I consent to the stated transfer and to be transported by ambulance/helicopter  [  ]  I consent to the stated transfer, but refuse transportation by ambulance and accept full responsibility for my transportation by car  I understand the risks of non-ambulance transfers and I exonerate the Hospital and its staff from any deterioration in my condition that results from this refusal     X___________________________________________    DATE  20  TIME________  Signature of patient or legally responsible individual signing on patient behalf           RELATIONSHIP TO PATIENT_________________________          Provider Certification    NAME Norma Joselyn                                         1990                              MRN [id number]    A medical screening exam was performed on the above named patient    Based on the examination:    Condition Necessitating Transfer The primary encounter diagnosis was Psychosis ([facility] )  A diagnosis of Agitation was also pertinent to this visit  Patient Condition: An emergency transfer is being made prior to stabilization due to the need for definitive care and the benefit of transfer outweighs the risk, The patient has been stabilized such that within reasonable medical probability, no material deterioration of the patient condition or the condition of the unborn child(marika) is likely to result from the transfer    Reason for Transfer: Level of Care needed not available at this facility(psychiatry)    Transfer Requirements: [address]   · Space available and qualified personnel available for treatment as acknowledged by Cheryl Robert [phone]  · Agreed to accept transfer and to provide appropriate medical treatment as acknowledged by       Dr Ashley Ty  · Appropriate medical records of the examination and treatment of the patient are provided at the time of transfer   [address] [place] [address], [address] _______  · Transfer will be performed by qualified personnel from [facility]  and appropriate transfer equipment as required, including the use of necessary and appropriate life support measures      Provider Certification: I have examined the patient and explained the following risks and benefits of being transferred/refusing transfer to the patient/family:  General risk, such as traffic hazards, adverse weather conditions, rough terrain or turbulence, possible failure of equipment (including vehicle or aircraft), or consequences of actions of persons outside the control of the transport personnel, Risk of worsening condition, The possibility of a transport vehicle being unavailable, Unanticipated needs of medical equipment and personnel during transport, Consent was not obtained as patient is committed to psychiatric facility and transfer is mandated      Based on these reasonable risks and benefits to the patient and/or the unborn child(marika), and based upon the information available at the time of the patients examination, I certify that the medical benefits reasonably to be expected from the provision of appropriate medical treatments at another medical facility outweigh the increasing risks, if any, to the individuals medical condition, and in the case of labor to the unborn child, from effecting the transfer      X____________________________________________ DATE 08/25/20        TIME_______      ORIGINAL - SEND TO MEDICAL RECORDS   COPY - SEND WITH PATIENT DURING TRANSFER

## 2020-08-25 NOTE — ED NOTES
Pt is a 35 y o  male who was brought to the ED from his recovery house due to making bizarre statements and intimidating his roommates  Patient denies knowledge to why he was brought to the ED, but states that he told the police he was voluntary  Patient denies issues with his 5 roommates, and states that he was just sleeping today and staying to himself when the police arrived  Patient is speaking softly and calmly, relating that he just wants to go to outpatient but he missed his appointment today at [facility] because of this ordeal  Patient admits he hasn't been on his medications, but cannot relate how long its been  Pierre Mensah states that patient has been in this recovery house since April, and more recently there have been reports of issues with patient  Pierre Mensah cannot provide a good history, but states that patient's landlord and sister could be contacted  Patient preseverates on leaving the ED with a paper for outpatient services and the contact information for ED crisis for when he needs to talk  Patient keeps repeating that he's trying to do the right thing, but when offered inpatient treatment he seems reluctant  Patient denies suicidal/homicidal ideations and auditory/visual hallucinations  Patient started to express paranoid thoughts that people are talking behind his back, he's rejected by everyone, and that his roommates wouldn't have had a meeting without him  Patient then started to believe that this writer was manipulating the situation and lying about offering inpatient treatment  Patient then began to express that he needs to have an x-ray after every meal so he "can prove what's really happening here"  He was asked about scars on his arm, stating it says "forest, which is a reference to the Bible"  Patient states he cut that into his arm a long time ago, but can not recall when  Patient could not provide additional treatment history      Per patient's chart, he was hospitalized for psychiatric treatment at [facility] in 01/2019 following the death of his mother  Patient has a history of drug use, which his mother also engaged in with patient when she was alive  Patient has a history of Heroin, meth and K2 use  Patient denies any recent use, confidently relating that his urine would come back clean  Patient is on probation in [facility], but cannot provide information into his prior charges  Patient has been inpatient multiple times for rehab/detox, most of which was court ordered  Patient has no know suicide attempts and no known history of violence  During his time in the ED, patient has remained appropriate although has moments of appearing verbally irritable  Patient continues to express that nobody is going to help him, but was encouraged that going inpatient would provide him the most support and is the help he needs right now  Chief Complaint   Patient presents with    Psychiatric Evaluation     Pt lives at [facility] and was sent to ed for eval because the other residents feel he is a threat to them  Pt denies SI/HI  Pt believes he was sent to the ED to get meds for schizophrenia  Pt states people are making stuff up about him because they dont like him and he is trying to be cooperative and come to the ED because he wants people to know these things aren't true        Intake Assessment completed, Safety risk Assessment completed    HALIMA Schmid  08/24/20 2121

## 2020-08-25 NOTE — ED NOTES
Patient's sister Tiffanie Sales was contacted and expressed that patient cannot live with them and she feels he needs inpatient treatment  She expressed concern for patient's overall mental health  She would like to be contacted at anytime, if patient allows, once placement is secured       [facility]: [phone]  Manasa Mcdonald: [address], \[facility]\""  08/24/20   2836

## 2020-08-25 NOTE — ED NOTES
Call back from [place] at [facility], stating they are unable to accept patient as they would prefer patient on one of their more acute units       HALIMA Altman  08/24/20   6229

## 2020-08-25 NOTE — ED NOTES
Chart faxed to [facility], for review for discharge bed       Oleta Severin, LSW  08/24/20   3250

## 2020-08-25 NOTE — ED NOTES
Attempted to call report to [facility] twice with no answer     Ubaldo Baum RN  08/25/20 6541

## 2020-08-25 NOTE — ED NOTES
Met with patient and his brother-in-law, Meri Hernández, briefly regarding today's events  Patient denied knowledge of what occurred, stating he was sleeping in his room when police arrived  Meri Hernández expressed that patient's landlord and  had been calling him all day relating ongoing issues and bizarre behaviors patient had been exhibiting  Meri Hernández states that he was contacted by patient's  who reported that patient was down at the [facility] talking about being abducted by aliens and blaming a white board for stealing his food stamp card  Patient laughs at this, and denies that happened and also notes that it doesn't even make sense  Patient states that he missed his appointment at [facility] today to get back on his medications  Meri Hernández reports that he had picked up medication for patient not too long ago, however, patient states he ran out and never told Meri Hernández so that he could get a refill  Meri Hernández expressed concern for patient's mental health and feels that he needs inpatient treatment  Patient does state that he's "trying to do the right thing", but when offered inpatient treatment, he remains preoccupied on getting paperwork for outpatient providers and going back to his recovery house, where he has lived since April  Meri Hernández provided contact information for patient's landlord Myra Eldridge @ [phone]) and patient was informed she would be called in hopes to gather additional information regarding today's events  Call placed to Marisol Abbott who states that last night patient was voted out of the [employer], by his 5 other roommates, and therefore he only had 12 hours to move out  Marisol Abbott relates telling Meri Hernández about patient needing to move out and states that's why he was there today  Celi expressed that patient's roommates were concerned with his "bizarre and mental behaviors", expressing that patient had been acting odd for the past two weeks   She reports that patient was saying he had glass in his throat and would make hacking noises and would spit which people didn't like  Deb was asked about patient having paid for the month, but she states that he is on a week to week basis and they would gladly refund the money for this week because patient could not return  When patient was told about this, he perseverated on the  and his brother-in-law telling him he could not be kicked out  Despite telling patient multiple times that he could not return there, he continued to insist that he would be able to and then he would call his  and maybe go to a shelter tonight (despite it being too late in the night to do so)       Juliet Harley, HALIMA  08/24/20   [id number]

## 2020-08-25 NOTE — ED NOTES
Dual bed search to following facilities:     [facility]: No beds available  [facility]: Spoke with Francine Hernandez, beds available; chart faxed for review  [facility]: One male bed available; chart faxed for review  [facility]: Spoke with Lacey Corona, no current beds available- able to review for discharge bed; call back to send if needed  [facility]: Spoke with Mendel Old, no beds available       HALIMA Chahal  08/24/20   [phone]

## 2020-08-25 NOTE — ED NOTES
Patient is accepted at [facility]     Patient is accepted by Dr Yamileth Barron per [address] is arranged with [facility]     Transportation is scheduled for-THEY WILL CALL IN AM WITH TIME FOR CTS     Patient may go to the floor after 0700        Nurse report is to be called to [phone] prior to patient transfer

## 2020-08-25 NOTE — ED NOTES
Insurance Authorization for admission:   Phone call placed to [facility]  Phone number: [phone]  Spoke to [facility]O      3 days approved  Level of care: Acute Inpt  (201)  Review on TBD  Authorization # To be obtained by accepting facility upon arrival         EVS (Eligibility Verification System) called - [phone]  Automated system indicates: Active with [address] [place] [address] Road for Transportation: To be obtained, if necessary, once transportation scheduled       HALIMA Krishnan  08/24/20   [phone]

## 2020-09-21 ENCOUNTER — TELEPHONE (OUTPATIENT)
Dept: FAMILY MEDICINE CLINIC | Facility: CLINIC | Age: 30
End: 2020-09-21

## 2020-09-21 NOTE — TELEPHONE ENCOUNTER
I reached out to find out if pt would make OV since he has never been seen by the provider but she is marked as PCP  A gentleman answered, unsure is this was the patient but he became agitated at my question and told me to not call back  Please remove Dr Nicole Soto from PCP field as we have NEVER seen pt

## 2020-09-22 NOTE — TELEPHONE ENCOUNTER
09/22/20 8:16 AM     Thank you for your request  Your request has been received, reviewed, and the patient chart updated  The PCP has successfully been removed with a patient attribution note  This message will now be completed      Thank you  Cesar Dorantes

## 2021-09-16 ENCOUNTER — HOSPITAL ENCOUNTER (EMERGENCY)
Facility: HOSPITAL | Age: 31
Discharge: HOME | End: 2021-09-16
Attending: EMERGENCY MEDICINE | Admitting: EMERGENCY MEDICINE
Payer: COMMERCIAL

## 2021-09-16 ENCOUNTER — APPOINTMENT (EMERGENCY)
Dept: CT IMAGING | Facility: HOSPITAL | Age: 31
End: 2021-09-16

## 2021-09-16 VITALS
DIASTOLIC BLOOD PRESSURE: 78 MMHG | OXYGEN SATURATION: 97 % | BODY MASS INDEX: 20.99 KG/M2 | WEIGHT: 130.07 LBS | RESPIRATION RATE: 18 BRPM | SYSTOLIC BLOOD PRESSURE: 135 MMHG | HEART RATE: 105 BPM

## 2021-09-16 DIAGNOSIS — S60.511A ABRASION OF RIGHT HAND, INITIAL ENCOUNTER: ICD-10-CM

## 2021-09-16 DIAGNOSIS — T40.1X1A HEROIN OVERDOSE (HCC): Primary | ICD-10-CM

## 2021-09-16 DIAGNOSIS — S60.512A ABRASION OF LEFT HAND, INITIAL ENCOUNTER: ICD-10-CM

## 2021-09-16 DIAGNOSIS — S09.8XXA BLUNT HEAD TRAUMA, INITIAL ENCOUNTER: ICD-10-CM

## 2021-09-16 DIAGNOSIS — S00.81XA ABRASION OF FACE, INITIAL ENCOUNTER: ICD-10-CM

## 2021-09-16 PROCEDURE — 72125 CT NECK SPINE W/O DYE: CPT

## 2021-09-16 PROCEDURE — 99284 EMERGENCY DEPT VISIT MOD MDM: CPT

## 2021-09-16 PROCEDURE — 70450 CT HEAD/BRAIN W/O DYE: CPT

## 2021-09-16 PROCEDURE — 90471 IMMUNIZATION ADMIN: CPT

## 2021-09-16 PROCEDURE — 90715 TDAP VACCINE 7 YRS/> IM: CPT | Performed by: EMERGENCY MEDICINE

## 2021-09-16 PROCEDURE — 99285 EMERGENCY DEPT VISIT HI MDM: CPT | Performed by: EMERGENCY MEDICINE

## 2021-09-16 RX ORDER — GINSENG 100 MG
1 CAPSULE ORAL ONCE
Status: COMPLETED | OUTPATIENT
Start: 2021-09-16 | End: 2021-09-16

## 2021-09-16 RX ORDER — NALOXONE HYDROCHLORIDE 1 MG/ML
INJECTION INTRAMUSCULAR; INTRAVENOUS; SUBCUTANEOUS
Status: COMPLETED
Start: 2021-09-16 | End: 2021-09-16

## 2021-09-16 RX ADMIN — BACITRACIN ZINC 1 LARGE APPLICATION: 500 OINTMENT TOPICAL at 16:48

## 2021-09-16 RX ADMIN — NALOXONE HYDROCHLORIDE 4 MG: 4 SPRAY NASAL at 17:50

## 2021-09-16 RX ADMIN — TETANUS TOXOID, REDUCED DIPHTHERIA TOXOID AND ACELLULAR PERTUSSIS VACCINE, ADSORBED 0.5 ML: 5; 2.5; 8; 8; 2.5 SUSPENSION INTRAMUSCULAR at 16:25

## 2021-09-16 NOTE — ED PROVIDER NOTES
History  Chief Complaint   Patient presents with    Overdose - Accidental     heroin overdose      Patient is a 71-year-old male  He has a history of homelessness  He has psychiatric history  He has a history of drug abuse  He drinks alcohol  He has use heroin  He has used methamphetamine  Today patient used heroin and was found unresponsive  EMS was called  He received Narcan intranasal and IV  He woke up  He presents to the emergency room requesting referral for detox  He is not suicidal   The overdose was accidental   Denies alcohol today or other drugs  He was found to have abrasions to his hands and to his right forehead  Unclear if there was any trauma  Unknown last tetanus vaccination  He denies any other injuries  Prior to Admission Medications   Prescriptions Last Dose Informant Patient Reported? Taking? FLUoxetine HCl (PROZAC PO)   Yes No   Sig: Take by mouth   QUEtiapine (SEROquel) 200 mg tablet   Yes No   Sig: Take 200 mg by mouth daily at bedtime   acetaminophen (TYLENOL) 325 mg tablet   No No   Sig: Take 2 tablets (650 mg total) by mouth every 6 (six) hours as needed for mild pain Take 2 tablets by mouth every 6 hours as needed for pain   divalproex sodium (DEPAKOTE) 500 mg EC tablet   Yes No   Sig: Take by mouth every 8 (eight) hours   ibuprofen (MOTRIN) 400 mg tablet   No No   Sig: Take 1 tablet (400 mg total) by mouth every 8 (eight) hours as needed for mild pain   nicotine (NICODERM CQ) 21 mg/24 hr TD 24 hr patch   Yes No   Sig: Place 1 patch on the skin every 24 hours      Facility-Administered Medications: None       Past Medical History:   Diagnosis Date    Anxiety     Bipolar disorder ([facility] )     Depression     Drug abuse ([facility] )     Intermittent explosive disorder     Psychiatric disorder     Schizo-affective schizophrenia ([facility] )     Seizures ([facility] )        Past Surgical History:   Procedure Laterality Date    LEG SURGERY         History reviewed   No pertinent family history  I have reviewed and agree with the history as documented  E-Cigarette/Vaping    E-Cigarette Use Never User      E-Cigarette/Vaping Substances    Nicotine No     THC No     CBD No     Flavoring No     Other No     Unknown No      Social History     Tobacco Use    Smoking status: Current Every Day Smoker     Packs/day: 0 25     Years: 12 00     Pack years: 3 00     Types: Cigarettes    Smokeless tobacco: Never Used   Vaping Use    Vaping Use: Never used   Substance Use Topics    Alcohol use: Not Currently    Drug use: Yes     Types: Prescription, Heroin, Marijuana, Cocaine     Comment: reports using 1-2 bags of heroin a day        Review of Systems   Constitutional: Negative for chills and fever  HENT: Negative for rhinorrhea and sore throat  Eyes: Negative for pain, redness and visual disturbance  Respiratory: Negative for cough and shortness of breath  Cardiovascular: Negative for chest pain and leg swelling  Gastrointestinal: Negative for abdominal pain, diarrhea and vomiting  Endocrine: Negative for polydipsia and polyuria  Genitourinary: Negative for dysuria, frequency and hematuria  Musculoskeletal: Negative for back pain and neck pain  Skin: Positive for wound  Negative for rash  Allergic/Immunologic: Negative for immunocompromised state  Neurological: Negative for weakness, numbness and headaches  Psychiatric/Behavioral: Negative for hallucinations and suicidal ideas  All other systems reviewed and are negative  Physical Exam  Physical Exam  Vitals reviewed  Constitutional:       General: He is not in acute distress  Appearance: He is not toxic-appearing  Comments: Poorly groomed  Very thin  HENT:      Head: Normocephalic  Comments: There is an abrasion to the right forehead  Face and head is otherwise atraumatic       Nose: Nose normal       Mouth/Throat:      Mouth: Mucous membranes are moist    Eyes:      General: Right eye: No discharge  Left eye: No discharge  Extraocular Movements: Extraocular movements intact  Conjunctiva/sclera: Conjunctivae normal       Pupils: Pupils are equal, round, and reactive to light  Cardiovascular:      Rate and Rhythm: Normal rate and regular rhythm  Pulses: Normal pulses  Heart sounds: Normal heart sounds  No murmur heard  No friction rub  No gallop  Pulmonary:      Effort: Pulmonary effort is normal  No respiratory distress  Breath sounds: Normal breath sounds  No stridor  No wheezing, rhonchi or rales  Chest:      Chest wall: No tenderness  Abdominal:      General: Bowel sounds are normal  There is no distension  Palpations: Abdomen is soft  Tenderness: There is no abdominal tenderness  There is no right CVA tenderness, left CVA tenderness, guarding or rebound  Musculoskeletal:         General: Signs of injury present  No swelling, tenderness or deformity  Normal range of motion  Cervical back: Normal range of motion and neck supple  No rigidity or tenderness  Right lower leg: No edema  Left lower leg: No edema  Comments: No midline thoracic or lumbar tenderness  There are abrasions to the dorsal aspect both hands  No bony tenderness  Extremities are otherwise atraumatic  Skin:     General: Skin is warm and dry  Coloration: Skin is not jaundiced or pale  Findings: No bruising, erythema or rash  Neurological:      General: No focal deficit present  Mental Status: He is alert and oriented to person, place, and time  GCS: GCS eye subscore is 4  GCS verbal subscore is 5  GCS motor subscore is 6  Cranial Nerves: No facial asymmetry  Sensory: No sensory deficit  Motor: Motor function is intact     Psychiatric:         Mood and Affect: Mood normal          Behavior: Behavior normal          Vital Signs  ED Triage Vitals [09/16/21 1616]   Temp Pulse Respirations Blood Pressure SpO2 -- 105 18 135/78 97 %      Temp src Heart Rate Source Patient Position - Orthostatic VS BP Location FiO2 (%)   -- Monitor Sitting Left arm --      Pain Score       --           Vitals:    09/16/21 1616   BP: 135/78   Pulse: 105   Patient Position - Orthostatic VS: Sitting         Visual Acuity      ED Medications  Medications   naloxone nasal- Given to patient by provider at discharge  (NARCAN) 4 mg/0 1 mL nasal spray 4 mg (has no administration in time range)   tetanus-diphtheria-acellular pertussis (BOOSTRIX) IM injection 0 5 mL (0 5 mL Intramuscular Given 9/16/21 1625)   bacitracin topical ointment 1 large application (1 large application Topical Given 9/16/21 1648)   naloxone (NARCAN) 2 MG/2ML injection **ADS Override Pull** (  Given to EMS 9/16/21 1619)       Diagnostic Studies  Results Reviewed     None                 CT head without contrast   Final Result by Modesta Poe MD (09/16 1701)      No acute intracranial abnormality  Workstation performed: [id number]         CT cervical spine without contrast   Final Result by Modesta Poe MD (09/16 1708)      No cervical spine fracture or traumatic malalignment  Workstation performed: [id number]                    Procedures  Procedures         ED Course                             SBIRT 20yo+      Most Recent Value   SBIRT (22 yo +)   In order to provide better care to our patients, we are screening all of our patients for alcohol and drug use  Would it be okay to ask you these screening questions? Yes Filed at: 09/16/2021 1620   Initial Alcohol Screen: US AUDIT-C    1  How often do you have a drink containing alcohol? 3 Filed at: 09/16/2021 1620   2  How many drinks containing alcohol do you have on a typical day you are drinking? 2 Filed at: 09/16/2021 1620   3b  FEMALE Any Age, or MALE 65+: How often do you have 4 or more drinks on one occassion?   0 Filed at: 09/16/2021 1620   Audit-C Score  5 Filed at: 09/16/2021 1620 MDM  Number of Diagnoses or Management Options  Diagnosis management comments: Head CT negative for intracranial hemorrhage or skull fracture  CT of C-spine negative for fracture subluxation  Will refer to [facility] for drug and alcohol treatment  Appropriate for discharge and outpatient management  Amount and/or Complexity of Data Reviewed  Tests in the radiology section of CPT®: ordered and reviewed        Disposition  Final diagnoses:   Heroin overdose ([facility] )   Blunt head trauma, initial encounter   Abrasion of face, initial encounter   Abrasion of right hand, initial encounter   Abrasion of left hand, initial encounter     Time reflects when diagnosis was documented in both MDM as applicable and the Disposition within this note     Time User Action Codes Description Comment    9/16/2021  5:38 PM Shayy Juan Add [T40 1X1A] Heroin overdose ([facility] )     9/16/2021  5:38 PM Shayy Juan Add [S09  8XXA] Blunt head trauma, initial encounter     9/16/2021  5:38 PM Shayy Juan Add [S00 81XA] Abrasion of face, initial encounter     9/16/2021  5:38 PM Shayy Juan Add [F13 814U] Abrasion of right hand, initial encounter     9/16/2021  5:38 PM Shayy Juan Add [T68 369Q] Abrasion of left hand, initial encounter       ED Disposition     ED Disposition Condition Date/Time Comment    Discharge Stable Thu Sep 16, 2021  5:38 PM Robert Rivera discharge to home/self care  Follow-up Information     Follow up With Specialties Details Why Contact Info    Infol[employer]   Follow-up with family doctor in 1 week for re-evaluation, call for for [phone]            Patient's Medications   Discharge Prescriptions    No medications on file     No discharge procedures on file      PDMP Review     None          ED Provider  Electronically Signed by           Celestina Cuevas MD  09/16/21 [address] Mik Bautista MD  09/16/21 [id number]

## 2021-10-13 ENCOUNTER — HOSPITAL ENCOUNTER (EMERGENCY)
Facility: HOSPITAL | Age: 31
Discharge: HOME | End: 2021-10-13
Attending: EMERGENCY MEDICINE | Admitting: EMERGENCY MEDICINE
Payer: COMMERCIAL

## 2021-10-13 VITALS
HEART RATE: 84 BPM | TEMPERATURE: 98.9 F | WEIGHT: 127.43 LBS | SYSTOLIC BLOOD PRESSURE: 105 MMHG | DIASTOLIC BLOOD PRESSURE: 57 MMHG | RESPIRATION RATE: 20 BRPM | BODY MASS INDEX: 20.57 KG/M2 | OXYGEN SATURATION: 97 %

## 2021-10-13 DIAGNOSIS — R05.9 COUGH: Primary | ICD-10-CM

## 2021-10-13 PROCEDURE — U0005 INFEC AGEN DETEC AMPLI PROBE: HCPCS | Performed by: EMERGENCY MEDICINE

## 2021-10-13 PROCEDURE — 99284 EMERGENCY DEPT VISIT MOD MDM: CPT | Performed by: EMERGENCY MEDICINE

## 2021-10-13 PROCEDURE — U0003 INFECTIOUS AGENT DETECTION BY NUCLEIC ACID (DNA OR RNA); SEVERE ACUTE RESPIRATORY SYNDROME CORONAVIRUS 2 (SARS-COV-2) (CORONAVIRUS DISEASE [COVID-19]), AMPLIFIED PROBE TECHNIQUE, MAKING USE OF HIGH THROUGHPUT TECHNOLOGIES AS DESCRIBED BY CMS-2020-01-R: HCPCS | Performed by: EMERGENCY MEDICINE

## 2021-10-13 PROCEDURE — 99283 EMERGENCY DEPT VISIT LOW MDM: CPT

## 2021-10-14 LAB — SARS-COV-2 RNA RESP QL NAA+PROBE: NEGATIVE
